# Patient Record
Sex: MALE | Race: WHITE | NOT HISPANIC OR LATINO | Employment: OTHER | URBAN - METROPOLITAN AREA
[De-identification: names, ages, dates, MRNs, and addresses within clinical notes are randomized per-mention and may not be internally consistent; named-entity substitution may affect disease eponyms.]

---

## 2017-08-17 ENCOUNTER — LAB REQUISITION (OUTPATIENT)
Dept: LAB | Facility: HOSPITAL | Age: 57
End: 2017-08-17

## 2017-08-17 DIAGNOSIS — R13.10 DYSPHAGIA: ICD-10-CM

## 2017-08-17 PROCEDURE — 88305 TISSUE EXAM BY PATHOLOGIST: CPT | Performed by: INTERNAL MEDICINE

## 2017-08-18 LAB
CYTO UR: NORMAL
LAB AP CASE REPORT: NORMAL
LAB AP CLINICAL INFORMATION: NORMAL
LAB AP DIAGNOSIS COMMENT: NORMAL
Lab: NORMAL
PATH REPORT.FINAL DX SPEC: NORMAL
PATH REPORT.GROSS SPEC: NORMAL

## 2018-02-17 ENCOUNTER — HOSPITAL ENCOUNTER (EMERGENCY)
Facility: HOSPITAL | Age: 58
Discharge: HOME | End: 2018-02-18
Attending: EMERGENCY MEDICINE | Admitting: EMERGENCY MEDICINE

## 2018-02-17 ENCOUNTER — APPOINTMENT (OUTPATIENT)
Dept: GENERAL RADIOLOGY | Facility: HOSPITAL | Age: 58
End: 2018-02-17

## 2018-02-17 VITALS
OXYGEN SATURATION: 96 % | HEART RATE: 86 BPM | SYSTOLIC BLOOD PRESSURE: 132 MMHG | BODY MASS INDEX: 29.16 KG/M2 | DIASTOLIC BLOOD PRESSURE: 98 MMHG | WEIGHT: 220 LBS | TEMPERATURE: 98 F | HEIGHT: 73 IN | RESPIRATION RATE: 20 BRPM

## 2018-02-17 DIAGNOSIS — F41.9 ANXIETY: ICD-10-CM

## 2018-02-17 DIAGNOSIS — R06.02 SHORTNESS OF BREATH: Primary | ICD-10-CM

## 2018-02-17 DIAGNOSIS — J18.9 PNEUMONIA OF LEFT UPPER LOBE DUE TO INFECTIOUS ORGANISM: ICD-10-CM

## 2018-02-17 LAB
ALBUMIN SERPL-MCNC: 4.7 G/DL (ref 3.2–4.8)
ALBUMIN/GLOB SERPL: 1.5 G/DL (ref 1.5–2.5)
ALP SERPL-CCNC: 84 U/L (ref 25–100)
ALT SERPL W P-5'-P-CCNC: 23 U/L (ref 7–40)
ANION GAP SERPL CALCULATED.3IONS-SCNC: 9 MMOL/L (ref 3–11)
AST SERPL-CCNC: 32 U/L (ref 0–33)
BASOPHILS # BLD AUTO: 0.01 10*3/MM3 (ref 0–0.2)
BASOPHILS NFR BLD AUTO: 0.2 % (ref 0–1)
BILIRUB SERPL-MCNC: 0.4 MG/DL (ref 0.3–1.2)
BILIRUB UR QL STRIP: ABNORMAL
BUN BLD-MCNC: 14 MG/DL (ref 9–23)
BUN/CREAT SERPL: 11.7 (ref 7–25)
CALCIUM SPEC-SCNC: 9.5 MG/DL (ref 8.7–10.4)
CHLORIDE SERPL-SCNC: 102 MMOL/L (ref 99–109)
CLARITY UR: ABNORMAL
CO2 SERPL-SCNC: 24 MMOL/L (ref 20–31)
COLOR UR: ABNORMAL
CREAT BLD-MCNC: 1.2 MG/DL (ref 0.6–1.3)
D DIMER PPP FEU-MCNC: 0.31 MG/L (FEU) (ref 0–0.5)
DEPRECATED RDW RBC AUTO: 42.5 FL (ref 37–54)
EOSINOPHIL # BLD AUTO: 0.01 10*3/MM3 (ref 0–0.3)
EOSINOPHIL NFR BLD AUTO: 0.2 % (ref 0–3)
ERYTHROCYTE [DISTWIDTH] IN BLOOD BY AUTOMATED COUNT: 13.1 % (ref 11.3–14.5)
FLUAV AG NPH QL: NEGATIVE
FLUBV AG NPH QL IA: NEGATIVE
GFR SERPL CREATININE-BSD FRML MDRD: 63 ML/MIN/1.73
GLOBULIN UR ELPH-MCNC: 3.1 GM/DL
GLUCOSE BLD-MCNC: 103 MG/DL (ref 70–100)
GLUCOSE UR STRIP-MCNC: NEGATIVE MG/DL
HCT VFR BLD AUTO: 46.6 % (ref 38.9–50.9)
HGB BLD-MCNC: 16.2 G/DL (ref 13.1–17.5)
HGB UR QL STRIP.AUTO: NEGATIVE
HOLD SPECIMEN: NORMAL
HOLD SPECIMEN: NORMAL
IMM GRANULOCYTES # BLD: 0.01 10*3/MM3 (ref 0–0.03)
IMM GRANULOCYTES NFR BLD: 0.2 % (ref 0–0.6)
KETONES UR QL STRIP: ABNORMAL
LEUKOCYTE ESTERASE UR QL STRIP.AUTO: ABNORMAL
LYMPHOCYTES # BLD AUTO: 1.97 10*3/MM3 (ref 0.6–4.8)
LYMPHOCYTES NFR BLD AUTO: 44.6 % (ref 24–44)
MAGNESIUM SERPL-MCNC: 2 MG/DL (ref 1.3–2.7)
MCH RBC QN AUTO: 30.9 PG (ref 27–31)
MCHC RBC AUTO-ENTMCNC: 34.8 G/DL (ref 32–36)
MCV RBC AUTO: 88.8 FL (ref 80–99)
MONOCYTES # BLD AUTO: 0.49 10*3/MM3 (ref 0–1)
MONOCYTES NFR BLD AUTO: 11.1 % (ref 0–12)
NEUTROPHILS # BLD AUTO: 1.93 10*3/MM3 (ref 1.5–8.3)
NEUTROPHILS NFR BLD AUTO: 43.7 % (ref 41–71)
NITRITE UR QL STRIP: NEGATIVE
PH UR STRIP.AUTO: 5.5 [PH] (ref 5–8)
PLATELET # BLD AUTO: 180 10*3/MM3 (ref 150–450)
PMV BLD AUTO: 10.8 FL (ref 6–12)
POTASSIUM BLD-SCNC: 3.9 MMOL/L (ref 3.5–5.5)
PROT SERPL-MCNC: 7.8 G/DL (ref 5.7–8.2)
PROT UR QL STRIP: NEGATIVE
RBC # BLD AUTO: 5.25 10*6/MM3 (ref 4.2–5.76)
SODIUM BLD-SCNC: 135 MMOL/L (ref 132–146)
SP GR UR STRIP: 1.03 (ref 1–1.03)
TROPONIN I SERPL-MCNC: 0.02 NG/ML (ref 0–0.07)
UROBILINOGEN UR QL STRIP: ABNORMAL
WBC NRBC COR # BLD: 4.42 10*3/MM3 (ref 3.5–10.8)
WHOLE BLOOD HOLD SPECIMEN: NORMAL
WHOLE BLOOD HOLD SPECIMEN: NORMAL

## 2018-02-17 PROCEDURE — 93005 ELECTROCARDIOGRAM TRACING: CPT

## 2018-02-17 PROCEDURE — 99284 EMERGENCY DEPT VISIT MOD MDM: CPT

## 2018-02-17 PROCEDURE — 85025 COMPLETE CBC W/AUTO DIFF WBC: CPT | Performed by: EMERGENCY MEDICINE

## 2018-02-17 PROCEDURE — 81001 URINALYSIS AUTO W/SCOPE: CPT | Performed by: EMERGENCY MEDICINE

## 2018-02-17 PROCEDURE — 80053 COMPREHEN METABOLIC PANEL: CPT | Performed by: EMERGENCY MEDICINE

## 2018-02-17 PROCEDURE — 87804 INFLUENZA ASSAY W/OPTIC: CPT | Performed by: EMERGENCY MEDICINE

## 2018-02-17 PROCEDURE — 83735 ASSAY OF MAGNESIUM: CPT | Performed by: EMERGENCY MEDICINE

## 2018-02-17 PROCEDURE — 85379 FIBRIN DEGRADATION QUANT: CPT | Performed by: EMERGENCY MEDICINE

## 2018-02-17 PROCEDURE — 93005 ELECTROCARDIOGRAM TRACING: CPT | Performed by: EMERGENCY MEDICINE

## 2018-02-17 PROCEDURE — 71045 X-RAY EXAM CHEST 1 VIEW: CPT

## 2018-02-17 PROCEDURE — 84484 ASSAY OF TROPONIN QUANT: CPT

## 2018-02-17 RX ORDER — HYDROXYZINE HYDROCHLORIDE 25 MG/1
25 TABLET, FILM COATED ORAL EVERY 6 HOURS PRN
Qty: 15 TABLET | Refills: 0 | Status: SHIPPED | OUTPATIENT
Start: 2018-02-17 | End: 2021-03-30 | Stop reason: SDUPTHER

## 2018-02-17 RX ORDER — SODIUM CHLORIDE 0.9 % (FLUSH) 0.9 %
10 SYRINGE (ML) INJECTION AS NEEDED
Status: DISCONTINUED | OUTPATIENT
Start: 2018-02-17 | End: 2018-02-18 | Stop reason: HOSPADM

## 2018-02-17 RX ORDER — AZITHROMYCIN 250 MG/1
TABLET, FILM COATED ORAL
Qty: 6 TABLET | Refills: 0 | Status: SHIPPED | OUTPATIENT
Start: 2018-02-17 | End: 2021-03-30

## 2018-02-17 RX ORDER — LORAZEPAM 1 MG/1
1 TABLET ORAL ONCE
Status: COMPLETED | OUTPATIENT
Start: 2018-02-17 | End: 2018-02-17

## 2018-02-17 RX ORDER — OMEPRAZOLE 40 MG/1
40 CAPSULE, DELAYED RELEASE ORAL DAILY
COMMUNITY
End: 2022-02-01 | Stop reason: SDUPTHER

## 2018-02-17 RX ADMIN — LORAZEPAM 1 MG: 1 TABLET ORAL at 22:28

## 2018-02-18 LAB
BACTERIA UR QL AUTO: ABNORMAL /HPF
HYALINE CASTS UR QL AUTO: ABNORMAL /LPF
RBC # UR: ABNORMAL /HPF
REF LAB TEST METHOD: ABNORMAL
SQUAMOUS #/AREA URNS HPF: ABNORMAL /HPF
WBC UR QL AUTO: ABNORMAL /HPF

## 2018-02-18 NOTE — ED PROVIDER NOTES
Subjective   HPI Comments: Pt states that his body was sore last week and SOA developed Monday night.  Body pains have now improved, but SOA is slightly worse.  He has experienced fevers until yesterday.  Admits to cough but denies specific chest pain other than generalized body aches, which are improved relative to Monday night.    Patient is a 57 y.o. male presenting with shortness of breath.   History provided by:  Patient  Shortness of Breath   Severity:  Moderate  Duration:  5 days  Timing:  Constant  Progression:  Worsening  Chronicity:  New  Context: activity    Context: not emotional upset    Relieved by:  Nothing  Worsened by:  Nothing  Ineffective treatments:  None tried  Associated symptoms: cough (cough was worse earlier this week, but has improved over the past couple days.), fever (Fever resolved yesterday.), sore throat, sputum production (beige) and wheezing (occasionally)    Associated symptoms: no abdominal pain, no chest pain, no claudication, no diaphoresis, no ear pain, no headaches, no hemoptysis, no neck pain, no rash, no syncope, no swollen glands and no vomiting    Cough:     Cough characteristics:  Productive      Review of Systems   Constitutional: Positive for fever (Fever resolved yesterday.). Negative for diaphoresis.   HENT: Positive for sore throat. Negative for ear pain.    Respiratory: Positive for cough (cough was worse earlier this week, but has improved over the past couple days.), sputum production (beige), shortness of breath and wheezing (occasionally). Negative for hemoptysis.    Cardiovascular: Negative for chest pain, claudication and syncope.   Gastrointestinal: Negative for abdominal pain and vomiting.   Musculoskeletal: Negative for neck pain.   Skin: Negative for rash.   Neurological: Negative for headaches.   All other systems reviewed and are negative.      Past Medical History:   Diagnosis Date   • Boston esophagus        No Known Allergies    Past Surgical History:    Procedure Laterality Date   • ENDOSCOPY         No family history on file.    Social History     Social History   • Marital status:      Spouse name: N/A   • Number of children: N/A   • Years of education: N/A     Social History Main Topics   • Smoking status: Never Smoker   • Smokeless tobacco: Not on file   • Alcohol use No   • Drug use: No   • Sexual activity: Not on file     Other Topics Concern   • Not on file     Social History Narrative   • No narrative on file           Objective   Physical Exam   Constitutional: He is oriented to person, place, and time. He appears well-developed and well-nourished. No distress.   HENT:   Head: Normocephalic and atraumatic.   Eyes: Conjunctivae and EOM are normal. Pupils are equal, round, and reactive to light.   Neck: Normal range of motion. Neck supple. No thyromegaly present.   Cardiovascular: Normal rate, regular rhythm and normal heart sounds.  Exam reveals no gallop and no friction rub.    No murmur heard.  Pulmonary/Chest: Effort normal and breath sounds normal. No respiratory distress. He has no wheezes.   Abdominal: Soft. Bowel sounds are normal. There is no tenderness.   Musculoskeletal: Normal range of motion.   Lymphadenopathy:     He has no cervical adenopathy.   Neurological: He is alert and oriented to person, place, and time.   Skin: Skin is warm and dry.   Psychiatric: He has a normal mood and affect.   Nursing note and vitals reviewed.      Procedures         ED Course  ED Course      Recent Results (from the past 24 hour(s))   Comprehensive Metabolic Panel    Collection Time: 02/17/18  7:36 PM   Result Value Ref Range    Glucose 103 (H) 70 - 100 mg/dL    BUN 14 9 - 23 mg/dL    Creatinine 1.20 0.60 - 1.30 mg/dL    Sodium 135 132 - 146 mmol/L    Potassium 3.9 3.5 - 5.5 mmol/L    Chloride 102 99 - 109 mmol/L    CO2 24.0 20.0 - 31.0 mmol/L    Calcium 9.5 8.7 - 10.4 mg/dL    Total Protein 7.8 5.7 - 8.2 g/dL    Albumin 4.70 3.20 - 4.80 g/dL    ALT  (SGPT) 23 7 - 40 U/L    AST (SGOT) 32 0 - 33 U/L    Alkaline Phosphatase 84 25 - 100 U/L    Total Bilirubin 0.4 0.3 - 1.2 mg/dL    eGFR Non African Amer 63 >60 mL/min/1.73    Globulin 3.1 gm/dL    A/G Ratio 1.5 1.5 - 2.5 g/dL    BUN/Creatinine Ratio 11.7 7.0 - 25.0    Anion Gap 9.0 3.0 - 11.0 mmol/L   Magnesium    Collection Time: 02/17/18  7:36 PM   Result Value Ref Range    Magnesium 2.0 1.3 - 2.7 mg/dL   Green Top (Gel)    Collection Time: 02/17/18  7:36 PM   Result Value Ref Range    Extra Tube Hold for add-ons.    Lavender Top    Collection Time: 02/17/18  7:36 PM   Result Value Ref Range    Extra Tube hold for add-on    CBC Auto Differential    Collection Time: 02/17/18  7:36 PM   Result Value Ref Range    WBC 4.42 3.50 - 10.80 10*3/mm3    RBC 5.25 4.20 - 5.76 10*6/mm3    Hemoglobin 16.2 13.1 - 17.5 g/dL    Hematocrit 46.6 38.9 - 50.9 %    MCV 88.8 80.0 - 99.0 fL    MCH 30.9 27.0 - 31.0 pg    MCHC 34.8 32.0 - 36.0 g/dL    RDW 13.1 11.3 - 14.5 %    RDW-SD 42.5 37.0 - 54.0 fl    MPV 10.8 6.0 - 12.0 fL    Platelets 180 150 - 450 10*3/mm3    Neutrophil % 43.7 41.0 - 71.0 %    Lymphocyte % 44.6 (H) 24.0 - 44.0 %    Monocyte % 11.1 0.0 - 12.0 %    Eosinophil % 0.2 0.0 - 3.0 %    Basophil % 0.2 0.0 - 1.0 %    Immature Grans % 0.2 0.0 - 0.6 %    Neutrophils, Absolute 1.93 1.50 - 8.30 10*3/mm3    Lymphocytes, Absolute 1.97 0.60 - 4.80 10*3/mm3    Monocytes, Absolute 0.49 0.00 - 1.00 10*3/mm3    Eosinophils, Absolute 0.01 0.00 - 0.30 10*3/mm3    Basophils, Absolute 0.01 0.00 - 0.20 10*3/mm3    Immature Grans, Absolute 0.01 0.00 - 0.03 10*3/mm3   Light Blue Top    Collection Time: 02/17/18  7:37 PM   Result Value Ref Range    Extra Tube hold for add-on    Gold Top - SST    Collection Time: 02/17/18  7:37 PM   Result Value Ref Range    Extra Tube Hold for add-ons.    D-dimer, Quantitative    Collection Time: 02/17/18  7:37 PM   Result Value Ref Range    D-Dimer, Quantitative 0.31 0.00 - 0.50 mg/L (FEU)   POC Troponin,  Rapid    Collection Time: 02/17/18  7:41 PM   Result Value Ref Range    Troponin I 0.02 0.00 - 0.07 ng/mL   Influenza Antigen, Rapid - Swab, Nasopharynx    Collection Time: 02/17/18  9:40 PM   Result Value Ref Range    Influenza A Ag, EIA Negative Negative    Influenza B Ag, EIA Negative Negative   Urinalysis With / Culture If Indicated - Urine, Clean Catch    Collection Time: 02/17/18 11:14 PM   Result Value Ref Range    Color, UA Dark Yellow (A) Yellow, Straw    Appearance, UA Turbid (A) Clear    pH, UA 5.5 5.0 - 8.0    Specific Gravity, UA 1.026 1.001 - 1.030    Glucose, UA Negative Negative    Ketones, UA 80 mg/dL (3+) (A) Negative    Bilirubin, UA Small (1+) (A) Negative    Blood, UA Negative Negative    Protein, UA Negative Negative    Leuk Esterase, UA Trace (A) Negative    Nitrite, UA Negative Negative    Urobilinogen, UA 1.0 E.U./dL 0.2 - 1.0 E.U./dL   Urinalysis, Microscopic Only - Urine, Clean Catch    Collection Time: 02/17/18 11:14 PM   Result Value Ref Range    RBC, UA 3-6 (A) None Seen, 0-2 /HPF    WBC, UA 3-5 (A) None Seen, 0-2 /HPF    Bacteria, UA None Seen None Seen, Trace /HPF    Squamous Epithelial Cells, UA 3-6 (A) None Seen, 0-2 /HPF    Hyaline Casts, UA None Seen 0 - 6 /LPF    Methodology Manual Light Microscopy      Note: In addition to lab results from this visit, the labs listed above may include labs taken at another facility or during a different encounter within the last 24 hours. Please correlate lab times with ED admission and discharge times for further clarification of the services performed during this visit.    XR Chest 1 View   Final Result     Small focal consolidation in the lingula, infiltrate versus scar.      THIS DOCUMENT HAS BEEN ELECTRONICALLY SIGNED BY MARY MARCH MD        Vitals:    02/17/18 2154 02/17/18 2155 02/17/18 2215 02/17/18 2248   BP: (!) 140/108 145/100 132/98    BP Location:       Patient Position: Sitting Standing     Pulse: 77 77 78 86   Resp:        Temp:       TempSrc:       SpO2:   98% 96%   Weight:       Height:         Medications   LORazepam (ATIVAN) tablet 1 mg (1 mg Oral Given 2/17/18 2228)     ECG/EMG Results (last 24 hours)     Procedure Component Value Units Date/Time    ECG 12 Lead [[id number]] Collected:  02/17/18 1936     Updated:  02/17/18 1937        Although exact etiology is unclear and anxiety is likely a component of his symptoms, given his complaint of SOA and possible PNA on CXR, I will write for azithromycin.            MDM    Final diagnoses:   Shortness of breath   Pneumonia of left upper lobe due to infectious organism   Anxiety            Leonardo Elaine, DO  02/18/18 1015

## 2020-06-18 ENCOUNTER — TELEPHONE (OUTPATIENT)
Dept: GASTROENTEROLOGY | Facility: CLINIC | Age: 60
End: 2020-06-18

## 2020-06-19 ENCOUNTER — TELEPHONE (OUTPATIENT)
Dept: GASTROENTEROLOGY | Facility: CLINIC | Age: 60
End: 2020-06-19

## 2020-06-21 ENCOUNTER — APPOINTMENT (OUTPATIENT)
Dept: PREADMISSION TESTING | Facility: HOSPITAL | Age: 60
End: 2020-06-21

## 2020-06-26 ENCOUNTER — APPOINTMENT (OUTPATIENT)
Dept: PREADMISSION TESTING | Facility: HOSPITAL | Age: 60
End: 2020-06-26

## 2020-06-26 PROCEDURE — C9803 HOPD COVID-19 SPEC COLLECT: HCPCS

## 2020-06-26 PROCEDURE — U0004 COV-19 TEST NON-CDC HGH THRU: HCPCS

## 2020-06-26 PROCEDURE — U0002 COVID-19 LAB TEST NON-CDC: HCPCS

## 2020-06-27 LAB
REF LAB TEST METHOD: NORMAL
SARS-COV-2 RNA RESP QL NAA+PROBE: NOT DETECTED

## 2020-06-30 ENCOUNTER — OUTSIDE FACILITY SERVICE (OUTPATIENT)
Dept: GASTROENTEROLOGY | Facility: CLINIC | Age: 60
End: 2020-06-30

## 2020-06-30 ENCOUNTER — LAB REQUISITION (OUTPATIENT)
Dept: LAB | Facility: HOSPITAL | Age: 60
End: 2020-06-30

## 2020-06-30 DIAGNOSIS — K22.70 BARRETT'S ESOPHAGUS WITHOUT DYSPLASIA: ICD-10-CM

## 2020-06-30 PROCEDURE — 43239 EGD BIOPSY SINGLE/MULTIPLE: CPT | Performed by: INTERNAL MEDICINE

## 2020-06-30 PROCEDURE — 88305 TISSUE EXAM BY PATHOLOGIST: CPT | Performed by: INTERNAL MEDICINE

## 2020-06-30 PROCEDURE — 43249 ESOPH EGD DILATION <30 MM: CPT | Performed by: INTERNAL MEDICINE

## 2020-07-02 LAB
CYTO UR: NORMAL
LAB AP CASE REPORT: NORMAL
LAB AP CLINICAL INFORMATION: NORMAL
LAB AP DIAGNOSIS COMMENT: NORMAL
PATH REPORT.ADDENDUM SPEC: NORMAL
PATH REPORT.FINAL DX SPEC: NORMAL
PATH REPORT.GROSS SPEC: NORMAL

## 2021-03-30 ENCOUNTER — OFFICE VISIT (OUTPATIENT)
Dept: FAMILY MEDICINE CLINIC | Facility: CLINIC | Age: 61
End: 2021-03-30

## 2021-03-30 VITALS
OXYGEN SATURATION: 98 % | HEIGHT: 73 IN | BODY MASS INDEX: 29.03 KG/M2 | DIASTOLIC BLOOD PRESSURE: 82 MMHG | HEART RATE: 70 BPM | WEIGHT: 219 LBS | SYSTOLIC BLOOD PRESSURE: 136 MMHG

## 2021-03-30 DIAGNOSIS — G56.91 NEUROPATHY OF RIGHT HAND: ICD-10-CM

## 2021-03-30 DIAGNOSIS — M25.511 CHRONIC PAIN OF BOTH SHOULDERS: ICD-10-CM

## 2021-03-30 DIAGNOSIS — G89.29 CHRONIC PAIN OF BOTH SHOULDERS: ICD-10-CM

## 2021-03-30 DIAGNOSIS — Z87.898 HISTORY OF FEVER: ICD-10-CM

## 2021-03-30 DIAGNOSIS — F41.1 GENERALIZED ANXIETY DISORDER: ICD-10-CM

## 2021-03-30 DIAGNOSIS — R07.9 CHEST PAIN, UNSPECIFIED TYPE: ICD-10-CM

## 2021-03-30 DIAGNOSIS — Z00.00 PREVENTATIVE HEALTH CARE: ICD-10-CM

## 2021-03-30 DIAGNOSIS — K22.70 BARRETT'S ESOPHAGUS WITHOUT DYSPLASIA: Primary | ICD-10-CM

## 2021-03-30 DIAGNOSIS — Z12.5 ENCOUNTER FOR PROSTATE CANCER SCREENING: ICD-10-CM

## 2021-03-30 DIAGNOSIS — E55.9 VITAMIN D DEFICIENCY: ICD-10-CM

## 2021-03-30 DIAGNOSIS — M25.512 CHRONIC PAIN OF BOTH SHOULDERS: ICD-10-CM

## 2021-03-30 PROCEDURE — 99204 OFFICE O/P NEW MOD 45 MIN: CPT | Performed by: INTERNAL MEDICINE

## 2021-03-30 RX ORDER — HYDROXYZINE HYDROCHLORIDE 25 MG/1
50 TABLET, FILM COATED ORAL EVERY 6 HOURS PRN
Qty: 30 TABLET | Refills: 0 | Status: SHIPPED | OUTPATIENT
Start: 2021-03-30 | End: 2021-03-30 | Stop reason: SDUPTHER

## 2021-03-30 RX ORDER — HYDROXYZINE HYDROCHLORIDE 25 MG/1
50 TABLET, FILM COATED ORAL EVERY 6 HOURS PRN
Qty: 30 TABLET | Refills: 0 | Status: SHIPPED | OUTPATIENT
Start: 2021-03-30 | End: 2021-04-22

## 2021-03-30 RX ORDER — ESCITALOPRAM OXALATE 5 MG/1
5 TABLET ORAL DAILY
Qty: 30 TABLET | Refills: 1 | Status: SHIPPED | OUTPATIENT
Start: 2021-03-30 | End: 2021-04-12
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
## 2021-04-01 ENCOUNTER — LAB (OUTPATIENT)
Dept: LAB | Facility: HOSPITAL | Age: 61
End: 2021-04-01

## 2021-04-01 ENCOUNTER — OFFICE VISIT (OUTPATIENT)
Dept: ORTHOPEDIC SURGERY | Facility: CLINIC | Age: 61
End: 2021-04-01

## 2021-04-01 VITALS
BODY MASS INDEX: 29.03 KG/M2 | HEIGHT: 73 IN | SYSTOLIC BLOOD PRESSURE: 137 MMHG | HEART RATE: 69 BPM | DIASTOLIC BLOOD PRESSURE: 92 MMHG | WEIGHT: 219 LBS

## 2021-04-01 DIAGNOSIS — M25.511 BILATERAL SHOULDER PAIN, UNSPECIFIED CHRONICITY: ICD-10-CM

## 2021-04-01 DIAGNOSIS — G89.29 CHRONIC PAIN OF BOTH SHOULDERS: ICD-10-CM

## 2021-04-01 DIAGNOSIS — M25.511 CHRONIC PAIN OF BOTH SHOULDERS: ICD-10-CM

## 2021-04-01 DIAGNOSIS — Z12.5 ENCOUNTER FOR PROSTATE CANCER SCREENING: ICD-10-CM

## 2021-04-01 DIAGNOSIS — Z87.898 HISTORY OF FEVER: ICD-10-CM

## 2021-04-01 DIAGNOSIS — M25.512 BILATERAL SHOULDER PAIN, UNSPECIFIED CHRONICITY: ICD-10-CM

## 2021-04-01 DIAGNOSIS — E55.9 VITAMIN D DEFICIENCY: ICD-10-CM

## 2021-04-01 DIAGNOSIS — G56.91 NEUROPATHY OF RIGHT HAND: ICD-10-CM

## 2021-04-01 DIAGNOSIS — M25.512 CHRONIC PAIN OF BOTH SHOULDERS: ICD-10-CM

## 2021-04-01 DIAGNOSIS — M75.42 IMPINGEMENT SYNDROME OF LEFT SHOULDER: ICD-10-CM

## 2021-04-01 DIAGNOSIS — M75.52 BURSITIS OF BOTH SHOULDERS: ICD-10-CM

## 2021-04-01 DIAGNOSIS — M75.51 BURSITIS OF BOTH SHOULDERS: ICD-10-CM

## 2021-04-01 DIAGNOSIS — Z00.00 PREVENTATIVE HEALTH CARE: ICD-10-CM

## 2021-04-01 DIAGNOSIS — R07.9 CHEST PAIN, UNSPECIFIED TYPE: ICD-10-CM

## 2021-04-01 DIAGNOSIS — S46.002A ROTATOR CUFF INJURY, LEFT, INITIAL ENCOUNTER: Primary | ICD-10-CM

## 2021-04-01 LAB
25(OH)D3 SERPL-MCNC: 16.2 NG/ML
ALBUMIN SERPL-MCNC: 4.5 G/DL (ref 3.5–5.2)
ALBUMIN/GLOB SERPL: 2 G/DL
ALP SERPL-CCNC: 79 U/L (ref 39–117)
ALT SERPL W P-5'-P-CCNC: 16 U/L (ref 1–41)
ANION GAP SERPL CALCULATED.3IONS-SCNC: 9.7 MMOL/L (ref 5–15)
AST SERPL-CCNC: 18 U/L (ref 1–40)
BASOPHILS # BLD AUTO: 0.02 10*3/MM3 (ref 0–0.2)
BASOPHILS NFR BLD AUTO: 0.3 % (ref 0–1.5)
BILIRUB SERPL-MCNC: 0.5 MG/DL (ref 0–1.2)
BUN SERPL-MCNC: 15 MG/DL (ref 6–20)
BUN/CREAT SERPL: 17 (ref 7–25)
CALCIUM SPEC-SCNC: 9.3 MG/DL (ref 8.6–10.5)
CHLORIDE SERPL-SCNC: 105 MMOL/L (ref 98–107)
CHOLEST SERPL-MCNC: 171 MG/DL (ref 0–200)
CO2 SERPL-SCNC: 25.3 MMOL/L (ref 22–29)
CREAT SERPL-MCNC: 0.88 MG/DL (ref 0.76–1.27)
DEPRECATED RDW RBC AUTO: 39.6 FL (ref 37–54)
EOSINOPHIL # BLD AUTO: 0.04 10*3/MM3 (ref 0–0.4)
EOSINOPHIL NFR BLD AUTO: 0.6 % (ref 0.3–6.2)
ERYTHROCYTE [DISTWIDTH] IN BLOOD BY AUTOMATED COUNT: 12.6 % (ref 12.3–15.4)
GFR SERPL CREATININE-BSD FRML MDRD: 89 ML/MIN/1.73
GLOBULIN UR ELPH-MCNC: 2.2 GM/DL
GLUCOSE SERPL-MCNC: 80 MG/DL (ref 65–99)
HCT VFR BLD AUTO: 44.6 % (ref 37.5–51)
HDLC SERPL-MCNC: 54 MG/DL (ref 40–60)
HGB BLD-MCNC: 15.6 G/DL (ref 13–17.7)
IMM GRANULOCYTES # BLD AUTO: 0.02 10*3/MM3 (ref 0–0.05)
IMM GRANULOCYTES NFR BLD AUTO: 0.3 % (ref 0–0.5)
LDLC SERPL CALC-MCNC: 104 MG/DL (ref 0–100)
LDLC/HDLC SERPL: 1.91 {RATIO}
LYMPHOCYTES # BLD AUTO: 1.77 10*3/MM3 (ref 0.7–3.1)
LYMPHOCYTES NFR BLD AUTO: 24.5 % (ref 19.6–45.3)
MCH RBC QN AUTO: 30.4 PG (ref 26.6–33)
MCHC RBC AUTO-ENTMCNC: 35 G/DL (ref 31.5–35.7)
MCV RBC AUTO: 86.8 FL (ref 79–97)
MONOCYTES # BLD AUTO: 0.39 10*3/MM3 (ref 0.1–0.9)
MONOCYTES NFR BLD AUTO: 5.4 % (ref 5–12)
NEUTROPHILS NFR BLD AUTO: 4.98 10*3/MM3 (ref 1.7–7)
NEUTROPHILS NFR BLD AUTO: 68.9 % (ref 42.7–76)
NRBC BLD AUTO-RTO: 0 /100 WBC (ref 0–0.2)
PLATELET # BLD AUTO: 240 10*3/MM3 (ref 140–450)
PMV BLD AUTO: 10.1 FL (ref 6–12)
POTASSIUM SERPL-SCNC: 4.5 MMOL/L (ref 3.5–5.2)
PROT SERPL-MCNC: 6.7 G/DL (ref 6–8.5)
PSA SERPL-MCNC: 2.65 NG/ML (ref 0–4)
RBC # BLD AUTO: 5.14 10*6/MM3 (ref 4.14–5.8)
SODIUM SERPL-SCNC: 140 MMOL/L (ref 136–145)
T4 FREE SERPL-MCNC: 1 NG/DL (ref 0.93–1.7)
TRIGL SERPL-MCNC: 69 MG/DL (ref 0–150)
TSH SERPL DL<=0.05 MIU/L-ACNC: 1.16 UIU/ML (ref 0.27–4.2)
VLDLC SERPL-MCNC: 13 MG/DL (ref 5–40)
WBC # BLD AUTO: 7.22 10*3/MM3 (ref 3.4–10.8)

## 2021-04-01 PROCEDURE — 85025 COMPLETE CBC W/AUTO DIFF WBC: CPT

## 2021-04-01 PROCEDURE — G0103 PSA SCREENING: HCPCS

## 2021-04-01 PROCEDURE — 36415 COLL VENOUS BLD VENIPUNCTURE: CPT

## 2021-04-01 PROCEDURE — 82306 VITAMIN D 25 HYDROXY: CPT

## 2021-04-01 PROCEDURE — 84439 ASSAY OF FREE THYROXINE: CPT

## 2021-04-01 PROCEDURE — 83036 HEMOGLOBIN GLYCOSYLATED A1C: CPT

## 2021-04-01 PROCEDURE — 84443 ASSAY THYROID STIM HORMONE: CPT

## 2021-04-01 PROCEDURE — 80053 COMPREHEN METABOLIC PANEL: CPT

## 2021-04-01 PROCEDURE — 86769 SARS-COV-2 COVID-19 ANTIBODY: CPT

## 2021-04-01 PROCEDURE — 99204 OFFICE O/P NEW MOD 45 MIN: CPT | Performed by: ORTHOPAEDIC SURGERY

## 2021-04-01 PROCEDURE — 80061 LIPID PANEL: CPT
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
## 2021-04-02 LAB
HBA1C MFR BLD: 5.69 % (ref 4.8–5.6)
SARS-COV-2 AB SERPL QL IA: NEGATIVE

## 2021-04-08 ENCOUNTER — OFFICE VISIT (OUTPATIENT)
Dept: ORTHOPEDIC SURGERY | Facility: CLINIC | Age: 61
End: 2021-04-08

## 2021-04-08 VITALS
HEIGHT: 73 IN | SYSTOLIC BLOOD PRESSURE: 147 MMHG | WEIGHT: 218.92 LBS | HEART RATE: 68 BPM | BODY MASS INDEX: 29.01 KG/M2 | DIASTOLIC BLOOD PRESSURE: 90 MMHG

## 2021-04-08 DIAGNOSIS — M79.641 RIGHT HAND PAIN: Primary | ICD-10-CM

## 2021-04-08 DIAGNOSIS — R20.0 NUMBNESS AND TINGLING IN RIGHT HAND: ICD-10-CM

## 2021-04-08 DIAGNOSIS — R20.2 NUMBNESS AND TINGLING IN RIGHT HAND: ICD-10-CM

## 2021-04-08 PROCEDURE — 99213 OFFICE O/P EST LOW 20 MIN: CPT | Performed by: PHYSICIAN ASSISTANT
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
## 2021-04-12 ENCOUNTER — OFFICE VISIT (OUTPATIENT)
Dept: CARDIOLOGY | Facility: HOSPITAL | Age: 61
End: 2021-04-12

## 2021-04-12 ENCOUNTER — HOSPITAL ENCOUNTER (OUTPATIENT)
Dept: CARDIOLOGY | Facility: HOSPITAL | Age: 61
Discharge: HOME | End: 2021-04-12

## 2021-04-12 ENCOUNTER — TELEPHONE (OUTPATIENT)
Dept: CARDIOLOGY | Facility: HOSPITAL | Age: 61
End: 2021-04-12

## 2021-04-12 VITALS
WEIGHT: 218.25 LBS | BODY MASS INDEX: 28.93 KG/M2 | OXYGEN SATURATION: 98 % | DIASTOLIC BLOOD PRESSURE: 79 MMHG | SYSTOLIC BLOOD PRESSURE: 133 MMHG | HEART RATE: 65 BPM | HEIGHT: 73 IN | TEMPERATURE: 97.1 F | RESPIRATION RATE: 20 BRPM

## 2021-04-12 DIAGNOSIS — I10 ESSENTIAL HYPERTENSION: ICD-10-CM

## 2021-04-12 DIAGNOSIS — R06.09 DOE (DYSPNEA ON EXERTION): ICD-10-CM

## 2021-04-12 DIAGNOSIS — R07.9 CHEST PAIN, UNSPECIFIED TYPE: Primary | ICD-10-CM

## 2021-04-12 DIAGNOSIS — R07.9 CHEST PAIN, UNSPECIFIED TYPE: ICD-10-CM

## 2021-04-12 DIAGNOSIS — R00.2 PALPITATIONS: ICD-10-CM

## 2021-04-12 LAB
QT INTERVAL: 380 MS
QTC INTERVAL: 404 MS

## 2021-04-12 PROCEDURE — 93010 ELECTROCARDIOGRAM REPORT: CPT | Performed by: INTERNAL MEDICINE

## 2021-04-12 PROCEDURE — 93005 ELECTROCARDIOGRAM TRACING: CPT | Performed by: NURSE PRACTITIONER

## 2021-04-12 PROCEDURE — 99204 OFFICE O/P NEW MOD 45 MIN: CPT | Performed by: NURSE PRACTITIONER

## 2021-04-12 RX ORDER — AMLODIPINE BESYLATE 5 MG/1
5 TABLET ORAL DAILY
Qty: 30 TABLET | Refills: 3 | Status: SHIPPED | OUTPATIENT
Start: 2021-04-12 | End: 2021-08-26

## 2021-04-12 RX ORDER — FAMOTIDINE 20 MG
5000 TABLET ORAL DAILY
Status: ON HOLD | COMMUNITY
End: 2021-08-21

## 2021-04-12 NOTE — PROGRESS NOTES
"Chief Complaint  Chest Pain and Establish Care    Subjective    History of Present Illness {CC  Problem List  Visit  Diagnosis   Encounters  Notes  Medications  Labs  Result Review Imaging  Media :23}     Félix Brian presents to [facility]OLOG[facility] for   History of Present Illness   60-year-old male with anxiety, Boston's esophagus who presents today for evaluation of chest pain at the request of Dr. Bolaños.  Patient reports chronic aching chest discomfort with physical activity which has been going ongoing for the last several months to years. He reports chronic left sided chest pressure since he was a child.   He reports over the past few months he has had some worsening left chest pressure. Chest pressure worse when bending over but not with exercise. He thinks it may be related to his Boston's.  He does report exertional dyspnea for years, but not worsening . He reports over the past year he has had intermittent palpitations, no palpitations over the past month. He reports episodes of severe headaches. Reports hx of HTN and was hospitalized for this. Previously was on lisinopril but stopped due to lack of insurance.  He says it was a little strong for him    He had a heart cath at [facility] 4-5 years ago that was reportedly normal    Cardiac risks: Age, gender, probable hypertension  Objective     Vital Signs:   Vitals:    04/12/21 1343 04/12/21 1344 04/12/21 1345   BP: 152/89 157/88 133/79   BP Location: Right arm Left arm Left arm   Patient Position: Sitting Standing Sitting   Cuff Size: Adult Adult Adult   Pulse: 80 79 65   Resp:   20   Temp:  97.1 °F (36.2 °C) 97.1 °F (36.2 °C)   TempSrc:  Temporal Temporal   SpO2: 97% 97% 98%   Weight:   99 kg (218 lb 4 oz)   Height:   185.4 cm (73\")     Body mass index is 28.79 kg/m².  Physical Exam  Vitals reviewed.   Constitutional:       Appearance: Normal appearance.   HENT:      Head: Normocephalic.   Eyes:      Extraocular Movements: " Extraocular movements intact.      Pupils: Pupils are equal, round, and reactive to light.   Neck:      Vascular: No carotid bruit.   Cardiovascular:      Rate and Rhythm: Normal rate and regular rhythm.      Pulses: Normal pulses.      Heart sounds: Normal heart sounds, S1 normal and S2 normal. No murmur heard.     Pulmonary:      Effort: Pulmonary effort is normal. No respiratory distress.      Breath sounds: Normal breath sounds.   Chest:      Chest wall: No tenderness.   Abdominal:      General: Abdomen is flat. Bowel sounds are normal.      Palpations: Abdomen is soft.   Musculoskeletal:      Cervical back: Neck supple.      Right lower leg: No edema.      Left lower leg: No edema.   Skin:     General: Skin is warm and dry.   Neurological:      General: No focal deficit present.      Mental Status: He is alert and oriented to person, place, and time. Mental status is at baseline.      Coordination: Abnormal coordination:    Psychiatric:         Mood and Affect: Mood normal.         Behavior: Behavior normal.         Thought Content: Thought content normal.              Result Review  Data Reviewed:{ Labs  Result Review  Imaging  Med Tab  Media :23}   SARS-CoV-2 Antibodies (Roche) (04/01/2021 12:19)  Lipid Panel (04/01/2021 12:19)  Vitamin D 25 Hydroxy (04/01/2021 12:19)  TSH+Free T4 (04/01/2021 12:19)  PSA Screen (04/01/2021 12:19)  Hemoglobin A1c (04/01/2021 12:19)  Comprehensive Metabolic Panel (04/01/2021 12:19)  CBC & Differential (04/01/2021 12:19)    Consultant notes Dr. Bolaños       EKG today shows normal sinus rhythm     Assessment and Plan {CC Problem List  Visit Diagnosis  ROS  Review (Popup)  Health Maintenance  Quality  BestPractice  Medications  SmartSets  SnapShot Encounters  Media :23}   1. Chest pain, unspecified type  Symptoms are slightly atypical, but due to worsening symptoms we will proceed with treadmill stress test.  We will also try and get his left heart cath.  - ECG 12  Lead; Future  - Treadmill Stress Test; Future  - Adult Transthoracic Echo Complete W/ Cont if Necessary Per Protocol; Future    2. INIGUEZ (dyspnea on exertion)  - Treadmill Stress Test; Future  - Adult Transthoracic Echo Complete W/ Cont if Necessary Per Protocol; Future    3. Palpitations  No palpitations in over a month.  Advised him to call with any recurrence of symptoms and will put in a heart monitor.  At this point, I do not think a heart monitor would find anything since he is asymptomatic.  Palpitations could also be worsened from elevated blood pressures  - Treadmill Stress Test; Future  - Adult Transthoracic Echo Complete W/ Cont if Necessary Per Protocol; Future    4. Essential hypertension  I reviewed his previous blood pressures and his blood pressures seem to be on the elevated side.  He was diagnosed with hypertension years ago and he stopped his lisinopril.  He says it was a little strong for him and therefore I will try him on a low-dose of amlodipine.  I advised him to start monitoring his blood pressure daily at home and call if systolic with pressures are consistent greater than 130 or if he has any dizziness and lightheadedness after starting the amlodipine  - Treadmill Stress Test; Future  - Adult Transthoracic Echo Complete W/ Cont if Necessary Per Protocol; Future        Follow Up {Instructions Charge Capture  Follow-up Communications :23}   Return in about 4 weeks (around 5/10/2021) for HTN, Office follow up.    Patient was given instructions and counseling regarding his condition or for health maintenance advice. Please see specific information pulled into the AVS if appropriate.  Patient was instructed to call the [facility] with any questions, concerns, or worsening symptoms.    *Please note that portions of this note were completed with a voice recognition program. Efforts were made to edit the dictations, but occasionally words are mistranscribed.

## 2021-04-12 NOTE — TELEPHONE ENCOUNTER
----- Message from Cleo Galo CMA sent at 4/12/2021  2:35 PM EDT -----  Requested  ----- Message -----  From: Carolin Corey APRN  Sent: 4/12/2021   2:28 PM EDT  To: Cleo Galo CMA    Can you see if you can get [facility] results from [facility]?    Thank you,  Carolin

## 2021-04-21 DIAGNOSIS — F41.1 GENERALIZED ANXIETY DISORDER: ICD-10-CM

## 2021-04-22 RX ORDER — HYDROXYZINE HYDROCHLORIDE 25 MG/1
TABLET, FILM COATED ORAL
Qty: 30 TABLET | Refills: 0 | Status: SHIPPED | OUTPATIENT
Start: 2021-04-22 | End: 2021-05-07

## 2021-04-23 ENCOUNTER — APPOINTMENT (OUTPATIENT)
Dept: PREADMISSION TESTING | Facility: HOSPITAL | Age: 61
End: 2021-04-23

## 2021-04-23 PROCEDURE — C9803 HOPD COVID-19 SPEC COLLECT: HCPCS

## 2021-04-23 PROCEDURE — U0004 COV-19 TEST NON-CDC HGH THRU: HCPCS

## 2021-04-24 LAB — SARS-COV-2 RNA NOSE QL NAA+PROBE: NOT DETECTED

## 2021-04-26 ENCOUNTER — HOSPITAL ENCOUNTER (OUTPATIENT)
Dept: CARDIOLOGY | Facility: HOSPITAL | Age: 61
Discharge: HOME | End: 2021-04-26

## 2021-04-26 VITALS
SYSTOLIC BLOOD PRESSURE: 120 MMHG | HEART RATE: 68 BPM | BODY MASS INDEX: 28.89 KG/M2 | WEIGHT: 218 LBS | DIASTOLIC BLOOD PRESSURE: 82 MMHG | HEIGHT: 73 IN

## 2021-04-26 VITALS — BODY MASS INDEX: 28.89 KG/M2 | WEIGHT: 218 LBS | HEIGHT: 73 IN

## 2021-04-26 DIAGNOSIS — R07.9 CHEST PAIN, UNSPECIFIED TYPE: ICD-10-CM

## 2021-04-26 DIAGNOSIS — I10 ESSENTIAL HYPERTENSION: ICD-10-CM

## 2021-04-26 DIAGNOSIS — R00.2 PALPITATIONS: ICD-10-CM

## 2021-04-26 DIAGNOSIS — R06.09 DOE (DYSPNEA ON EXERTION): ICD-10-CM

## 2021-04-26 LAB
ASCENDING AORTA: 3.7 CM
BH CV ECHO MEAS - AO MAX PG (FULL): 1.1 MMHG
BH CV ECHO MEAS - AO MAX PG: 5 MMHG
BH CV ECHO MEAS - AO MEAN PG (FULL): 1 MMHG
BH CV ECHO MEAS - AO MEAN PG: 3 MMHG
BH CV ECHO MEAS - AO ROOT AREA (BSA CORRECTED): 1.7
BH CV ECHO MEAS - AO ROOT AREA: 10.8 CM^2
BH CV ECHO MEAS - AO ROOT DIAM: 3.7 CM
BH CV ECHO MEAS - AO V2 MAX: 113 CM/SEC
BH CV ECHO MEAS - AO V2 MEAN: 74.9 CM/SEC
BH CV ECHO MEAS - AO V2 VTI: 22.8 CM
BH CV ECHO MEAS - ASC AORTA: 3.8 CM
BH CV ECHO MEAS - AVA(I,A): 4.2 CM^2
BH CV ECHO MEAS - AVA(I,D): 4.2 CM^2
BH CV ECHO MEAS - AVA(V,A): 3.6 CM^2
BH CV ECHO MEAS - AVA(V,D): 3.6 CM^2
BH CV ECHO MEAS - BSA(HAYCOCK): 2.3 M^2
BH CV ECHO MEAS - BSA: 2.2 M^2
BH CV ECHO MEAS - BZI_BMI: 28.8 KILOGRAMS/M^2
BH CV ECHO MEAS - BZI_METRIC_HEIGHT: 185.4 CM
BH CV ECHO MEAS - BZI_METRIC_WEIGHT: 98.9 KG
BH CV ECHO MEAS - EDV(CUBED): 62.3 ML
BH CV ECHO MEAS - EDV(MOD-SP2): 56 ML
BH CV ECHO MEAS - EDV(MOD-SP4): 74 ML
BH CV ECHO MEAS - EDV(TEICH): 68.6 ML
BH CV ECHO MEAS - EF(CUBED): 60.1 %
BH CV ECHO MEAS - EF(MOD-BP): 63 %
BH CV ECHO MEAS - EF(MOD-SP2): 62.5 %
BH CV ECHO MEAS - EF(MOD-SP4): 59.5 %
BH CV ECHO MEAS - EF(TEICH): 52.2 %
BH CV ECHO MEAS - ESV(CUBED): 24.9 ML
BH CV ECHO MEAS - ESV(MOD-SP2): 21 ML
BH CV ECHO MEAS - ESV(MOD-SP4): 30 ML
BH CV ECHO MEAS - ESV(TEICH): 32.8 ML
BH CV ECHO MEAS - FS: 26.4 %
BH CV ECHO MEAS - IVS/LVPW: 1
BH CV ECHO MEAS - IVSD: 1.2 CM
BH CV ECHO MEAS - LA DIMENSION: 3.7 CM
BH CV ECHO MEAS - LA/AO: 1
BH CV ECHO MEAS - LAD MAJOR: 6.1 CM
BH CV ECHO MEAS - LAT PEAK E' VEL: 12.3 CM/SEC
BH CV ECHO MEAS - LATERAL E/E' RATIO: 5.2
BH CV ECHO MEAS - LV DIASTOLIC VOL/BSA (35-75): 33.2 ML/M^2
BH CV ECHO MEAS - LV IVRT: 0.07 SEC
BH CV ECHO MEAS - LV MASS(C)D: 165.8 GRAMS
BH CV ECHO MEAS - LV MASS(C)DI: 74.3 GRAMS/M^2
BH CV ECHO MEAS - LV MAX PG: 3.9 MMHG
BH CV ECHO MEAS - LV MEAN PG: 2 MMHG
BH CV ECHO MEAS - LV SYSTOLIC VOL/BSA (12-30): 13.4 ML/M^2
BH CV ECHO MEAS - LV V1 MAX: 98.5 CM/SEC
BH CV ECHO MEAS - LV V1 MEAN: 64.2 CM/SEC
BH CV ECHO MEAS - LV V1 VTI: 23 CM
BH CV ECHO MEAS - LVIDD: 4 CM
BH CV ECHO MEAS - LVIDS: 2.9 CM
BH CV ECHO MEAS - LVLD AP2: 7.4 CM
BH CV ECHO MEAS - LVLD AP4: 8 CM
BH CV ECHO MEAS - LVLS AP2: 6.7 CM
BH CV ECHO MEAS - LVLS AP4: 6.9 CM
BH CV ECHO MEAS - LVOT AREA (M): 4.2 CM^2
BH CV ECHO MEAS - LVOT AREA: 4.2 CM^2
BH CV ECHO MEAS - LVOT DIAM: 2.3 CM
BH CV ECHO MEAS - LVPWD: 1.2 CM
BH CV ECHO MEAS - MED PEAK E' VEL: 8 CM/SEC
BH CV ECHO MEAS - MEDIAL E/E' RATIO: 8
BH CV ECHO MEAS - MV A MAX VEL: 51.8 CM/SEC
BH CV ECHO MEAS - MV DEC SLOPE: 451 CM/SEC^2
BH CV ECHO MEAS - MV DEC TIME: 0.19 SEC
BH CV ECHO MEAS - MV E MAX VEL: 64.4 CM/SEC
BH CV ECHO MEAS - MV E/A: 1.2
BH CV ECHO MEAS - MV P1/2T MAX VEL: 89.6 CM/SEC
BH CV ECHO MEAS - MV P1/2T: 58.2 MSEC
BH CV ECHO MEAS - MVA P1/2T LCG: 2.5 CM^2
BH CV ECHO MEAS - MVA(P1/2T): 3.8 CM^2
BH CV ECHO MEAS - SI(AO): 109.8 ML/M^2
BH CV ECHO MEAS - SI(CUBED): 16.8 ML/M^2
BH CV ECHO MEAS - SI(LVOT): 42.8 ML/M^2
BH CV ECHO MEAS - SI(MOD-SP2): 15.7 ML/M^2
BH CV ECHO MEAS - SI(MOD-SP4): 19.7 ML/M^2
BH CV ECHO MEAS - SI(TEICH): 16 ML/M^2
BH CV ECHO MEAS - SV(AO): 245.1 ML
BH CV ECHO MEAS - SV(CUBED): 37.4 ML
BH CV ECHO MEAS - SV(LVOT): 95.6 ML
BH CV ECHO MEAS - SV(MOD-SP2): 35 ML
BH CV ECHO MEAS - SV(MOD-SP4): 44 ML
BH CV ECHO MEAS - SV(TEICH): 35.8 ML
BH CV ECHO MEAS - TAPSE (>1.6): 2.4 CM
BH CV ECHO MEASUREMENTS AVERAGE E/E' RATIO: 6.34
BH CV STRESS BP STAGE 1: NORMAL
BH CV STRESS DURATION MIN STAGE 1: 3
BH CV STRESS DURATION MIN STAGE 2: 2
BH CV STRESS DURATION SEC STAGE 1: 0
BH CV STRESS DURATION SEC STAGE 2: 3
BH CV STRESS GRADE STAGE 1: 10
BH CV STRESS GRADE STAGE 2: 12
BH CV STRESS HR STAGE 1: 139
BH CV STRESS HR STAGE 2: 164
BH CV STRESS METS STAGE 1: 5
BH CV STRESS METS STAGE 2: 7.5
BH CV STRESS O2 STAGE 1: 98
BH CV STRESS O2 STAGE 2: 96
BH CV STRESS PROTOCOL 1: NORMAL
BH CV STRESS RECOVERY BP: NORMAL MMHG
BH CV STRESS RECOVERY HR: 88 BPM
BH CV STRESS RECOVERY O2: 98 %
BH CV STRESS SPEED STAGE 1: 1.7
BH CV STRESS SPEED STAGE 2: 2.5
BH CV STRESS STAGE 1: 1
BH CV STRESS STAGE 2: 2
BH CV VAS BP RIGHT ARM: NORMAL MMHG
BH CV XLRA - RV BASE: 3.3 CM
BH CV XLRA - RV MID: 3.3 CM
BH CV XLRA - TDI S': 11.4 CM/SEC
IVRT: 74 MSEC
LEFT ATRIUM VOLUME INDEX: 26 ML/M^2
LEFT ATRIUM VOLUME: 58 ML
MAXIMAL PREDICTED HEART RATE: 160 BPM
MAXIMAL PREDICTED HEART RATE: 160 BPM
PERCENT MAX PREDICTED HR: 103.75 %
STRESS BASELINE BP: NORMAL MMHG
STRESS BASELINE HR: 70 BPM
STRESS O2 SAT REST: 98 %
STRESS PERCENT HR: 122 %
STRESS POST ESTIMATED WORKLOAD: 7 METS
STRESS POST EXERCISE DUR MIN: 5 MIN
STRESS POST EXERCISE DUR SEC: 3 SEC
STRESS POST O2 SAT PEAK: 96 %
STRESS POST PEAK BP: NORMAL MMHG
STRESS POST PEAK HR: 166 BPM
STRESS TARGET HR: 136 BPM
STRESS TARGET HR: 136 BPM

## 2021-04-26 PROCEDURE — 93017 CV STRESS TEST TRACING ONLY: CPT

## 2021-04-26 PROCEDURE — 93306 TTE W/DOPPLER COMPLETE: CPT | Performed by: INTERNAL MEDICINE

## 2021-04-26 PROCEDURE — 93306 TTE W/DOPPLER COMPLETE: CPT

## 2021-04-26 PROCEDURE — 93018 CV STRESS TEST I&R ONLY: CPT | Performed by: INTERNAL MEDICINE

## 2021-04-27 ENCOUNTER — OFFICE VISIT (OUTPATIENT)
Dept: FAMILY MEDICINE CLINIC | Facility: CLINIC | Age: 61
End: 2021-04-27

## 2021-04-27 ENCOUNTER — TELEPHONE (OUTPATIENT)
Dept: CARDIOLOGY | Facility: HOSPITAL | Age: 61
End: 2021-04-27

## 2021-04-27 VITALS
OXYGEN SATURATION: 98 % | SYSTOLIC BLOOD PRESSURE: 120 MMHG | BODY MASS INDEX: 29.16 KG/M2 | DIASTOLIC BLOOD PRESSURE: 60 MMHG | WEIGHT: 220 LBS | HEIGHT: 73 IN | HEART RATE: 70 BPM

## 2021-04-27 DIAGNOSIS — G89.29 CHRONIC LEFT SHOULDER PAIN: ICD-10-CM

## 2021-04-27 DIAGNOSIS — M25.512 BILATERAL SHOULDER PAIN, UNSPECIFIED CHRONICITY: ICD-10-CM

## 2021-04-27 DIAGNOSIS — M25.511 BILATERAL SHOULDER PAIN, UNSPECIFIED CHRONICITY: ICD-10-CM

## 2021-04-27 DIAGNOSIS — F41.1 GENERALIZED ANXIETY DISORDER: ICD-10-CM

## 2021-04-27 DIAGNOSIS — R07.9 LEFT-SIDED CHEST PAIN: ICD-10-CM

## 2021-04-27 DIAGNOSIS — K22.70 BARRETT'S ESOPHAGUS WITHOUT DYSPLASIA: ICD-10-CM

## 2021-04-27 DIAGNOSIS — R73.03 PREDIABETES: ICD-10-CM

## 2021-04-27 DIAGNOSIS — Z12.11 COLON CANCER SCREENING: ICD-10-CM

## 2021-04-27 DIAGNOSIS — E55.9 VITAMIN D DEFICIENCY: ICD-10-CM

## 2021-04-27 DIAGNOSIS — Z00.00 PREVENTATIVE HEALTH CARE: Primary | ICD-10-CM

## 2021-04-27 DIAGNOSIS — M25.512 CHRONIC LEFT SHOULDER PAIN: ICD-10-CM

## 2021-04-27 DIAGNOSIS — R07.9 CHEST PAIN, UNSPECIFIED TYPE: Primary | ICD-10-CM

## 2021-04-27 DIAGNOSIS — R94.39 ABNORMAL STRESS TEST: ICD-10-CM

## 2021-04-27 PROCEDURE — 99396 PREV VISIT EST AGE 40-64: CPT | Performed by: INTERNAL MEDICINE

## 2021-04-27 RX ORDER — METHYLPREDNISOLONE 4 MG/1
TABLET ORAL
Qty: 21 EACH | Refills: 0 | Status: SHIPPED | OUTPATIENT
Start: 2021-04-27 | End: 2021-06-23

## 2021-04-27 NOTE — TELEPHONE ENCOUNTER
"Called patient with echo and stress test results. Echo showed evidence of LVH and mildly dilated ascending aorta likely related to uncontrolled HTN. Recently started on amlodipine and he thinks it is helping. He is not regularly monitoring BP but says he feels better. GXT did not show evidence of ischemia they did have exertional chest pain.  He reports it was a very quick pain and a \"soreness\".  He said he feels sore today.  He does say that most of his symptoms tend to happen with exertion.  We will proceed with CTA of the coronaries to further define etiology of chest pain.  I did advise him to start monitoring his blood pressure regularly and keep his upcoming follow-up with me.  Patient verbalized understanding with no further questions or concerns  "

## 2021-04-27 NOTE — PROGRESS NOTES
Chief Complaint   Patient presents with   • Annual Exam       HPI:  Félix Brian is a 61 y.o. male who presents today for annual physical.  His main concern today is worsening left shoulder pain.  He had to reschedule his MRI.  He is established with orthopedics.  He continues to have left-sided chest pain after stress test yesterday.  Reports it is sore to touch.    ROS:  Constitutional: no fevers, night sweats or unexplained weight loss  Eyes: no vision changes  ENT: no runny nose, ear pain, sore throat  Cardio: no chest pain, palpitations  Pulm: no shortness of breath, wheezing, or cough  GI: no abdominal pain or changes in bowel movements  : no difficulty urinating  MSK: no difficulty ambulating, no joint pain  Neuro: no weakness, dizziness or headache  Psych: no trouble sleeping  Endo: no change in appetite      Past Medical History:   Diagnosis Date   • Boston esophagus       Family History   Problem Relation Age of Onset   • Cancer Mother    • No Known Problems Father    • Diabetes Brother    • Cancer Maternal Grandmother    • Cancer Maternal Grandfather    • Cancer Paternal Grandmother    • Cancer Paternal Grandfather       Social History     Socioeconomic History   • Marital status:      Spouse name: Not on file   • Number of children: Not on file   • Years of education: Not on file   • Highest education level: Not on file   Tobacco Use   • Smoking status: Never Smoker   • Smokeless tobacco: Never Used   Substance and Sexual Activity   • Alcohol use: No   • Drug use: No   • Sexual activity: Defer      No Known Allergies     There is no immunization history on file for this patient.     PE:  Vitals:    04/27/21 1336   BP: 120/60   Pulse: 70   SpO2: 98%      Body mass index is 29.15 kg/m².    Gen Appearance: NAD  HEENT: Normocephalic, PERRLA, no thyromegaly, trache midline  Heart: RRR, normal S1 and S2, no murmur  Lungs: CTA b/l, no wheezing, no crackles  Abdomen: Soft, non-tender,  non-distended, no guarding and BSx4  MSK: Moves all extremities well, normal gait, no peripheral edema  Pulses: Palpable and equal b/l  Lymph nodes: No palpable lymphadenopathy   Neuro: No focal deficits      Current Outpatient Medications   Medication Sig Dispense Refill   • amLODIPine (NORVASC) 5 MG tablet Take 1 tablet by mouth Daily. 30 tablet 3   • hydrOXYzine (ATARAX) 25 MG tablet TAKE 2 TABLETS BY MOUTH EVERY 6 HOURS AS NEEDED FOR ANXIETY 30 tablet 0   • omeprazole (priLOSEC) 40 MG capsule Take 40 mg by mouth Daily.     • Vitamin D, Cholecalciferol, (CHOLECALCIFEROL) 10 MCG (400 UNIT) tablet Take 400 Units by mouth Daily.     • Cholecalciferol (Vitamin D-3) 125 MCG (5000 UT) tablet Take 1 tablet by mouth Daily. 90 tablet 1   • methylPREDNISolone (MEDROL) 4 MG dose pack Take as directed on package instructions. 21 each 0     No current facility-administered medications for this visit.        Diagnoses and all orders for this visit:    1. Preventative health care (Primary)  Counseled on healthy weight, nutrition, physical activity, cancer screening, and immunizations.  Reviewed blood work with patient.  Counseled on lifestyle changes for prediabetes.  He is currently taking 6 david-8-1 soda per day.  We will plan on eliminating these from diet and recheck A1c in 6 months.  Follow-up with cardiology as scheduled for further testing.  He reports his last colonoscopy was over 10 years ago.  Data deficient today.  We will go ahead and order repeat colonoscopy but will need to request records.    2. Bilateral shoulder pain, unspecified chronicity    3. Boston's esophagus without dysplasia    4. Colon cancer screening  -     Cancel: Ambulatory Referral For Screening Colonoscopy  -     Ambulatory Referral For Screening Colonoscopy    5. Generalized anxiety disorder    6. Prediabetes    7. Left-sided chest pain    8. Vitamin D deficiency  -     Cholecalciferol (Vitamin D-3) 125 MCG (5000 UT) tablet; Take 1 tablet by  mouth Daily.  Dispense: 90 tablet; Refill: 1    9. Chronic left shoulder pain  -     methylPREDNISolone (MEDROL) 4 MG dose pack; Take as directed on package instructions.  Dispense: 21 each; Refill: 0  Medrol Dosepak for continued left-sided shoulder pain.       Return in about 6 months (around 10/27/2021) for a1c, prediabetes.     Please note that portions of this document were completed with a voice recognition program. Efforts were made to edit the dictations, but occasionally words are mis-transcribed.

## 2021-04-29 ENCOUNTER — APPOINTMENT (OUTPATIENT)
Dept: MRI IMAGING | Facility: HOSPITAL | Age: 61
End: 2021-04-29

## 2021-05-03 ENCOUNTER — TELEPHONE (OUTPATIENT)
Dept: ORTHOPEDIC SURGERY | Facility: CLINIC | Age: 61
End: 2021-05-03

## 2021-05-03 DIAGNOSIS — R20.2 NUMBNESS AND TINGLING IN RIGHT HAND: ICD-10-CM

## 2021-05-03 DIAGNOSIS — M79.641 RIGHT HAND PAIN: Primary | ICD-10-CM

## 2021-05-03 DIAGNOSIS — R20.0 NUMBNESS AND TINGLING IN RIGHT HAND: ICD-10-CM

## 2021-05-03 NOTE — TELEPHONE ENCOUNTER
Caller:  SARAH FERRER    Relationship: SELF    Best call back number: [phone]    What orders are you requesting (i.e. lab or imaging): EMG TEST    Additional notes: PATIENT WOULD LIKE A CALL BACK TO DISCUSS EMG APPT, PATIENT STATED THAT HE HAS NOT HEARD FROM ANYONE TO GET APPT SET UP AND HE HAS A F/U APPT WITH YURY BERNAL ON 5-13-21

## 2021-05-03 NOTE — TELEPHONE ENCOUNTER
DAYAM with Central scheduling number for him to schedule EMG.  I did inform him if it is after his appointment to let us know and we will reschedule as needed.

## 2021-05-06 DIAGNOSIS — F41.1 GENERALIZED ANXIETY DISORDER: ICD-10-CM

## 2021-05-07 RX ORDER — HYDROXYZINE HYDROCHLORIDE 25 MG/1
TABLET, FILM COATED ORAL
Qty: 30 TABLET | Refills: 0 | Status: SHIPPED | OUTPATIENT
Start: 2021-05-07 | End: 2021-05-27

## 2021-05-12 ENCOUNTER — HOSPITAL ENCOUNTER (OUTPATIENT)
Dept: MRI IMAGING | Facility: HOSPITAL | Age: 61
Discharge: HOME | End: 2021-05-12
Admitting: ORTHOPAEDIC SURGERY

## 2021-05-12 DIAGNOSIS — S46.002A ROTATOR CUFF INJURY, LEFT, INITIAL ENCOUNTER: ICD-10-CM

## 2021-05-12 DIAGNOSIS — M75.42 IMPINGEMENT SYNDROME OF LEFT SHOULDER: ICD-10-CM

## 2021-05-12 PROCEDURE — 73221 MRI JOINT UPR EXTREM W/O DYE: CPT

## 2021-05-18 ENCOUNTER — OFFICE VISIT (OUTPATIENT)
Dept: ORTHOPEDIC SURGERY | Facility: CLINIC | Age: 61
End: 2021-05-18

## 2021-05-18 VITALS
DIASTOLIC BLOOD PRESSURE: 91 MMHG | WEIGHT: 220.02 LBS | BODY MASS INDEX: 29.16 KG/M2 | HEIGHT: 73 IN | HEART RATE: 77 BPM | SYSTOLIC BLOOD PRESSURE: 149 MMHG

## 2021-05-18 DIAGNOSIS — M75.02 ADHESIVE CAPSULITIS OF LEFT SHOULDER: Primary | ICD-10-CM

## 2021-05-18 DIAGNOSIS — M75.42 IMPINGEMENT SYNDROME OF LEFT SHOULDER: ICD-10-CM

## 2021-05-18 DIAGNOSIS — S46.002A ROTATOR CUFF INJURY, LEFT, INITIAL ENCOUNTER: ICD-10-CM

## 2021-05-18 PROCEDURE — 99213 OFFICE O/P EST LOW 20 MIN: CPT | Performed by: ORTHOPAEDIC SURGERY

## 2021-05-18 RX ORDER — MELOXICAM 7.5 MG/1
TABLET ORAL
Qty: 30 TABLET | Refills: 1 | Status: SHIPPED | OUTPATIENT
Start: 2021-05-18 | End: 2021-10-19 | Stop reason: SDUPTHER
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
## 2021-05-26 DIAGNOSIS — F41.1 GENERALIZED ANXIETY DISORDER: ICD-10-CM

## 2021-05-27 RX ORDER — HYDROXYZINE HYDROCHLORIDE 25 MG/1
TABLET, FILM COATED ORAL
Qty: 30 TABLET | Refills: 0 | Status: SHIPPED | OUTPATIENT
Start: 2021-05-27 | End: 2021-06-21

## 2021-06-02 ENCOUNTER — HOSPITAL ENCOUNTER (OUTPATIENT)
Dept: NEUROLOGY | Facility: HOSPITAL | Age: 61
Discharge: HOME | End: 2021-06-02
Admitting: PHYSICIAN ASSISTANT

## 2021-06-02 DIAGNOSIS — R20.0 NUMBNESS AND TINGLING IN RIGHT HAND: ICD-10-CM

## 2021-06-02 DIAGNOSIS — R20.2 NUMBNESS AND TINGLING IN RIGHT HAND: ICD-10-CM

## 2021-06-02 DIAGNOSIS — M79.641 RIGHT HAND PAIN: ICD-10-CM

## 2021-06-02 PROCEDURE — 95909 NRV CNDJ TST 5-6 STUDIES: CPT | Performed by: PSYCHIATRY & NEUROLOGY

## 2021-06-02 PROCEDURE — 95886 MUSC TEST DONE W/N TEST COMP: CPT | Performed by: PSYCHIATRY & NEUROLOGY

## 2021-06-02 PROCEDURE — 95909 NRV CNDJ TST 5-6 STUDIES: CPT

## 2021-06-02 PROCEDURE — 95886 MUSC TEST DONE W/N TEST COMP: CPT

## 2021-06-17 ENCOUNTER — OFFICE VISIT (OUTPATIENT)
Dept: ORTHOPEDIC SURGERY | Facility: CLINIC | Age: 61
End: 2021-06-17

## 2021-06-17 VITALS
BODY MASS INDEX: 29.16 KG/M2 | WEIGHT: 220 LBS | DIASTOLIC BLOOD PRESSURE: 80 MMHG | HEART RATE: 76 BPM | HEIGHT: 73 IN | SYSTOLIC BLOOD PRESSURE: 143 MMHG

## 2021-06-17 DIAGNOSIS — M79.641 RIGHT HAND PAIN: ICD-10-CM

## 2021-06-17 DIAGNOSIS — G56.91 NEUROPATHY OF RIGHT HAND: ICD-10-CM

## 2021-06-17 DIAGNOSIS — M65.4 TENDINITIS, DE QUERVAIN'S: Primary | ICD-10-CM

## 2021-06-17 DIAGNOSIS — F41.1 GENERALIZED ANXIETY DISORDER: ICD-10-CM

## 2021-06-17 PROCEDURE — 20550 NJX 1 TENDON SHEATH/LIGAMENT: CPT | Performed by: PHYSICIAN ASSISTANT

## 2021-06-17 PROCEDURE — 99213 OFFICE O/P EST LOW 20 MIN: CPT | Performed by: PHYSICIAN ASSISTANT

## 2021-06-17 RX ADMIN — LIDOCAINE HYDROCHLORIDE 1 ML: 10 INJECTION, SOLUTION EPIDURAL; INFILTRATION; INTRACAUDAL; PERINEURAL at 16:28

## 2021-06-17 RX ADMIN — TRIAMCINOLONE ACETONIDE 20 MG: 40 INJECTION, SUSPENSION INTRA-ARTICULAR; INTRAMUSCULAR at 16:28

## 2021-06-17 NOTE — PROGRESS NOTES
Procedure   Medium Joint Arthrocentesis  Date/Time: 6/17/2021 4:28 PM  Consent given by: patient  Site marked: site marked  Timeout: Immediately prior to procedure a time out was called to verify the correct patient, procedure, equipment, support staff and site/side marked as required   Supporting Documentation  Indications: pain   Procedure Details  Location: wrist - Wrist joint: Right de Quarvains.  Preparation: Patient was prepped and draped in the usual sterile fashion  Needle size: 25 G  Approach: volar  Medications administered: 1 mL lidocaine PF 1% 1 %; 20 mg triamcinolone acetonide 40 MG/ML  Patient tolerance: patient tolerated the procedure well with no immediate complications

## 2021-06-18 RX ORDER — LIDOCAINE HYDROCHLORIDE 10 MG/ML
1 INJECTION, SOLUTION EPIDURAL; INFILTRATION; INTRACAUDAL; PERINEURAL
Status: COMPLETED | OUTPATIENT
Start: 2021-06-17 | End: 2021-06-17

## 2021-06-18 RX ORDER — TRIAMCINOLONE ACETONIDE 40 MG/ML
20 INJECTION, SUSPENSION INTRA-ARTICULAR; INTRAMUSCULAR
Status: COMPLETED | OUTPATIENT
Start: 2021-06-17 | End: 2021-06-17

## 2021-06-21 ENCOUNTER — HOSPITAL ENCOUNTER (OUTPATIENT)
Dept: CT IMAGING | Facility: HOSPITAL | Age: 61
Discharge: HOME | End: 2021-06-21
Admitting: NURSE PRACTITIONER

## 2021-06-21 VITALS
OXYGEN SATURATION: 95 % | RESPIRATION RATE: 16 BRPM | HEIGHT: 73 IN | DIASTOLIC BLOOD PRESSURE: 102 MMHG | TEMPERATURE: 98.4 F | WEIGHT: 225 LBS | SYSTOLIC BLOOD PRESSURE: 138 MMHG | HEART RATE: 64 BPM | BODY MASS INDEX: 29.82 KG/M2

## 2021-06-21 DIAGNOSIS — R07.9 CHEST PAIN, UNSPECIFIED TYPE: ICD-10-CM

## 2021-06-21 DIAGNOSIS — R94.39 ABNORMAL STRESS TEST: ICD-10-CM

## 2021-06-21 PROCEDURE — 75574 CT ANGIO HRT W/3D IMAGE: CPT

## 2021-06-21 PROCEDURE — 0 IOPAMIDOL PER 1 ML: Performed by: NURSE PRACTITIONER

## 2021-06-21 PROCEDURE — 82565 ASSAY OF CREATININE: CPT

## 2021-06-21 RX ORDER — NITROGLYCERIN 0.4 MG/1
TABLET SUBLINGUAL
Status: DISPENSED
Start: 2021-06-21 | End: 2021-06-21

## 2021-06-21 RX ORDER — METOPROLOL TARTRATE 50 MG/1
50 TABLET, FILM COATED ORAL ONCE AS NEEDED
Status: DISCONTINUED | OUTPATIENT
Start: 2021-06-21 | End: 2021-06-22 | Stop reason: HOSPADM

## 2021-06-21 RX ORDER — HYDROXYZINE HYDROCHLORIDE 25 MG/1
TABLET, FILM COATED ORAL
Qty: 30 TABLET | Refills: 0 | Status: SHIPPED | OUTPATIENT
Start: 2021-06-21 | End: 2021-07-09

## 2021-06-21 RX ORDER — SODIUM CHLORIDE 0.9 % (FLUSH) 0.9 %
10 SYRINGE (ML) INJECTION AS NEEDED
Status: DISCONTINUED | OUTPATIENT
Start: 2021-06-21 | End: 2021-06-22 | Stop reason: HOSPADM

## 2021-06-21 RX ORDER — METOPROLOL TARTRATE 50 MG/1
100 TABLET, FILM COATED ORAL ONCE AS NEEDED
Status: DISCONTINUED | OUTPATIENT
Start: 2021-06-21 | End: 2021-06-22 | Stop reason: HOSPADM

## 2021-06-21 RX ORDER — NITROGLYCERIN 0.4 MG/1
0.4 TABLET SUBLINGUAL
Status: CANCELLED | OUTPATIENT
Start: 2021-06-21 | End: 2021-06-21

## 2021-06-21 RX ADMIN — IOPAMIDOL 65 ML: 755 INJECTION, SOLUTION INTRAVENOUS at 12:43

## 2021-06-22 NOTE — PROGRESS NOTES
"Chief Complaint  Hypertension and Follow-up    Subjective    History of Present Illness {CC  Problem List  Visit  Diagnosis   Encounters  Notes  Medications  Labs  Result Review Imaging  Media :23}     Félix Brian presents to [facility] CARDIOLOGY for   History of Present Illness   61-year-old male with anxiety, Boston's esophagus who presents today to follow up on HTN and chest pain.  Patient has a history of chronic chest pain since he was a child.  He had a heart cath at [facility] approximately 5 years ago that was reportedly normal.  He feels symptoms are related to Boston's esophagitis. He had a treadmill stress test which showed decreased exercise tolerance/significant deconditioning.  No significant ST changes.  Had a coronary CTA 6/21 which showed a total calcium score of 76 with focal mid LAD calcification with suspected mild to moderate coronary artery stenosis.  No evidence of significant stenosis elsewhere.  At his last visit he was started on amlodipine  He reports over the past couple of weeks he has had 4 episodes of feeling his heart racing associated with dizziness, lasts 3-5 minutes followed by a \"surreal feeling\" as if he is going to pass out and feels very weak as if he needs to lay down on the ground. He says it really scares him. He says it feels like an intense adrenaline rush. Yesterday he ran out his amlodipine and had an episode of episode of severe chest pressure while chasing a horse as if an elephant was sitting on his chest and it radiated to his neck. He took a nap and felt better. 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 He notes episodes of chest pain and exertional dyspnea seem to be worse and now happening more with physical activity.       He had a heart cath at [facility] 4-5 " "years ago that was reportedly normal    Cardiac risks: Age, gender, hypertension  Objective     Vital Signs:   Vitals:    06/23/21 1116   BP: 138/81   BP Location: Left arm   Patient Position: Sitting   Cuff Size: Adult   Pulse: 70   Resp: 18   Temp: 96.2 °F (35.7 °C)   TempSrc: Temporal   SpO2: 95%   Weight: 101 kg (223 lb 6 oz)   Height: 185.4 cm (73\")     Body mass index is 29.47 kg/m².  Physical Exam  Vitals reviewed.   Constitutional:       Appearance: Normal appearance.   HENT:      Head: Normocephalic.   Eyes:      Extraocular Movements: Extraocular movements intact.      Pupils: Pupils are equal, round, and reactive to light.   Neck:      Vascular: No carotid bruit.   Cardiovascular:      Rate and Rhythm: Normal rate and regular rhythm.      Pulses: Normal pulses.      Heart sounds: Normal heart sounds, S1 normal and S2 normal. No murmur heard.     Pulmonary:      Effort: Pulmonary effort is normal. No respiratory distress.      Breath sounds: Normal breath sounds.   Chest:      Chest wall: No tenderness.   Abdominal:      General: Abdomen is flat. Bowel sounds are normal.      Palpations: Abdomen is soft.   Musculoskeletal:      Cervical back: Neck supple.      Right lower leg: No edema.      Left lower leg: No edema.   Skin:     General: Skin is warm and dry.   Neurological:      General: No focal deficit present.      Mental Status: He is alert and oriented to person, place, and time. Mental status is at baseline.      Coordination: Abnormal coordination:    Psychiatric:         Mood and Affect: Mood normal.         Behavior: Behavior normal.         Thought Content: Thought content normal.              Result Review  Data Reviewed:{ Labs  Result Review  Imaging  Med Tab  Media :23}   SARS-CoV-2 Antibodies (Roche) (04/01/2021 12:19)  Lipid Panel (04/01/2021 12:19)  Vitamin D 25 Hydroxy (04/01/2021 12:19)  TSH+Free T4 (04/01/2021 12:19)  PSA Screen (04/01/2021 12:19)  Hemoglobin A1c (04/01/2021 " "12:19)  Comprehensive Metabolic Panel (04/01/2021 12:19)  CBC & Differential (04/01/2021 12:19)  Treadmill Stress Test (04/26/2021 15:46)  Adult Transthoracic Echo Complete W/ Cont if Necessary Per Protocol (04/26/2021 14:47)  CT Angiogram Coronary (06/21/2021 12:42)         Assessment and Plan {CC Problem List  Visit Diagnosis  ROS  Review (Popup)  Health Maintenance  Quality  BestPractice  Medications  SmartSets  SnapShot Encounters  Media :23}   1. CAD with stable angina  Mild to moderate LAD stenosis noted on CTA of the coronaries.  His initial symptoms were atypical but now it seems that he is developing more typical angina.  We will start him on metoprolol succinate 25 mg daily.  Start atorvastatin 20 mg daily, last lipid panel was not too bad. Start aspirin 81mg daily. Nitro PRN  Will place urgent referral to cardiology to discuss possible need for left heart cath if symptoms persist    2. Near syncope  Due to severity of symptoms will do event monitor to capture \"real time\" events    3. Palpitations  Event monitor    4. Essential hypertension  Appears to be mostly controlled.  He reports most systolic blood pressures are in the 130s to 140s at home.  We will add beta-blocker for angina        Follow Up {Instructions Charge Capture  Follow-up Communications :23}   Return if symptoms worsen or fail to improve.    Patient was given instructions and counseling regarding his condition or for health maintenance advice. Please see specific information pulled into the AVS if appropriate.  Patient was instructed to call the [facility] with any questions, concerns, or worsening symptoms.    *Please note that portions of this note were completed with a voice recognition program. Efforts were made to edit the dictations, but occasionally words are mistranscribed.    "

## 2021-06-23 ENCOUNTER — HOSPITAL ENCOUNTER (OUTPATIENT)
Dept: CARDIOLOGY | Facility: HOSPITAL | Age: 61
Discharge: HOME | End: 2021-06-23

## 2021-06-23 ENCOUNTER — OFFICE VISIT (OUTPATIENT)
Dept: CARDIOLOGY | Facility: HOSPITAL | Age: 61
End: 2021-06-23

## 2021-06-23 VITALS
BODY MASS INDEX: 29.6 KG/M2 | TEMPERATURE: 96.2 F | HEART RATE: 70 BPM | OXYGEN SATURATION: 95 % | RESPIRATION RATE: 18 BRPM | HEIGHT: 73 IN | WEIGHT: 223.38 LBS | SYSTOLIC BLOOD PRESSURE: 138 MMHG | DIASTOLIC BLOOD PRESSURE: 81 MMHG

## 2021-06-23 DIAGNOSIS — R55 NEAR SYNCOPE: ICD-10-CM

## 2021-06-23 DIAGNOSIS — R00.2 PALPITATIONS: ICD-10-CM

## 2021-06-23 DIAGNOSIS — I25.708 CORONARY ARTERY DISEASE OF BYPASS GRAFT OF NATIVE HEART WITH STABLE ANGINA PECTORIS (HCC): Primary | ICD-10-CM

## 2021-06-23 DIAGNOSIS — R06.09 DOE (DYSPNEA ON EXERTION): ICD-10-CM

## 2021-06-23 PROCEDURE — 93270 REMOTE 30 DAY ECG REV/REPORT: CPT

## 2021-06-23 PROCEDURE — 99214 OFFICE O/P EST MOD 30 MIN: CPT | Performed by: NURSE PRACTITIONER

## 2021-06-23 RX ORDER — METOPROLOL SUCCINATE 25 MG/1
25 TABLET, EXTENDED RELEASE ORAL DAILY
Qty: 30 TABLET | Refills: 3 | Status: SHIPPED | OUTPATIENT
Start: 2021-06-23 | End: 2021-10-27

## 2021-06-23 RX ORDER — ASPIRIN 81 MG/1
81 TABLET ORAL DAILY
Qty: 30 TABLET | Refills: 3 | Status: SHIPPED | OUTPATIENT
Start: 2021-06-23 | End: 2021-11-23

## 2021-06-23 RX ORDER — ATORVASTATIN CALCIUM 20 MG/1
20 TABLET, FILM COATED ORAL DAILY
Qty: 30 TABLET | Refills: 2 | Status: SHIPPED | OUTPATIENT
Start: 2021-06-23 | End: 2021-09-30

## 2021-06-23 RX ORDER — NITROGLYCERIN 0.4 MG/1
TABLET SUBLINGUAL
Qty: 25 TABLET | Refills: 0 | Status: SHIPPED | OUTPATIENT
Start: 2021-06-23 | End: 2021-08-26

## 2021-06-23 NOTE — PROGRESS NOTES
[facility] Heart Monitor Documentation    Félix Brian  1960  [id number]  06/23/21    MARIANNA King    [] ZIO XT Patch  Model [id number] Prescribed for N/A Days    · Serial Number: (N + 9 Digits) N   · Apply-By Date on Box:   · USPS Tracking Number:   · USPS Tracking        [x] [employer] BodyGuardian MINI PLUS Mobile Cardiac Telemetry  Model BGMINIPLUS Prescribed for 30 Days    · Serial Number: (BGM + 7 Digits) [id number]  · Shipped-By Date on Box: [id number]  · UPS Tracking Number: [id number]  · UPS Tracking      [] [employer]ian MINI Holter Monitor  Model BGMINIEL Prescribed for N/A Days    · Serial Number: (7 Digits)   · Shipped-By Date on Box:   · UPS Tracking Number: [id number]  · UPS Tracking        This monitor was applied to the patient's chest and checked for proper functioning.  Mr. Félix Brian was instructed in the proper use of this monitor.  He was given the opportunity to ask questions and left the office with the device 's instruction manual.    Kortney Moctezuma MA, 11:45 EDT, 06/23/21                  [facility]IT[facility] 8.8.2019

## 2021-06-24 LAB — CREAT BLDA-MCNC: 0.8 MG/DL (ref 0.6–1.3)

## 2021-07-08 DIAGNOSIS — F41.1 GENERALIZED ANXIETY DISORDER: ICD-10-CM

## 2021-07-08 RX ORDER — SODIUM, POTASSIUM,MAG SULFATES 17.5-3.13G
2 SOLUTION, RECONSTITUTED, ORAL ORAL TAKE AS DIRECTED
Qty: 354 ML | Refills: 0 | Status: ON HOLD | OUTPATIENT
Start: 2021-07-08 | End: 2021-08-21

## 2021-07-09 RX ORDER — HYDROXYZINE HYDROCHLORIDE 25 MG/1
50 TABLET, FILM COATED ORAL 2 TIMES DAILY PRN
Qty: 90 TABLET | Refills: 3 | Status: SHIPPED | OUTPATIENT
Start: 2021-07-09 | End: 2021-10-27

## 2021-07-20 ENCOUNTER — OFFICE VISIT (OUTPATIENT)
Dept: ORTHOPEDIC SURGERY | Facility: CLINIC | Age: 61
End: 2021-07-20

## 2021-07-20 VITALS
DIASTOLIC BLOOD PRESSURE: 89 MMHG | HEIGHT: 73 IN | HEART RATE: 59 BPM | WEIGHT: 222.66 LBS | BODY MASS INDEX: 29.51 KG/M2 | SYSTOLIC BLOOD PRESSURE: 134 MMHG

## 2021-07-20 DIAGNOSIS — M75.42 IMPINGEMENT SYNDROME OF LEFT SHOULDER: ICD-10-CM

## 2021-07-20 DIAGNOSIS — S46.002A ROTATOR CUFF INJURY, LEFT, INITIAL ENCOUNTER: ICD-10-CM

## 2021-07-20 DIAGNOSIS — M75.02 ADHESIVE CAPSULITIS OF LEFT SHOULDER: Primary | ICD-10-CM

## 2021-07-20 PROCEDURE — 99212 OFFICE O/P EST SF 10 MIN: CPT | Performed by: ORTHOPAEDIC SURGERY
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
## 2021-07-22 ENCOUNTER — TELEPHONE (OUTPATIENT)
Dept: CARDIOLOGY | Facility: HOSPITAL | Age: 61
End: 2021-07-22

## 2021-07-22 NOTE — TELEPHONE ENCOUNTER
Patient called and reports that he received a call that they are trying to reschedule his appoint with Dr. Paula because the heart monitor is not back.  He reports he has not really been wearing the heart monitor very regularly because it is not stuck very well.  He tells me he has been having increasing episodes of chest pain, waking up short of breath and confusion.  He reports symptoms are very severe and has already had a couple today.  I advised him that his cardiac work-up so far has shown evidence of mild to moderate coronary artery stenosis, but the severity of his symptoms are concerning and therefore I have recommended that he go to the emergency room.  I told him I will make sure that they keep his appointment on Monday with Dr. Paula, however, if his symptoms are that severe that he needs to proceed to the ED he verbalized understanding with no further questions or concerns

## 2021-07-26 ENCOUNTER — OFFICE VISIT (OUTPATIENT)
Dept: CARDIOLOGY | Facility: CLINIC | Age: 61
End: 2021-07-26

## 2021-07-26 VITALS
WEIGHT: 231 LBS | HEIGHT: 73 IN | DIASTOLIC BLOOD PRESSURE: 78 MMHG | SYSTOLIC BLOOD PRESSURE: 122 MMHG | BODY MASS INDEX: 30.62 KG/M2 | OXYGEN SATURATION: 98 % | HEART RATE: 68 BPM

## 2021-07-26 DIAGNOSIS — R07.2 PRECORDIAL PAIN: Primary | ICD-10-CM

## 2021-07-26 DIAGNOSIS — R00.2 PALPITATIONS: ICD-10-CM

## 2021-07-26 PROCEDURE — 93000 ELECTROCARDIOGRAM COMPLETE: CPT | Performed by: INTERNAL MEDICINE

## 2021-07-26 PROCEDURE — 99213 OFFICE O/P EST LOW 20 MIN: CPT | Performed by: INTERNAL MEDICINE

## 2021-07-26 RX ORDER — ISOSORBIDE MONONITRATE 30 MG/1
30 TABLET, EXTENDED RELEASE ORAL DAILY
Qty: 30 TABLET | Refills: 11 | Status: ON HOLD | OUTPATIENT
Start: 2021-07-26 | End: 2021-08-21

## 2021-07-26 NOTE — PROGRESS NOTES
[facility] at [facility]  Office visit  Félix Brian  1960  [phone]  There is no work phone number on file.    VISIT DATE:  7/26/2021    PCP: Nik Bolaños DO  [id number] [facility] [id number]    CC:  Chief Complaint   Patient presents with   • Coronary Artery Disease       Previous cardiac studies and procedures:  April 2021  Exercise treadmill test  · The patient exhibited dyspnea and chest tightness at peak exercise.  · Reduced exercise tolerance with an expected exercise duration 8:50, actual 5:03. Patient struggled to stand erect on the treadmill and demonstrated significant deconditioning.  · THR of 136 achieved at 2:42 suggestive of deconditioning or withdrawal from beta-blockade  · Mildly hypertensive response to exercise with a peak pressure of 150/100.  · No significant ST segment shift from baseline with exercise.  Transthoracic echo  · Left ventricular ejection fraction appears to be 56 - 60%. Left ventricular systolic function is normal.  · Left ventricular wall thickness is consistent with mild concentric hypertrophy.  · Mild mitral valve regurgitation is present.  · Borderline mild dilation of the ascending aorta is present.    June 2021 CT coronary angiogram  1. Total calcium score of 76, considered mild.  2. Focal mid LAD calcification, with suspected mild to moderate coronary  artery stenosis. No evidence of potentially significant stenosis  Elsewhere.    ASSESSMENT:   Diagnosis Plan   1. Precordial pain     2. Palpitations         PLAN:  Atypical chest pain: Suspect GI etiology, however cannot completely rule out underlying obstructive coronary disease, vasospasm or tachyarrhythmias.  Adding Imdur 30 mg p.o. daily.  Otherwise continue current medical therapy.  He has had difficulty being able to comply with ambulatory ECG monitor as the patches typically fall off while he is working.  Recommending that he obtain a Gamadorle device in order to  "record rhythm strips during episodes of tachypalpitations and chest discomfort.  We will arrange for more definitive evaluation of coronary anatomy with cardiac catheterization if he continues to have recurrent chest discomfort despite optimization of medical therapy.    Coronary disease: Nonobstructive, mild to moderate.  Continue aspirin, statin and afterload reduction.    Hyperlipidemia: Goal LDL less than 100, ideally less than 70.  Continue atorvastatin 20 mg p.o. daily.    Subjective  Longstanding history of Boston's esophagus.  Now with intermittent episodes of chest discomfort and tachypalpitations which she finds difficult to describe.  Onset usually at rest.  Can last from 5 minutes up to 25 minutes.  Associated fatigue and lightheadedness.  Previous use of sublingual nitroglycerin resulted in headache.  Blood pressures typically running less than 130/80 mmHg.  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  Recent cardiac evaluation has been reassuring.    PHYSICAL EXAMINATION:  Vitals:    07/26/21 1054   BP: 122/78   BP Location: Left arm   Patient Position: Sitting   Pulse: 68   SpO2: 98%   Weight: 105 kg (231 lb)   Height: 185.4 cm (73\")     General Appearance:    Alert, cooperative, no distress, appears stated age   Head:    Normocephalic, without obvious abnormality, atraumatic   Eyes:    conjunctiva/corneas clear   Nose:   Nares normal, septum midline, mucosa normal, no drainage   Throat:   Lips, teeth and gums normal   Neck:   Supple, symmetrical, trachea midline, no carotid    bruit or JVD   Lungs:     Clear to auscultation bilaterally, respirations unlabored   Chest Wall:    No tenderness or deformity    Heart:    Regular rate and rhythm, S1 and S2 normal, no murmur, rub   or gallop, normal carotid impulse bilaterally without bruit.   Abdomen:     Soft, non-tender   Extremities:   Extremities normal, atraumatic, no cyanosis or edema   Pulses:   " 2+ and symmetric all extremities   Skin:   Skin color, texture, turgor normal, no rashes or lesions       Diagnostic Data:    ECG 12 Lead    Date/Time: 7/26/2021 11:00 AM  Performed by: Govind Paula III, MD  Authorized by: Govind Paula III, MD   Comparison: compared with previous ECG from 4/12/2021  Similar to previous ECG  Rhythm: sinus rhythm    Clinical impression: normal ECG          Lab Results   Component Value Date    TRIG 69 04/01/2021    HDL 54 04/01/2021     Lab Results   Component Value Date    GLUCOSE 80 04/01/2021    BUN 15 04/01/2021    CREATININE 0.80 06/21/2021     04/01/2021    K 4.5 04/01/2021     04/01/2021    CO2 25.3 04/01/2021     Lab Results   Component Value Date    HGBA1C 5.69 (H) 04/01/2021     Lab Results   Component Value Date    WBC 7.22 04/01/2021    HGB 15.6 04/01/2021    HCT 44.6 04/01/2021     04/01/2021       Allergies  No Known Allergies    Current Medications    Current Outpatient Medications:   •  amLODIPine (NORVASC) 5 MG tablet, Take 1 tablet by mouth Daily., Disp: 30 tablet, Rfl: 3  •  aspirin 81 MG EC tablet, Take 1 tablet by mouth Daily., Disp: 30 tablet, Rfl: 3  •  atorvastatin (LIPITOR) 20 MG tablet, Take 1 tablet by mouth Daily., Disp: 30 tablet, Rfl: 2  •  Cholecalciferol (Vitamin D-3) 125 MCG (5000 UT) tablet, Take 1 tablet by mouth Daily., Disp: 90 tablet, Rfl: 1  •  hydrOXYzine (ATARAX) 25 MG tablet, Take 2 tablets by mouth 2 (Two) Times a Day As Needed for Anxiety., Disp: 90 tablet, Rfl: 3  •  meloxicam (MOBIC) 7.5 MG tablet, 1 Oral Daily with food., Disp: 30 tablet, Rfl: 1  •  metoprolol succinate XL (TOPROL-XL) 25 MG 24 hr tablet, Take 1 tablet by mouth Daily., Disp: 30 tablet, Rfl: 3  •  nitroglycerin (NITROSTAT) 0.4 MG SL tablet, 1 under the tongue as needed for angina, may repeat q5mins for up three doses, Disp: 25 tablet, Rfl: 0  •  omeprazole (priLOSEC) 40 MG capsule, Take 40 mg by mouth Daily., Disp: , Rfl:   •  sodium-potassium-magnesium  sulfates (Suprep Bowel Prep Kit) 17.5-3.13-1.6 GM/177ML solution oral solution, Take 2 bottles by mouth Take As Directed. DO NOT EAT THE DAY BEFORE YOUR PROCEDURE. IF YOU DIDN'T RECEIVE INSTRUCTIONS CALL [phone], Disp: 354 mL, Rfl: 0  •  Vitamin D, Cholecalciferol, 25 MCG (1000 UT) capsule, Take 5,000 Units by mouth Daily., Disp: , Rfl:           ROS  ROS      SOCIAL HX  Social History     Socioeconomic History   • Marital status:      Spouse name: Not on file   • Number of children: Not on file   • Years of education: Not on file   • Highest education level: Not on file   Tobacco Use   • Smoking status: Never Smoker   • Smokeless tobacco: Never Used   Substance and Sexual Activity   • Alcohol use: No   • Drug use: No   • Sexual activity: Defer       FAMILY HX  Family History   Problem Relation Age of Onset   • Cancer Mother    • No Known Problems Father    • Diabetes Brother    • Cancer Maternal Grandmother    • Cancer Maternal Grandfather    • Cancer Paternal Grandmother    • Cancer Paternal Grandfather              Govind Paula III, MD, FACC

## 2021-08-03 ENCOUNTER — OFFICE VISIT (OUTPATIENT)
Dept: NEUROLOGY | Facility: CLINIC | Age: 61
End: 2021-08-03

## 2021-08-03 VITALS
WEIGHT: 229 LBS | HEART RATE: 65 BPM | HEIGHT: 73 IN | BODY MASS INDEX: 30.35 KG/M2 | SYSTOLIC BLOOD PRESSURE: 124 MMHG | OXYGEN SATURATION: 95 % | DIASTOLIC BLOOD PRESSURE: 80 MMHG

## 2021-08-03 DIAGNOSIS — G56.91 NEUROPATHY OF RIGHT HAND: Primary | ICD-10-CM

## 2021-08-03 PROCEDURE — 99214 OFFICE O/P EST MOD 30 MIN: CPT | Performed by: PSYCHIATRY & NEUROLOGY

## 2021-08-03 RX ORDER — POLYETHYLENE GLYCOL 3350, SODIUM SULFATE ANHYDROUS, SODIUM BICARBONATE, SODIUM CHLORIDE, POTASSIUM CHLORIDE 236; 22.74; 6.74; 5.86; 2.97 G/4L; G/4L; G/4L; G/4L; G/4L
POWDER, FOR SOLUTION ORAL
Status: ON HOLD | COMMUNITY
Start: 2021-07-09 | End: 2021-08-21

## 2021-08-03 RX ORDER — GABAPENTIN 300 MG/1
300 CAPSULE ORAL 2 TIMES DAILY
Qty: 60 CAPSULE | Refills: 5 | Status: SHIPPED | OUTPATIENT
Start: 2021-08-03 | End: 2021-09-28
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
## 2021-08-21 ENCOUNTER — HOSPITAL ENCOUNTER (OUTPATIENT)
Facility: HOSPITAL | Age: 61
Discharge: HOME | End: 2021-08-25
Attending: EMERGENCY MEDICINE | Admitting: FAMILY MEDICINE

## 2021-08-21 ENCOUNTER — APPOINTMENT (OUTPATIENT)
Dept: GENERAL RADIOLOGY | Facility: HOSPITAL | Age: 61
End: 2021-08-21

## 2021-08-21 ENCOUNTER — APPOINTMENT (OUTPATIENT)
Dept: CT IMAGING | Facility: HOSPITAL | Age: 61
End: 2021-08-21

## 2021-08-21 DIAGNOSIS — R07.9 CHEST PAIN, UNSPECIFIED TYPE: Primary | ICD-10-CM

## 2021-08-21 DIAGNOSIS — G31.84 MILD COGNITIVE IMPAIRMENT WITH MEMORY LOSS: ICD-10-CM

## 2021-08-21 DIAGNOSIS — R06.09 DOE (DYSPNEA ON EXERTION): ICD-10-CM

## 2021-08-21 DIAGNOSIS — F41.9 ANXIETY: ICD-10-CM

## 2021-08-21 DIAGNOSIS — I20.0 UNSTABLE ANGINA (HCC): ICD-10-CM

## 2021-08-21 LAB
ALBUMIN SERPL-MCNC: 4.3 G/DL (ref 3.5–5.2)
ALBUMIN/GLOB SERPL: 1.5 G/DL
ALP SERPL-CCNC: 85 U/L (ref 39–117)
ALT SERPL W P-5'-P-CCNC: 31 U/L (ref 1–41)
ANION GAP SERPL CALCULATED.3IONS-SCNC: 16 MMOL/L (ref 5–15)
AST SERPL-CCNC: 22 U/L (ref 1–40)
BASOPHILS # BLD AUTO: 0.02 10*3/MM3 (ref 0–0.2)
BASOPHILS NFR BLD AUTO: 0.2 % (ref 0–1.5)
BILIRUB SERPL-MCNC: 0.8 MG/DL (ref 0–1.2)
BUN SERPL-MCNC: 10 MG/DL (ref 8–23)
BUN/CREAT SERPL: 9.9 (ref 7–25)
CALCIUM SPEC-SCNC: 9.9 MG/DL (ref 8.6–10.5)
CHLORIDE SERPL-SCNC: 106 MMOL/L (ref 98–107)
CO2 SERPL-SCNC: 19 MMOL/L (ref 22–29)
CREAT SERPL-MCNC: 1.01 MG/DL (ref 0.76–1.27)
D DIMER PPP FEU-MCNC: <0.27 MCGFEU/ML (ref 0–0.56)
D-LACTATE SERPL-SCNC: 1.1 MMOL/L (ref 0.5–2)
DEPRECATED RDW RBC AUTO: 40.4 FL (ref 37–54)
EOSINOPHIL # BLD AUTO: 0.01 10*3/MM3 (ref 0–0.4)
EOSINOPHIL NFR BLD AUTO: 0.1 % (ref 0.3–6.2)
ERYTHROCYTE [DISTWIDTH] IN BLOOD BY AUTOMATED COUNT: 13 % (ref 12.3–15.4)
GFR SERPL CREATININE-BSD FRML MDRD: 75 ML/MIN/1.73
GLOBULIN UR ELPH-MCNC: 2.8 GM/DL
GLUCOSE BLDC GLUCOMTR-MCNC: 104 MG/DL (ref 70–130)
GLUCOSE SERPL-MCNC: 140 MG/DL (ref 65–99)
HCT VFR BLD AUTO: 43.2 % (ref 37.5–51)
HGB BLD-MCNC: 15.3 G/DL (ref 13–17.7)
HOLD SPECIMEN: NORMAL
IMM GRANULOCYTES # BLD AUTO: 0.03 10*3/MM3 (ref 0–0.05)
IMM GRANULOCYTES NFR BLD AUTO: 0.3 % (ref 0–0.5)
LIPASE SERPL-CCNC: 25 U/L (ref 13–60)
LYMPHOCYTES # BLD AUTO: 1.23 10*3/MM3 (ref 0.7–3.1)
LYMPHOCYTES NFR BLD AUTO: 13.2 % (ref 19.6–45.3)
MCH RBC QN AUTO: 30.4 PG (ref 26.6–33)
MCHC RBC AUTO-ENTMCNC: 35.4 G/DL (ref 31.5–35.7)
MCV RBC AUTO: 85.7 FL (ref 79–97)
MONOCYTES # BLD AUTO: 0.34 10*3/MM3 (ref 0.1–0.9)
MONOCYTES NFR BLD AUTO: 3.7 % (ref 5–12)
NEUTROPHILS NFR BLD AUTO: 7.67 10*3/MM3 (ref 1.7–7)
NEUTROPHILS NFR BLD AUTO: 82.5 % (ref 42.7–76)
NRBC BLD AUTO-RTO: 0 /100 WBC (ref 0–0.2)
NT-PROBNP SERPL-MCNC: 106.5 PG/ML (ref 0–900)
PLATELET # BLD AUTO: 246 10*3/MM3 (ref 140–450)
PMV BLD AUTO: 9.8 FL (ref 6–12)
POTASSIUM SERPL-SCNC: 3.9 MMOL/L (ref 3.5–5.2)
PROCALCITONIN SERPL-MCNC: 0.02 NG/ML (ref 0–0.25)
PROT SERPL-MCNC: 7.1 G/DL (ref 6–8.5)
QT INTERVAL: 404 MS
QT INTERVAL: 418 MS
QTC INTERVAL: 424 MS
QTC INTERVAL: 426 MS
RBC # BLD AUTO: 5.04 10*6/MM3 (ref 4.14–5.8)
SARS-COV-2 RNA RESP QL NAA+PROBE: NOT DETECTED
SODIUM SERPL-SCNC: 141 MMOL/L (ref 136–145)
TROPONIN T SERPL-MCNC: <0.01 NG/ML (ref 0–0.03)
WBC # BLD AUTO: 9.3 10*3/MM3 (ref 3.4–10.8)
WHOLE BLOOD HOLD SPECIMEN: NORMAL

## 2021-08-21 PROCEDURE — 96374 THER/PROPH/DIAG INJ IV PUSH: CPT

## 2021-08-21 PROCEDURE — 84145 PROCALCITONIN (PCT): CPT | Performed by: INTERNAL MEDICINE

## 2021-08-21 PROCEDURE — 25010000002 LORAZEPAM PER 2 MG: Performed by: EMERGENCY MEDICINE

## 2021-08-21 PROCEDURE — 96361 HYDRATE IV INFUSION ADD-ON: CPT

## 2021-08-21 PROCEDURE — 83880 ASSAY OF NATRIURETIC PEPTIDE: CPT | Performed by: EMERGENCY MEDICINE

## 2021-08-21 PROCEDURE — 96372 THER/PROPH/DIAG INJ SC/IM: CPT

## 2021-08-21 PROCEDURE — G0378 HOSPITAL OBSERVATION PER HR: HCPCS

## 2021-08-21 PROCEDURE — 85379 FIBRIN DEGRADATION QUANT: CPT | Performed by: INTERNAL MEDICINE

## 2021-08-21 PROCEDURE — U0003 INFECTIOUS AGENT DETECTION BY NUCLEIC ACID (DNA OR RNA); SEVERE ACUTE RESPIRATORY SYNDROME CORONAVIRUS 2 (SARS-COV-2) (CORONAVIRUS DISEASE [COVID-19]), AMPLIFIED PROBE TECHNIQUE, MAKING USE OF HIGH THROUGHPUT TECHNOLOGIES AS DESCRIBED BY CMS-2020-01-R: HCPCS | Performed by: INTERNAL MEDICINE

## 2021-08-21 PROCEDURE — 83605 ASSAY OF LACTIC ACID: CPT | Performed by: INTERNAL MEDICINE

## 2021-08-21 PROCEDURE — 25010000002 HEPARIN (PORCINE) PER 1000 UNITS: Performed by: INTERNAL MEDICINE

## 2021-08-21 PROCEDURE — 84484 ASSAY OF TROPONIN QUANT: CPT | Performed by: EMERGENCY MEDICINE

## 2021-08-21 PROCEDURE — 93005 ELECTROCARDIOGRAM TRACING: CPT

## 2021-08-21 PROCEDURE — 84484 ASSAY OF TROPONIN QUANT: CPT | Performed by: INTERNAL MEDICINE

## 2021-08-21 PROCEDURE — 99219 PR INITIAL OBSERVATION CARE/DAY 50 MINUTES: CPT | Performed by: INTERNAL MEDICINE

## 2021-08-21 PROCEDURE — 83690 ASSAY OF LIPASE: CPT | Performed by: EMERGENCY MEDICINE

## 2021-08-21 PROCEDURE — 93005 ELECTROCARDIOGRAM TRACING: CPT | Performed by: EMERGENCY MEDICINE

## 2021-08-21 PROCEDURE — 25010000002 ENOXAPARIN PER 10 MG: Performed by: EMERGENCY MEDICINE

## 2021-08-21 PROCEDURE — 85025 COMPLETE CBC W/AUTO DIFF WBC: CPT | Performed by: EMERGENCY MEDICINE

## 2021-08-21 PROCEDURE — 82962 GLUCOSE BLOOD TEST: CPT

## 2021-08-21 PROCEDURE — 70450 CT HEAD/BRAIN W/O DYE: CPT

## 2021-08-21 PROCEDURE — 87040 BLOOD CULTURE FOR BACTERIA: CPT | Performed by: INTERNAL MEDICINE

## 2021-08-21 PROCEDURE — C9803 HOPD COVID-19 SPEC COLLECT: HCPCS

## 2021-08-21 PROCEDURE — 36415 COLL VENOUS BLD VENIPUNCTURE: CPT

## 2021-08-21 PROCEDURE — 71045 X-RAY EXAM CHEST 1 VIEW: CPT

## 2021-08-21 PROCEDURE — 80053 COMPREHEN METABOLIC PANEL: CPT | Performed by: EMERGENCY MEDICINE

## 2021-08-21 PROCEDURE — 99284 EMERGENCY DEPT VISIT MOD MDM: CPT

## 2021-08-21 RX ORDER — LORAZEPAM 2 MG/ML
0.5 INJECTION INTRAMUSCULAR ONCE
Status: COMPLETED | OUTPATIENT
Start: 2021-08-21 | End: 2021-08-21

## 2021-08-21 RX ORDER — HEPARIN SODIUM 5000 [USP'U]/ML
5000 INJECTION, SOLUTION INTRAVENOUS; SUBCUTANEOUS EVERY 8 HOURS SCHEDULED
Status: DISCONTINUED | OUTPATIENT
Start: 2021-08-21 | End: 2021-08-25 | Stop reason: HOSPADM

## 2021-08-21 RX ORDER — NALOXONE HCL 0.4 MG/ML
0.4 VIAL (ML) INJECTION
Status: DISCONTINUED | OUTPATIENT
Start: 2021-08-21 | End: 2021-08-25 | Stop reason: HOSPADM

## 2021-08-21 RX ORDER — ACETAMINOPHEN 650 MG/1
650 SUPPOSITORY RECTAL EVERY 4 HOURS PRN
Status: DISCONTINUED | OUTPATIENT
Start: 2021-08-21 | End: 2021-08-25 | Stop reason: HOSPADM

## 2021-08-21 RX ORDER — HYDROXYZINE HYDROCHLORIDE 25 MG/1
50 TABLET, FILM COATED ORAL 2 TIMES DAILY PRN
Status: DISCONTINUED | OUTPATIENT
Start: 2021-08-21 | End: 2021-08-25 | Stop reason: HOSPADM

## 2021-08-21 RX ORDER — ISOSORBIDE MONONITRATE 30 MG/1
30 TABLET, EXTENDED RELEASE ORAL DAILY
Status: DISCONTINUED | OUTPATIENT
Start: 2021-08-21 | End: 2021-08-25 | Stop reason: HOSPADM

## 2021-08-21 RX ORDER — METOPROLOL SUCCINATE 25 MG/1
25 TABLET, EXTENDED RELEASE ORAL DAILY
Status: DISCONTINUED | OUTPATIENT
Start: 2021-08-21 | End: 2021-08-25 | Stop reason: HOSPADM

## 2021-08-21 RX ORDER — MORPHINE SULFATE 2 MG/ML
1 INJECTION, SOLUTION INTRAMUSCULAR; INTRAVENOUS EVERY 4 HOURS PRN
Status: DISCONTINUED | OUTPATIENT
Start: 2021-08-21 | End: 2021-08-24

## 2021-08-21 RX ORDER — DEXTROSE MONOHYDRATE 25 G/50ML
25 INJECTION, SOLUTION INTRAVENOUS
Status: DISCONTINUED | OUTPATIENT
Start: 2021-08-21 | End: 2021-08-25 | Stop reason: HOSPADM

## 2021-08-21 RX ORDER — ONDANSETRON 2 MG/ML
4 INJECTION INTRAMUSCULAR; INTRAVENOUS EVERY 6 HOURS PRN
Status: DISCONTINUED | OUTPATIENT
Start: 2021-08-21 | End: 2021-08-25 | Stop reason: HOSPADM

## 2021-08-21 RX ORDER — CALCIUM CARBONATE 200(500)MG
2 TABLET,CHEWABLE ORAL ONCE
Status: COMPLETED | OUTPATIENT
Start: 2021-08-21 | End: 2021-08-21

## 2021-08-21 RX ORDER — ATORVASTATIN CALCIUM 20 MG/1
20 TABLET, FILM COATED ORAL DAILY
Status: DISCONTINUED | OUTPATIENT
Start: 2021-08-21 | End: 2021-08-25 | Stop reason: HOSPADM

## 2021-08-21 RX ORDER — ACETAMINOPHEN 160 MG/5ML
650 SOLUTION ORAL EVERY 4 HOURS PRN
Status: DISCONTINUED | OUTPATIENT
Start: 2021-08-21 | End: 2021-08-25 | Stop reason: HOSPADM

## 2021-08-21 RX ORDER — SODIUM CHLORIDE 0.9 % (FLUSH) 0.9 %
10 SYRINGE (ML) INJECTION AS NEEDED
Status: DISCONTINUED | OUTPATIENT
Start: 2021-08-21 | End: 2021-08-25 | Stop reason: HOSPADM

## 2021-08-21 RX ORDER — GABAPENTIN 300 MG/1
300 CAPSULE ORAL 2 TIMES DAILY
Status: DISCONTINUED | OUTPATIENT
Start: 2021-08-21 | End: 2021-08-24

## 2021-08-21 RX ORDER — ONDANSETRON 4 MG/1
4 TABLET, FILM COATED ORAL EVERY 6 HOURS PRN
Status: DISCONTINUED | OUTPATIENT
Start: 2021-08-21 | End: 2021-08-25 | Stop reason: HOSPADM

## 2021-08-21 RX ORDER — ASPIRIN 81 MG/1
324 TABLET, CHEWABLE ORAL ONCE
Status: COMPLETED | OUTPATIENT
Start: 2021-08-21 | End: 2021-08-21

## 2021-08-21 RX ORDER — SODIUM CHLORIDE 9 MG/ML
100 INJECTION, SOLUTION INTRAVENOUS CONTINUOUS
Status: ACTIVE | OUTPATIENT
Start: 2021-08-21 | End: 2021-08-22

## 2021-08-21 RX ORDER — ASPIRIN 81 MG/1
81 TABLET ORAL DAILY
Status: DISCONTINUED | OUTPATIENT
Start: 2021-08-21 | End: 2021-08-25 | Stop reason: HOSPADM

## 2021-08-21 RX ORDER — SODIUM CHLORIDE 0.9 % (FLUSH) 0.9 %
10 SYRINGE (ML) INJECTION EVERY 12 HOURS SCHEDULED
Status: DISCONTINUED | OUTPATIENT
Start: 2021-08-21 | End: 2021-08-25 | Stop reason: HOSPADM

## 2021-08-21 RX ORDER — AMLODIPINE BESYLATE 5 MG/1
5 TABLET ORAL DAILY
Status: DISCONTINUED | OUTPATIENT
Start: 2021-08-21 | End: 2021-08-25 | Stop reason: HOSPADM

## 2021-08-21 RX ORDER — ACETAMINOPHEN 325 MG/1
650 TABLET ORAL EVERY 4 HOURS PRN
Status: DISCONTINUED | OUTPATIENT
Start: 2021-08-21 | End: 2021-08-25 | Stop reason: HOSPADM

## 2021-08-21 RX ORDER — PANTOPRAZOLE SODIUM 40 MG/1
40 TABLET, DELAYED RELEASE ORAL EVERY MORNING
Status: DISCONTINUED | OUTPATIENT
Start: 2021-08-22 | End: 2021-08-21

## 2021-08-21 RX ORDER — PANTOPRAZOLE SODIUM 40 MG/1
40 TABLET, DELAYED RELEASE ORAL EVERY MORNING
Status: DISCONTINUED | OUTPATIENT
Start: 2021-08-21 | End: 2021-08-25 | Stop reason: HOSPADM

## 2021-08-21 RX ORDER — NICOTINE POLACRILEX 4 MG
15 LOZENGE BUCCAL
Status: DISCONTINUED | OUTPATIENT
Start: 2021-08-21 | End: 2021-08-25 | Stop reason: HOSPADM

## 2021-08-21 RX ADMIN — GABAPENTIN 300 MG: 300 CAPSULE ORAL at 20:17

## 2021-08-21 RX ADMIN — SODIUM CHLORIDE, PRESERVATIVE FREE 10 ML: 5 INJECTION INTRAVENOUS at 18:46

## 2021-08-21 RX ADMIN — ATORVASTATIN CALCIUM 20 MG: 20 TABLET, FILM COATED ORAL at 20:17

## 2021-08-21 RX ADMIN — ENOXAPARIN SODIUM 100 MG: 100 INJECTION SUBCUTANEOUS at 18:45

## 2021-08-21 RX ADMIN — HEPARIN SODIUM 5000 UNITS: 5000 INJECTION, SOLUTION INTRAVENOUS; SUBCUTANEOUS at 20:18

## 2021-08-21 RX ADMIN — ASPIRIN 81 MG: 81 TABLET, COATED ORAL at 20:27

## 2021-08-21 RX ADMIN — ASPIRIN 81 MG CHEWABLE TABLET 324 MG: 81 TABLET CHEWABLE at 14:15

## 2021-08-21 RX ADMIN — METOPROLOL SUCCINATE 25 MG: 25 TABLET, EXTENDED RELEASE ORAL at 20:17

## 2021-08-21 RX ADMIN — LORAZEPAM 0.5 MG: 2 INJECTION INTRAMUSCULAR; INTRAVENOUS at 15:33

## 2021-08-21 RX ADMIN — PANTOPRAZOLE SODIUM 40 MG: 40 TABLET, DELAYED RELEASE ORAL at 20:17

## 2021-08-21 RX ADMIN — HYDROXYZINE HYDROCHLORIDE 50 MG: 25 TABLET, FILM COATED ORAL at 20:17

## 2021-08-21 RX ADMIN — NITROGLYCERIN 1 INCH: 20 OINTMENT TOPICAL at 15:33

## 2021-08-21 RX ADMIN — AMLODIPINE BESYLATE 5 MG: 5 TABLET ORAL at 20:17

## 2021-08-21 RX ADMIN — SODIUM CHLORIDE 100 ML/HR: 9 INJECTION, SOLUTION INTRAVENOUS at 20:27

## 2021-08-21 RX ADMIN — SODIUM CHLORIDE, PRESERVATIVE FREE 10 ML: 5 INJECTION INTRAVENOUS at 20:16

## 2021-08-21 RX ADMIN — ANTACID TABLETS 2 TABLET: 500 TABLET, CHEWABLE ORAL at 20:12

## 2021-08-21 RX ADMIN — ISOSORBIDE MONONITRATE 30 MG: 30 TABLET, EXTENDED RELEASE ORAL at 20:17

## 2021-08-21 NOTE — H&P
[facility] Medicine Services  HISTORY AND PHYSICAL    Patient Name: Félix Biran  : 1960  MRN: [id number]  Primary Care Physician: Nik Bolaños DO  Date of admission: 2021      Subjective   Subjective     Chief Complaint:  Chest pain    HPI:  Félix Brian is a 61 y.o. male with a PMH significant for Boston's esophagus, nonobstructive CAD, HTN, anxiety who presents to the ED due to complaints of chest pain.  Patient states that he was working outside today when he began to have left-sided chest pain.  He reports associated diaphoresis, shortness of breath, nausea, palpitations and presyncope.  He reports similar less severe episodes recently and had mild chest discomfort this morning when he woke up.  He denies recent fever, headache, malaise, orthopnea, lower extremity edema.  He reports mild cough for the past few weeks and chills today.  Patient reports frequent urination, dysuria and sensation of not emptying his bladder.    Patient follows with Dr. Paula with cardiology and has been recently evaluated outpatient.  Since April, he had and exercise treadmill test, followed by a CT coronary angiogram which showed no evidence of potentially significant stenosis.  TTE with preserved EF and mild concentric hypertrophy.      COVID Details:    Symptoms:    [] NONE [] Fever []  Cough [] Shortness of breath [] Change in taste/smell      The patient qualifies to receive the vaccine, but they have not yet received it.      Review of Systems   Constitutional: Positive for chills and diaphoresis. Negative for fever.   HENT: Negative.    Eyes: Negative.    Respiratory: Positive for cough, chest tightness and shortness of breath.    Cardiovascular: Positive for chest pain and palpitations.   Gastrointestinal: Positive for nausea. Negative for diarrhea and vomiting.   Endocrine: Negative.    Genitourinary: Positive for difficulty urinating and dysuria.    Musculoskeletal: Negative.    Skin: Negative.    Allergic/Immunologic: Negative.    Neurological: Positive for light-headedness. Negative for headaches.   Hematological: Negative.    Psychiatric/Behavioral: Negative.       All other systems reviewed and are negative.     Personal History     Past Medical History:   Diagnosis Date   • Arthritis    • Boston esophagus    • GERD (gastroesophageal reflux disease)    • Hypertension        Past Surgical History:   Procedure Laterality Date   • ENDOSCOPY         Family History: family history includes Cancer in his maternal grandfather, maternal grandmother, mother, paternal grandfather, and paternal grandmother; Diabetes in his brother; No Known Problems in his father. Otherwise pertinent FHx was reviewed and unremarkable.     Social History:  reports that he has never smoked. He has never used smokeless tobacco. He reports current drug use. Drug: Marijuana. He reports that he does not drink alcohol.  Social History     Social History Narrative    Caffeine: 2 cups coffee in the morning       Medications:  Available home medication information reviewed.  Medications Prior to Admission   Medication Sig Dispense Refill Last Dose   • amLODIPine (NORVASC) 5 MG tablet Take 1 tablet by mouth Daily. 30 tablet 3    • aspirin 81 MG EC tablet Take 1 tablet by mouth Daily. 30 tablet 3    • atorvastatin (LIPITOR) 20 MG tablet Take 1 tablet by mouth Daily. 30 tablet 2    • Cholecalciferol (Vitamin D-3) 125 MCG (5000 UT) tablet Take 1 tablet by mouth Daily. 90 tablet 1    • gabapentin (NEURONTIN) 300 MG capsule Take 1 capsule by mouth 2 (Two) Times a Day. 60 capsule 5    • hydrOXYzine (ATARAX) 25 MG tablet Take 2 tablets by mouth 2 (Two) Times a Day As Needed for Anxiety. 90 tablet 3    • isosorbide mononitrate (IMDUR) 30 MG 24 hr tablet Take 1 tablet by mouth Daily. 30 tablet 11    • meloxicam (MOBIC) 7.5 MG tablet 1 Oral Daily with food. 30 tablet 1    • metoprolol succinate XL  (TOPROL-XL) 25 MG 24 hr tablet Take 1 tablet by mouth Daily. 30 tablet 3    • nitroglycerin (NITROSTAT) 0.4 MG SL tablet 1 under the tongue as needed for angina, may repeat q5mins for up three doses 25 tablet 0    • omeprazole (priLOSEC) 40 MG capsule Take 40 mg by mouth Daily.      • PEG 3350-KCl-NaBcb-NaCl-NaSulf (PEG-3350/Electrolytes) 236 g reconstituted solution TAKE AS DIRECTED PER PROVIDERS INSTRUCTIONS IF YOU DID NOT RECEIVE INSTRUCTIONS CALL       • sodium-potassium-magnesium sulfates (Suprep Bowel Prep Kit) 17.5-3.13-1.6 GM/177ML solution oral solution Take 2 bottles by mouth Take As Directed. DO NOT EAT THE DAY BEFORE YOUR PROCEDURE. IF YOU DIDN'T RECEIVE INSTRUCTIONS CALL [phone] 354 mL 0    • Vitamin D, Cholecalciferol, 25 MCG (1000 UT) capsule Take 5,000 Units by mouth Daily.          No Known Allergies    Objective   Objective     Vital Signs:   Temp:  [97.8 °F (36.6 °C)-98 °F (36.7 °C)] 97.8 °F (36.6 °C)  Heart Rate:  [65-69] 68  Resp:  [22] 22  BP: (122-153)/(77-95) 122/82       Physical Exam   Constitutional: Awake, alert  Eyes: PERRLA, sclerae anicteric, no conjunctival injection  HENT: NCAT, mucous membranes moist  Neck: Supple, no thyromegaly, no lymphadenopathy, trachea midline  Respiratory: Clear to auscultation bilaterally, nonlabored respirations   Cardiovascular: RRR, no murmurs, rubs, or gallops, palpable pedal pulses bilaterally  Gastrointestinal: Positive bowel sounds, soft, nontender, nondistended  Musculoskeletal: No bilateral ankle edema, no clubbing or cyanosis to extremities  Psychiatric: Anxious, cooperative  Neurologic: Oriented x 3, strength symmetric in all extremities, Cranial Nerves grossly intact to confrontation, speech clear  Skin: No rashes      Result Review:  I have personally reviewed the results from the time of this admission to 8/21/2021 20:04 EDT and agree with these findings:  [x]  Laboratory  []  Microbiology  [x]  Radiology  [x]  EKG/Telemetry    []  Cardiology/Vascular   []  Pathology  [x]  Old records  []  Other:      LAB RESULTS:      Lab 08/21/21  1420   WBC 9.30   HEMOGLOBIN 15.3   HEMATOCRIT 43.2   PLATELETS 246   NEUTROS ABS 7.67*   IMMATURE GRANS (ABS) 0.03   LYMPHS ABS 1.23   MONOS ABS 0.34   EOS ABS 0.01   MCV 85.7         Lab 08/21/21  1420   SODIUM 141   POTASSIUM 3.9   CHLORIDE 106   CO2 19.0*   ANION GAP 16.0*   BUN 10   CREATININE 1.01   GLUCOSE 140*   CALCIUM 9.9         Lab 08/21/21  1420   TOTAL PROTEIN 7.1   ALBUMIN 4.30   GLOBULIN 2.8   ALT (SGPT) 31   AST (SGOT) 22   BILIRUBIN 0.8   ALK PHOS 85   LIPASE 25         Lab 08/21/21  1704 08/21/21  1420   PROBNP  --  106.5   TROPONIN T <0.010 <0.010                     Microbiology Results (last 10 days)     Procedure Component Value - Date/Time    COVID PRE-OP / PRE-PROCEDURE SCREENING ORDER (NO ISOLATION) - Swab, Nasopharynx [[id number]]  (Normal) Collected: 08/21/21 1852    Lab Status: Final result Specimen: Swab from Nasopharynx Updated: 08/21/21 1944    Narrative:      The following orders were created for panel order COVID PRE-OP / PRE-PROCEDURE SCREENING ORDER (NO ISOLATION) - Swab, Nasopharynx.  Procedure                               Abnormality         Status                     ---------                               -----------         ------                     COVID-19,CEPHEID/LIS,LE...[[id number]]  Normal              Final result                 Please view results for these tests on the individual orders.    COVID-19,CEPHEID/LIS,[facility] IN-HOUSE(OR EMERGENT/ADD-ON),NP SWAB IN TRANSPORT MEDIA 3-4 HR TAT - Swab, Nasopharynx [[id number]]  (Normal) Collected: 08/21/21 1852    Lab Status: Final result Specimen: Swab from Nasopharynx Updated: 08/21/21 1944     COVID19 Not Detected    Narrative:      Fact sheet for providers: https://www.fda.gov/media/108113/download     Fact sheet for patients: https://www.fda.gov/media/513199/download  Fact sheet for providers:  https://www.fda.gov/media/265640/download     Fact sheet for patients: https://www.fda.gov/media/938070/download          CT Head Without Contrast    Result Date: 8/21/2021  EXAMINATION: CT HEAD WO CONTRAST-  INDICATION: Mental status change, unknown cause  TECHNIQUE: Axial noncontrast CT of the head with multiplanar reconstruction  The radiation dose reduction device was turned on for each scan per the ALARA (As Low as Reasonably Achievable) protocol.  COMPARISON: NONE  FINDINGS: Gray-white differentiation is maintained and there is no evidence of intracranial hemorrhage, mass or mass effect. The ventricles are normal in size and configuration. The orbits are unremarkable and the paranasal sinuses are grossly clear. The calvarium is intact.      Impression: No acute intracranial abnormality.   This report was finalized on 8/21/2021 4:35 PM by Kirt Brady.      XR Chest 1 View    Result Date: 8/21/2021  EXAMINATION: XR CHEST 1 VW-  INDICATION: Chest Pain triage protocol  COMPARISON: NONE  FINDINGS: No focal airspace opacity. No pleural effusion or pneumothorax. Normal heart and mediastinal contours.      Impression: No evidence of acute disease in the chest.  This report was finalized on 8/21/2021 4:04 PM by Kirt Brady.        Results for orders placed during the hospital encounter of 04/26/21    Adult Transthoracic Echo Complete W/ Cont if Necessary Per Protocol    Interpretation Summary  · Left ventricular ejection fraction appears to be 56 - 60%. Left ventricular systolic function is normal.  · Left ventricular wall thickness is consistent with mild concentric hypertrophy.  · Mild mitral valve regurgitation is present.  · Borderline mild dilation of the ascending aorta is present.      Assessment/Plan   Assessment & Plan     Active Hospital Problems    Diagnosis  POA   • **Chest pain [R07.9]  Yes   • Hypertension [I10]  Unknown   • Metabolic acidosis, increased anion gap [E87.2]  Unknown   • Type 2  "diabetes mellitus (CMS/[facility]) [E11.9]  Unknown   • Palpitations [R00.2]  Yes   • Boston's esophagus without dysplasia [K22.70]  Yes   • Generalized anxiety disorder [F41.1]  Yes   Félix Brian is a 61 y.o. male with a PMH significant for Boston's esophagus, nonobstructive CAD, HTN, anxiety who presents to the ED due to complaints of chest pain.    Chest pain/rule out Unstable angina  Palpitations  --aspirin 324 mg, continue 81 mg daily  --statin  --BB  --start ace inhibitor if blood pressure will allow  --Consult Dr. Paula with cardiology  --trend ekg and troponins  --nitro and morphine as needed for ongoing chest pain  --lipid panel, a1c, tsh  --telemetry  -Suspect symptoms could be secondary to anxiety.  Patient had a episode of \"chest pain\"while I was at bedside which was concerning for a panic attack.  --Last cardiology note in EMR from Dr. Paula noted concerns for GI etiology of symptoms    Increase anion gap metabolic acidosis  -Lactic acid, procalcitonin, blood cultures pending  -CBC with significant left shift  -UA pending, will consider further imaging of chest    Boston's esophagus  -Continue home PPI    LEONOR  HTN  -Continue home meds    Diabetes mellitus  -No active home meds  -SSI with Accu-Cheks     DVT prophylaxis: Heparin      CODE STATUS: Full code  Code Status and Medical Interventions:   Ordered at: 08/21/21 1913     Level Of Support Discussed With:    Patient     Code Status:    CPR     Medical Interventions (Level of Support Prior to Arrest):    Full       Admission Status:  I believe this patient meets OBSERVATION status, however if further evaluation or treatment plans warrant, status may change.  Based upon current information, I predict patient's care encounter to be less than or equal to 2 midnights.    Atiya Silva, DO  08/21/21    "

## 2021-08-21 NOTE — ED PROVIDER NOTES
Subjective   Patient is a 61 y.o. male presenting to the ED complaining of chest pain. The patient reports a left lower chest chest pain which began when the patient woke up this morning at 0630. He was able to get up and do chores, but the chest pain did not subside. He states that the pain has been constant today, he describes it as tightness, reports that it radiates to his left shoulder, and rates it as a 4/10. He also states that it is worse with exertion, but relieved by rest. He also reports associated shortness of breath, nausea, diaphoresis, and a single episode of dark stool. He also reports urinary urgency, but stated that this is baseline and unchanged. He denies cough, vomiting, dysuria, and frequency. The patient reports that he has intermittent episodes of chest pain for a few months now, with episodes lasting two to three hours on average. He began to see a cardiologist for this pain, resulting in a treadmill stress test earlier this month. The patient was prescribed nitroglycerin, but has not taken this yet today. He reports not family history of cardiac issues. He does have a history of prediabetes. He denies smoking, alcohol, and drug use. The patient is not immunized against COVID-19. There are no other acute symptoms at this time.    Record review finds mild LAD calcification with suspected mild to moderated coronary artery stenosis.      History provided by:  Patient  Chest Pain  Chest pain location: Left lower chest.  Pain quality: tightness    Pain radiates to:  L shoulder  Pain severity:  Mild  Onset quality:  Sudden  Duration:  8 hours  Timing:  Constant  Progression:  Unchanged  Chronicity:  Recurrent  Relieved by:  Rest  Worsened by:  Exertion  Associated symptoms: diaphoresis, nausea and shortness of breath    Associated symptoms: no cough and no vomiting    Risk factors: hypertension and male sex    Risk factors: no aortic disease, no birth control, no coronary artery disease, no  Alec-Danlos syndrome, no high cholesterol, no Marfan's syndrome, not pregnant, no prior DVT/PE and no smoking        Review of Systems   Constitutional: Positive for diaphoresis.   Respiratory: Positive for shortness of breath. Negative for cough.    Cardiovascular: Positive for chest pain.   Gastrointestinal: Positive for nausea. Negative for vomiting.        Single episode of dark stool.   Genitourinary: Positive for urgency (baseline, unchanged.). Negative for dysuria and frequency.   All other systems reviewed and are negative.      Past Medical History:   Diagnosis Date   • Arthritis    • Boston esophagus    • GERD (gastroesophageal reflux disease)    • Hypertension        No Known Allergies    Past Surgical History:   Procedure Laterality Date   • ENDOSCOPY         Family History   Problem Relation Age of Onset   • Cancer Mother    • No Known Problems Father    • Diabetes Brother    • Cancer Maternal Grandmother    • Cancer Maternal Grandfather    • Cancer Paternal Grandmother    • Cancer Paternal Grandfather        Social History     Socioeconomic History   • Marital status:      Spouse name: Not on file   • Number of children: Not on file   • Years of education: Not on file   • Highest education level: Not on file   Tobacco Use   • Smoking status: Never Smoker   • Smokeless tobacco: Never Used   Vaping Use   • Vaping Use: Never used   Substance and Sexual Activity   • Alcohol use: No   • Drug use: No   • Sexual activity: Defer         Objective   Physical Exam  Vitals and nursing note reviewed.   Constitutional:       General: He is not in acute distress.     Appearance: Normal appearance. He is not toxic-appearing.   HENT:      Head: Normocephalic and atraumatic.      Right Ear: External ear normal.      Left Ear: External ear normal.      Nose: Nose normal.   Eyes:      General: No scleral icterus.     Conjunctiva/sclera: Conjunctivae normal.   Cardiovascular:      Rate and Rhythm: Normal rate  and regular rhythm.      Heart sounds: Normal heart sounds.   Pulmonary:      Effort: Pulmonary effort is normal.      Breath sounds: Normal breath sounds.   Abdominal:      General: There is no distension.      Palpations: Abdomen is soft.      Tenderness: There is no abdominal tenderness.   Musculoskeletal:         General: Normal range of motion.      Cervical back: Normal range of motion and neck supple.   Skin:     General: Skin is warm and dry.   Neurological:      Mental Status: He is alert and oriented to person, place, and time.   Psychiatric:         Behavior: Behavior normal.      Comments: Anxious.         Procedures         ED Course  ED Course as of Aug 21 1736   Sat Aug 21, 2021   1438 Patient had 324 of aspirin in the ambulance.  He received a nitroglycerin but refused a second 1.  Will place some Nitropaste on his chest wall.  He is extremely anxious and we will treat him with Ativan.  His EKG is nonacute.  I reviewed his old chart however and he has LAD moderate stenosis, this appears to be from cardiac CT.  I could not find evidence of a heart cath and he reports that he has not had a heart cath but that was planned.    [HH]   1735 Serially reevaluated throughout the ED course.  His anxiety improved after his lorazepam.  His difficulty remembering and confusion completely resolved after the Ativan and resolution of anxiety.He is treated with nitroglycerin and Nitropaste with resolution of chest pain.  A dose of Lovenox is pending as wellHeart score is 4I discussed case with Dr. Johnson will admit for definitive inpatient care    [HH]      ED Course User Index  [HH] Philip Cool MD     Recent Results (from the past 24 hour(s))   ECG 12 Lead    Collection Time: 08/21/21  2:13 PM   Result Value Ref Range    QT Interval 418 ms    QTC Interval 424 ms   Troponin    Collection Time: 08/21/21  2:20 PM    Specimen: Blood   Result Value Ref Range    Troponin T <0.010 0.000 - 0.030 ng/mL   Comprehensive  Metabolic Panel    Collection Time: 08/21/21  2:20 PM    Specimen: Blood   Result Value Ref Range    Glucose 140 (H) 65 - 99 mg/dL    BUN 10 8 - 23 mg/dL    Creatinine 1.01 0.76 - 1.27 mg/dL    Sodium 141 136 - 145 mmol/L    Potassium 3.9 3.5 - 5.2 mmol/L    Chloride 106 98 - 107 mmol/L    CO2 19.0 (L) 22.0 - 29.0 mmol/L    Calcium 9.9 8.6 - 10.5 mg/dL    Total Protein 7.1 6.0 - 8.5 g/dL    Albumin 4.30 3.50 - 5.20 g/dL    ALT (SGPT) 31 1 - 41 U/L    AST (SGOT) 22 1 - 40 U/L    Alkaline Phosphatase 85 39 - 117 U/L    Total Bilirubin 0.8 0.0 - 1.2 mg/dL    eGFR Non African Amer 75 >60 mL/min/1.73    Globulin 2.8 gm/dL    A/G Ratio 1.5 g/dL    BUN/Creatinine Ratio 9.9 7.0 - 25.0    Anion Gap 16.0 (H) 5.0 - 15.0 mmol/L   Lipase    Collection Time: 08/21/21  2:20 PM    Specimen: Blood   Result Value Ref Range    Lipase 25 13 - 60 U/L   BNP    Collection Time: 08/21/21  2:20 PM    Specimen: Blood   Result Value Ref Range    proBNP 106.5 0.0 - 900.0 pg/mL   Green Top (Gel)    Collection Time: 08/21/21  2:20 PM   Result Value Ref Range    Extra Tube Hold for add-ons.    Lavender Top    Collection Time: 08/21/21  2:20 PM   Result Value Ref Range    Extra Tube hold for add-on    Gold Top - SST    Collection Time: 08/21/21  2:20 PM   Result Value Ref Range    Extra Tube Hold for add-ons.    CBC Auto Differential    Collection Time: 08/21/21  2:20 PM    Specimen: Blood   Result Value Ref Range    WBC 9.30 3.40 - 10.80 10*3/mm3    RBC 5.04 4.14 - 5.80 10*6/mm3    Hemoglobin 15.3 13.0 - 17.7 g/dL    Hematocrit 43.2 37.5 - 51.0 %    MCV 85.7 79.0 - 97.0 fL    MCH 30.4 26.6 - 33.0 pg    MCHC 35.4 31.5 - 35.7 g/dL    RDW 13.0 12.3 - 15.4 %    RDW-SD 40.4 37.0 - 54.0 fl    MPV 9.8 6.0 - 12.0 fL    Platelets 246 140 - 450 10*3/mm3    Neutrophil % 82.5 (H) 42.7 - 76.0 %    Lymphocyte % 13.2 (L) 19.6 - 45.3 %    Monocyte % 3.7 (L) 5.0 - 12.0 %    Eosinophil % 0.1 (L) 0.3 - 6.2 %    Basophil % 0.2 0.0 - 1.5 %    Immature Grans % 0.3 0.0  "- 0.5 %    Neutrophils, Absolute 7.67 (H) 1.70 - 7.00 10*3/mm3    Lymphocytes, Absolute 1.23 0.70 - 3.10 10*3/mm3    Monocytes, Absolute 0.34 0.10 - 0.90 10*3/mm3    Eosinophils, Absolute 0.01 0.00 - 0.40 10*3/mm3    Basophils, Absolute 0.02 0.00 - 0.20 10*3/mm3    Immature Grans, Absolute 0.03 0.00 - 0.05 10*3/mm3    nRBC 0.0 0.0 - 0.2 /100 WBC   Troponin    Collection Time: 08/21/21  5:04 PM    Specimen: Blood   Result Value Ref Range    Troponin T <0.010 0.000 - 0.030 ng/mL     Note: In addition to lab results from this visit, the labs listed above may include labs taken at another facility or during a different encounter within the last 24 hours. Please correlate lab times with ED admission and discharge times for further clarification of the services performed during this visit.    CT Head Without Contrast   Final Result   No acute intracranial abnormality.           This report was finalized on 8/21/2021 4:35 PM by Kirt Brady.          XR Chest 1 View   Final Result   No evidence of acute disease in the chest.        This report was finalized on 8/21/2021 4:04 PM by Kirt Brady.            Vitals:    08/21/21 1408 08/21/21 1702   BP: 153/95    Pulse: 69    Resp: 22    Temp: 98 °F (36.7 °C)    SpO2: 99%    Weight:  104 kg (230 lb)   Height:  182.9 cm (72\")     Medications   sodium chloride 0.9 % flush 10 mL (has no administration in time range)   enoxaparin (LOVENOX) syringe 100 mg (has no administration in time range)   aspirin chewable tablet 324 mg (324 mg Oral Given by Other 8/21/21 1415)   nitroglycerin (NITROSTAT) ointment 1 inch (1 inch Topical Given 8/21/21 1533)   LORazepam (ATIVAN) injection 0.5 mg (0.5 mg Intravenous Given 8/21/21 1533)     ECG/EMG Results (last 24 hours)     Procedure Component Value Units Date/Time    ECG 12 Lead [[id number]] Collected: 08/21/21 1413     Updated: 08/21/21 1434     QT Interval 418 ms      QTC Interval 424 ms     Narrative:      Test Reason : chest " pain  Blood Pressure :   */*   mmHG  Vent. Rate :  62 BPM     Atrial Rate :  62 BPM     P-R Int : 162 ms          QRS Dur :  88 ms      QT Int : 418 ms       P-R-T Axes :  36   9  44 degrees     QTc Int : 424 ms    Normal sinus rhythm  Normal ECG  When compared with ECG of 12-APR-2021 12:54,  No significant change was found  Confirmed by SANTOS COOL MD ([id number]) on 8/21/2021 2:34:01 PM  Also confirmed by SANTOS COOL MD ([id number])  on 8/21/2021 2:34:42 PM    Referred By: EDMD           Confirmed By: SANTOS COOL MD        ECG 12 Lead         ECG 12 Lead   Final Result   Test Reason : chest pain   Blood Pressure :   */*   mmHG   Vent. Rate :  62 BPM     Atrial Rate :  62 BPM      P-R Int : 162 ms          QRS Dur :  88 ms       QT Int : 418 ms       P-R-T Axes :  36   9  44 degrees      QTc Int : 424 ms      Normal sinus rhythm   Normal ECG   When compared with ECG of 12-APR-2021 12:54,   No significant change was found   Confirmed by SANTOS COOL MD ([id number]) on 8/21/2021 2:34:01 PM   Also confirmed by SANTOS COOL MD (80)  on 8/21/2021 2:34:42 PM      Referred By: EDMD           Confirmed By: SANTOS COOL MD                                                 [facility]    Final diagnoses:   Chest pain, unspecified type   Unstable angina (CMS/HCC)   Anxiety       Documentation assistance provided by moon Mcclain.  Information recorded by the scribe was done at my direction and has been verified and validated by me.     Sky Mcclain  08/21/21 0144       Santos Cool MD  08/21/21 4821

## 2021-08-22 LAB
AMPHET+METHAMPHET UR QL: NEGATIVE
AMPHETAMINES UR QL: NEGATIVE
ANION GAP SERPL CALCULATED.3IONS-SCNC: 8 MMOL/L (ref 5–15)
BACTERIA UR QL AUTO: ABNORMAL /HPF
BARBITURATES UR QL SCN: NEGATIVE
BASOPHILS # BLD AUTO: 0.02 10*3/MM3 (ref 0–0.2)
BASOPHILS NFR BLD AUTO: 0.2 % (ref 0–1.5)
BENZODIAZ UR QL SCN: POSITIVE
BILIRUB UR QL STRIP: NEGATIVE
BUN SERPL-MCNC: 16 MG/DL (ref 8–23)
BUN/CREAT SERPL: 15.2 (ref 7–25)
BUPRENORPHINE SERPL-MCNC: NEGATIVE NG/ML
CALCIUM SPEC-SCNC: 8.8 MG/DL (ref 8.6–10.5)
CANNABINOIDS SERPL QL: POSITIVE
CHLORIDE SERPL-SCNC: 109 MMOL/L (ref 98–107)
CHOLEST SERPL-MCNC: 110 MG/DL (ref 0–200)
CLARITY UR: CLEAR
CO2 SERPL-SCNC: 24 MMOL/L (ref 22–29)
COCAINE UR QL: NEGATIVE
COLOR UR: YELLOW
CREAT SERPL-MCNC: 1.05 MG/DL (ref 0.76–1.27)
DEPRECATED RDW RBC AUTO: 44.2 FL (ref 37–54)
EOSINOPHIL # BLD AUTO: 0.04 10*3/MM3 (ref 0–0.4)
EOSINOPHIL NFR BLD AUTO: 0.4 % (ref 0.3–6.2)
ERYTHROCYTE [DISTWIDTH] IN BLOOD BY AUTOMATED COUNT: 13.2 % (ref 12.3–15.4)
GFR SERPL CREATININE-BSD FRML MDRD: 72 ML/MIN/1.73
GLUCOSE BLDC GLUCOMTR-MCNC: 102 MG/DL (ref 70–130)
GLUCOSE BLDC GLUCOMTR-MCNC: 108 MG/DL (ref 70–130)
GLUCOSE BLDC GLUCOMTR-MCNC: 89 MG/DL (ref 70–130)
GLUCOSE BLDC GLUCOMTR-MCNC: 97 MG/DL (ref 70–130)
GLUCOSE SERPL-MCNC: 109 MG/DL (ref 65–99)
GLUCOSE UR STRIP-MCNC: NEGATIVE MG/DL
HBA1C MFR BLD: 5.5 % (ref 4.8–5.6)
HCT VFR BLD AUTO: 39.3 % (ref 37.5–51)
HDLC SERPL-MCNC: 42 MG/DL (ref 40–60)
HGB BLD-MCNC: 13.3 G/DL (ref 13–17.7)
HGB UR QL STRIP.AUTO: NEGATIVE
HYALINE CASTS UR QL AUTO: ABNORMAL /LPF
IMM GRANULOCYTES # BLD AUTO: 0.04 10*3/MM3 (ref 0–0.05)
IMM GRANULOCYTES NFR BLD AUTO: 0.4 % (ref 0–0.5)
KETONES UR QL STRIP: ABNORMAL
LDLC SERPL CALC-MCNC: 50 MG/DL (ref 0–100)
LDLC/HDLC SERPL: 1.17 {RATIO}
LEUKOCYTE ESTERASE UR QL STRIP.AUTO: ABNORMAL
LYMPHOCYTES # BLD AUTO: 1.99 10*3/MM3 (ref 0.7–3.1)
LYMPHOCYTES NFR BLD AUTO: 17.7 % (ref 19.6–45.3)
MCH RBC QN AUTO: 30.8 PG (ref 26.6–33)
MCHC RBC AUTO-ENTMCNC: 33.8 G/DL (ref 31.5–35.7)
MCV RBC AUTO: 91 FL (ref 79–97)
METHADONE UR QL SCN: NEGATIVE
MONOCYTES # BLD AUTO: 0.65 10*3/MM3 (ref 0.1–0.9)
MONOCYTES NFR BLD AUTO: 5.8 % (ref 5–12)
NEUTROPHILS NFR BLD AUTO: 75.5 % (ref 42.7–76)
NEUTROPHILS NFR BLD AUTO: 8.48 10*3/MM3 (ref 1.7–7)
NITRITE UR QL STRIP: NEGATIVE
NRBC BLD AUTO-RTO: 0 /100 WBC (ref 0–0.2)
OPIATES UR QL: NEGATIVE
OXYCODONE UR QL SCN: NEGATIVE
PCP UR QL SCN: NEGATIVE
PH UR STRIP.AUTO: 8.5 [PH] (ref 5–8)
PLATELET # BLD AUTO: 200 10*3/MM3 (ref 140–450)
PMV BLD AUTO: 10.1 FL (ref 6–12)
POTASSIUM SERPL-SCNC: 4.1 MMOL/L (ref 3.5–5.2)
PROPOXYPH UR QL: NEGATIVE
PROT UR QL STRIP: ABNORMAL
RBC # BLD AUTO: 4.32 10*6/MM3 (ref 4.14–5.8)
RBC # UR: ABNORMAL /HPF
REF LAB TEST METHOD: ABNORMAL
SODIUM SERPL-SCNC: 141 MMOL/L (ref 136–145)
SP GR UR STRIP: 1.03 (ref 1–1.03)
SQUAMOUS #/AREA URNS HPF: ABNORMAL /HPF
TRICYCLICS UR QL SCN: NEGATIVE
TRIGL SERPL-MCNC: 94 MG/DL (ref 0–150)
TSH SERPL DL<=0.05 MIU/L-ACNC: 1.97 UIU/ML (ref 0.27–4.2)
UROBILINOGEN UR QL STRIP: ABNORMAL
VLDLC SERPL-MCNC: 18 MG/DL (ref 5–40)
WBC # BLD AUTO: 11.22 10*3/MM3 (ref 3.4–10.8)
WBC UR QL AUTO: ABNORMAL /HPF

## 2021-08-22 PROCEDURE — 87086 URINE CULTURE/COLONY COUNT: CPT | Performed by: INTERNAL MEDICINE

## 2021-08-22 PROCEDURE — 85025 COMPLETE CBC W/AUTO DIFF WBC: CPT | Performed by: INTERNAL MEDICINE

## 2021-08-22 PROCEDURE — 25010000002 HEPARIN (PORCINE) PER 1000 UNITS: Performed by: INTERNAL MEDICINE

## 2021-08-22 PROCEDURE — 96375 TX/PRO/DX INJ NEW DRUG ADDON: CPT

## 2021-08-22 PROCEDURE — 80306 DRUG TEST PRSMV INSTRMNT: CPT | Performed by: INTERNAL MEDICINE

## 2021-08-22 PROCEDURE — 93005 ELECTROCARDIOGRAM TRACING: CPT | Performed by: EMERGENCY MEDICINE

## 2021-08-22 PROCEDURE — 93010 ELECTROCARDIOGRAM REPORT: CPT | Performed by: INTERNAL MEDICINE

## 2021-08-22 PROCEDURE — 99225 PR SBSQ OBSERVATION CARE/DAY 25 MINUTES: CPT | Performed by: INTERNAL MEDICINE

## 2021-08-22 PROCEDURE — 83036 HEMOGLOBIN GLYCOSYLATED A1C: CPT | Performed by: INTERNAL MEDICINE

## 2021-08-22 PROCEDURE — 96361 HYDRATE IV INFUSION ADD-ON: CPT

## 2021-08-22 PROCEDURE — G0378 HOSPITAL OBSERVATION PER HR: HCPCS

## 2021-08-22 PROCEDURE — 25010000002 MORPHINE PER 10 MG: Performed by: INTERNAL MEDICINE

## 2021-08-22 PROCEDURE — 80061 LIPID PANEL: CPT | Performed by: INTERNAL MEDICINE

## 2021-08-22 PROCEDURE — 99214 OFFICE O/P EST MOD 30 MIN: CPT | Performed by: INTERNAL MEDICINE

## 2021-08-22 PROCEDURE — 84443 ASSAY THYROID STIM HORMONE: CPT | Performed by: INTERNAL MEDICINE

## 2021-08-22 PROCEDURE — 51798 US URINE CAPACITY MEASURE: CPT

## 2021-08-22 PROCEDURE — 96372 THER/PROPH/DIAG INJ SC/IM: CPT

## 2021-08-22 PROCEDURE — 81001 URINALYSIS AUTO W/SCOPE: CPT | Performed by: INTERNAL MEDICINE

## 2021-08-22 PROCEDURE — 93005 ELECTROCARDIOGRAM TRACING: CPT | Performed by: INTERNAL MEDICINE

## 2021-08-22 PROCEDURE — 82962 GLUCOSE BLOOD TEST: CPT

## 2021-08-22 PROCEDURE — 80048 BASIC METABOLIC PNL TOTAL CA: CPT | Performed by: INTERNAL MEDICINE

## 2021-08-22 RX ADMIN — GABAPENTIN 300 MG: 300 CAPSULE ORAL at 09:01

## 2021-08-22 RX ADMIN — MORPHINE SULFATE 1 MG: 2 INJECTION, SOLUTION INTRAMUSCULAR; INTRAVENOUS at 20:27

## 2021-08-22 RX ADMIN — ACETAMINOPHEN 650 MG: 325 TABLET, FILM COATED ORAL at 20:18

## 2021-08-22 RX ADMIN — HEPARIN SODIUM 5000 UNITS: 5000 INJECTION, SOLUTION INTRAVENOUS; SUBCUTANEOUS at 20:18

## 2021-08-22 RX ADMIN — HYDROXYZINE HYDROCHLORIDE 50 MG: 25 TABLET, FILM COATED ORAL at 20:18

## 2021-08-22 RX ADMIN — SODIUM CHLORIDE, PRESERVATIVE FREE 10 ML: 5 INJECTION INTRAVENOUS at 20:27

## 2021-08-22 RX ADMIN — PANTOPRAZOLE SODIUM 40 MG: 40 TABLET, DELAYED RELEASE ORAL at 05:32

## 2021-08-22 RX ADMIN — HEPARIN SODIUM 5000 UNITS: 5000 INJECTION, SOLUTION INTRAVENOUS; SUBCUTANEOUS at 05:32

## 2021-08-22 RX ADMIN — GABAPENTIN 300 MG: 300 CAPSULE ORAL at 20:18

## 2021-08-22 RX ADMIN — SODIUM CHLORIDE, PRESERVATIVE FREE 10 ML: 5 INJECTION INTRAVENOUS at 20:18

## 2021-08-22 RX ADMIN — ISOSORBIDE MONONITRATE 30 MG: 30 TABLET, EXTENDED RELEASE ORAL at 09:01

## 2021-08-22 RX ADMIN — SODIUM CHLORIDE, PRESERVATIVE FREE 10 ML: 5 INJECTION INTRAVENOUS at 08:00

## 2021-08-22 RX ADMIN — HEPARIN SODIUM 5000 UNITS: 5000 INJECTION, SOLUTION INTRAVENOUS; SUBCUTANEOUS at 13:50

## 2021-08-22 RX ADMIN — ASPIRIN 81 MG: 81 TABLET, COATED ORAL at 09:01

## 2021-08-22 RX ADMIN — ATORVASTATIN CALCIUM 20 MG: 20 TABLET, FILM COATED ORAL at 09:01

## 2021-08-22 NOTE — PROGRESS NOTES
[facility] Medicine Services  PROGRESS NOTE    Patient Name: Félix Brian  : 1960  MRN: [id number]    Date of Admission: 2021  Primary Care Physician: Nik Bolaños DO    Subjective   Subjective     CC:  Chest pain    HPI:  Doesn't feel well. Some chest tightness. 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    ROS:  Gen- No fevers, chills  CV- some chest tightness  Resp- No cough, dyspnea  GI- No N/V/D, abd pain         Objective   Objective     Vital Signs:   Temp:  [97.8 °F (36.6 °C)-98.8 °F (37.1 °C)] 98.8 °F (37.1 °C)  Heart Rate:  [54-77] 61  Resp:  [16-22] 16  BP: ()/(47-84) 108/70  Flow (L/min):  [2] 2     Physical Exam:  Constitutional - no acute distress, nontoxic, in bed  HEENT-NCAT, mucous membranes moist  CV-RRR, S1 S2 normal, no m/r/g  Resp-CTAB, no wheezes, rhonchi or rales  Abd-soft, nontender, nondistended, normoactive bowel sounds  Ext-No lower extremity cyanosis, clubbing or edema bilaterally  Neuro-alert and oriented, speech clear, moves all extremities   Psych-slightly anxious affect   Skin- No rash on exposed UE or LE bilaterally      Results Reviewed:  LAB RESULTS:      Lab 21  0445 21  1933 21  1704 21  1420   WBC 11.22*  --   --  9.30   HEMOGLOBIN 13.3  --   --  15.3   HEMATOCRIT 39.3  --   --  43.2   PLATELETS 200  --   --  246   NEUTROS ABS 8.48*  --   --  7.67*   IMMATURE GRANS (ABS) 0.04  --   --  0.03   LYMPHS ABS 1.99  --   --  1.23   MONOS ABS 0.65  --   --  0.34   EOS ABS 0.04  --   --  0.01   MCV 91.0  --   --  85.7   PROCALCITONIN  --   --  0.02  --    LACTATE  --  1.1  --   --    D DIMER QUANT  --  <0.27  --   --          Lab 21  0445 21  1420   SODIUM 141 141   POTASSIUM 4.1 3.9   CHLORIDE 109* 106   CO2 24.0 19.0*   ANION GAP 8.0 16.0*   BUN 16 10   CREATININE 1.05 1.01   GLUCOSE  109* 140*   CALCIUM 8.8 9.9   HEMOGLOBIN A1C 5.50  --    TSH 1.970  --          Lab 08/21/21  1420   TOTAL PROTEIN 7.1   ALBUMIN 4.30   GLOBULIN 2.8   ALT (SGPT) 31   AST (SGOT) 22   BILIRUBIN 0.8   ALK PHOS 85   LIPASE 25         Lab 08/21/21  1933 08/21/21  1704 08/21/21  1420   PROBNP  --   --  106.5   TROPONIN T <0.010 <0.010 <0.010         Lab 08/22/21  0445   CHOLESTEROL 110   LDL CHOL 50   HDL CHOL 42   TRIGLYCERIDES 94             Brief Urine Lab Results  (Last result in the past 365 days)      Color   Clarity   Blood   Leuk Est   Nitrite   Protein   CREAT   Urine HCG        08/22/21 0534 Yellow Clear Negative Trace Negative Trace               Microbiology Results Abnormal     Procedure Component Value - Date/Time    COVID PRE-OP / PRE-PROCEDURE SCREENING ORDER (NO ISOLATION) - Swab, Nasopharynx [[id number]]  (Normal) Collected: 08/21/21 1852    Lab Status: Final result Specimen: Swab from Nasopharynx Updated: 08/21/21 1944    Narrative:      The following orders were created for panel order COVID PRE-OP / PRE-PROCEDURE SCREENING ORDER (NO ISOLATION) - Swab, Nasopharynx.  Procedure                               Abnormality         Status                     ---------                               -----------         ------                     COVID-19,CEPHEID/LIS,LE...[[id number]]  Normal              Final result                 Please view results for these tests on the individual orders.    COVID-19,CEPHEID/LIS,[facility] IN-HOUSE(OR EMERGENT/ADD-ON),NP SWAB IN TRANSPORT MEDIA 3-4 HR TAT - Swab, Nasopharynx [[id number]]  (Normal) Collected: 08/21/21 1852    Lab Status: Final result Specimen: Swab from Nasopharynx Updated: 08/21/21 1944     COVID19 Not Detected    Narrative:      Fact sheet for providers: https://www.fda.gov/media/198982/download     Fact sheet for patients: https://www.fda.gov/media/538160/download  Fact sheet for providers: https://www.fda.gov/media/109871/download     Fact sheet for patients:  https://www.fda.gov/media/556973/download          CT Head Without Contrast    Result Date: 8/21/2021  EXAMINATION: CT HEAD WO CONTRAST-  INDICATION: Mental status change, unknown cause  TECHNIQUE: Axial noncontrast CT of the head with multiplanar reconstruction  The radiation dose reduction device was turned on for each scan per the ALARA (As Low as Reasonably Achievable) protocol.  COMPARISON: NONE  FINDINGS: Gray-white differentiation is maintained and there is no evidence of intracranial hemorrhage, mass or mass effect. The ventricles are normal in size and configuration. The orbits are unremarkable and the paranasal sinuses are grossly clear. The calvarium is intact.      Impression: No acute intracranial abnormality.   This report was finalized on 8/21/2021 4:35 PM by Kirt Brady.      XR Chest 1 View    Result Date: 8/21/2021  EXAMINATION: XR CHEST 1 VW-  INDICATION: Chest Pain triage protocol  COMPARISON: NONE  FINDINGS: No focal airspace opacity. No pleural effusion or pneumothorax. Normal heart and mediastinal contours.      Impression: No evidence of acute disease in the chest.  This report was finalized on 8/21/2021 4:04 PM by Kirt Brady.        Results for orders placed during the hospital encounter of 04/26/21    Adult Transthoracic Echo Complete W/ Cont if Necessary Per Protocol    Interpretation Summary  · Left ventricular ejection fraction appears to be 56 - 60%. Left ventricular systolic function is normal.  · Left ventricular wall thickness is consistent with mild concentric hypertrophy.  · Mild mitral valve regurgitation is present.  · Borderline mild dilation of the ascending aorta is present.      I have reviewed the medications:  Scheduled Meds:amLODIPine, 5 mg, Oral, Daily  aspirin, 81 mg, Oral, Daily  atorvastatin, 20 mg, Oral, Daily  gabapentin, 300 mg, Oral, BID  heparin (porcine), 5,000 Units, Subcutaneous, Q8H  insulin lispro, 0-7 Units, Subcutaneous, TID AC  isosorbide  mononitrate, 30 mg, Oral, Daily  metoprolol succinate XL, 25 mg, Oral, Daily  pantoprazole, 40 mg, Oral, QAM  sodium chloride, 10 mL, Intravenous, Q12H      Continuous Infusions:   PRN Meds:.•  acetaminophen **OR** acetaminophen **OR** acetaminophen  •  dextrose  •  dextrose  •  glucagon (human recombinant)  •  hydrOXYzine  •  Morphine **AND** naloxone  •  ondansetron **OR** ondansetron  •  sodium chloride  •  sodium chloride    Assessment/Plan   Assessment & Plan     Active Hospital Problems    Diagnosis  POA   • **Chest pain [R07.9]  Yes   • Hypertension [I10]  Unknown   • Metabolic acidosis, increased anion gap [E87.2]  Unknown   • Type 2 diabetes mellitus (CMS/HCC) [E11.9]  Unknown   • Palpitations [R00.2]  Yes   • Boston's esophagus without dysplasia [K22.70]  Yes   • Generalized anxiety disorder [F41.1]  Yes      Resolved Hospital Problems   No resolved problems to display.        Brief Hospital Course to date:  Félix Brian is a 61 y.o. male with a PMH significant for Boston's esophagus, nonobstructive CAD, HTN, anxiety who presents to the ED due to complaints of chest pain    Chest pain/rule out Unstable angina  Palpitations  -C am +/- PCI  -outpatient cardiac monitoring     Increase anion gap metabolic acidosis  -resolved     Boston's esophagus  -Continue home PPI     LEONOR  HTN  -Continue home meds     Diabetes mellitus  - a1c 5.5    Anxiety    Leukocytosis  - WBC count 11, afebrile, no infiltrate on CXR, UA rather bland  - cbc am    DVT prophylaxis:  Medical and mechanical DVT prophylaxis orders are present.       AM-PAC 6 Clicks Score (PT): 24 (08/22/21 0901)    Disposition: I expect the patient to be discharged home TBD    CODE STATUS:   Code Status and Medical Interventions:   Ordered at: 08/21/21 1913     Level Of Support Discussed With:    Patient     Code Status:    CPR     Medical Interventions (Level of Support Prior to Arrest):    Full       Janes Rubin MD  08/22/21

## 2021-08-22 NOTE — CONSULTS
[facility]   Consult Note    Referring Provider: Dr. VALERIA Silva    Reason for Consultation: chest pain    Patient is a 61-year-old   male who works as a  and resides in [facility].    Patient Care Team:  Nik Bolaños DO as PCP - General (Internal Medicine)     Problem List:  1. Chest pain  1. 4/26/21 routine GXT exercise time 2:42. Noted exertional chest pain and dyspnea with exercise. No significant EKG changes. Read by Dr. Santos.   2. 4/26/21 ECHO EF 56-60% mild MR. Mild LVH. Borderline dilatation of ascending aorta.  3. 6/21/21 coronary CTA with calcium score of 76. Focal mid LAD calcification, suspected to be mild to moderate.  4. Recurrent progressive disabling chest pain syndrome with associated tachypnea and dyspnea and palpitation with presyncope and [facility] admission with acceptable electrocardiogram, chest x-ray, and troponins, August 2021  2. Hypertension  3. Dyslipidemia  4. Borderline diabetes.    5. GERD  6. Boston esophagus  7. [place]rocution on farm due to transformer issue    8. Arthritis  9. Transient amnesia, 8/21/2021  10. Surgeries: NONE    No Known Allergies      Current Facility-Administered Medications:   •  acetaminophen (TYLENOL) tablet 650 mg, 650 mg, Oral, Q4H PRN **OR** acetaminophen (TYLENOL) 160 MG/5ML solution 650 mg, 650 mg, Oral, Q4H PRN **OR** acetaminophen (TYLENOL) suppository 650 mg, 650 mg, Rectal, Q4H PRN, Atiya Silva G, DO  •  amLODIPine (NORVASC) tablet 5 mg, 5 mg, Oral, Daily, Ricardo, Atiya G, DO, 5 mg at 08/21/21 2017  •  aspirin EC tablet 81 mg, 81 mg, Oral, Daily, Ricardo, Atiya G, DO, 81 mg at 08/22/21 0901  •  atorvastatin (LIPITOR) tablet 20 mg, 20 mg, Oral, Daily, Ricardo, Atiya G, DO, 20 mg at 08/22/21 0901  •  dextrose (D50W) 25 g/ 50mL Intravenous Solution 25 g, 25 g, Intravenous, Q15 Min PRN, RicardoCoreen lidnasie G, DO  •  dextrose (GLUTOSE) oral gel 15 g, 15 g, Oral, Q15 Min PRN, Ricardo,  Atiya G, DO  •  gabapentin (NEURONTIN) capsule 300 mg, 300 mg, Oral, BID, Ricardo, Atiya G, DO, 300 mg at 08/22/21 0901  •  glucagon (human recombinant) (GLUCAGEN DIAGNOSTIC) injection 1 mg, 1 mg, Subcutaneous, Q15 Min PRN, Ricardo, Atiya G, DO  •  heparin (porcine) 5000 UNIT/ML injection 5,000 Units, 5,000 Units, Subcutaneous, Q8H, Ricardo, Atiya G, DO, 5,000 Units at 08/22/21 0532  •  hydrOXYzine (ATARAX) tablet 50 mg, 50 mg, Oral, BID PRN, Ricardo, Atiya G, DO, 50 mg at 08/21/21 2017  •  insulin lispro (humaLOG) injection 0-7 Units, 0-7 Units, Subcutaneous, TID AC, Ricardo, Atiya G, DO  •  isosorbide mononitrate (IMDUR) 24 hr tablet 30 mg, 30 mg, Oral, Daily, Ricardo, Atiya G, DO, 30 mg at 08/22/21 0901  •  metoprolol succinate XL (TOPROL-XL) 24 hr tablet 25 mg, 25 mg, Oral, Daily, Ricardo, Atiya G, DO, 25 mg at 08/21/21 2017  •  morphine injection 1 mg, 1 mg, Intravenous, Q4H PRN **AND** naloxone (NARCAN) injection 0.4 mg, 0.4 mg, Intravenous, Q5 Min PRN, Ricardo, Atiya G, DO  •  ondansetron (ZOFRAN) tablet 4 mg, 4 mg, Oral, Q6H PRN **OR** ondansetron (ZOFRAN) injection 4 mg, 4 mg, Intravenous, Q6H PRN, Ricardo, Atiya G, DO  •  pantoprazole (PROTONIX) EC tablet 40 mg, 40 mg, Oral, Wilfrido ROBERTO Laura, APRN, 40 mg at 08/22/21 0532  •  sodium chloride 0.9 % flush 10 mL, 10 mL, Intravenous, PRN, Ricardo, Atiya G, DO, 10 mL at 08/21/21 1846  •  sodium chloride 0.9 % flush 10 mL, 10 mL, Intravenous, Q12H, Ricardo, Atiya G, DO, 10 mL at 08/22/21 0800  •  sodium chloride 0.9 % flush 10 mL, 10 mL, Intravenous, PRN, Atiya Silva, DO      Medications Prior to Admission   Medication Sig Dispense Refill Last Dose   • amLODIPine (NORVASC) 5 MG tablet Take 1 tablet by mouth Daily. 30 tablet 3 8/20/2021 at Unknown time   • aspirin 81 MG EC tablet Take 1 tablet by mouth Daily. (Patient taking differently: Take 81 mg by mouth Daily. OTC) 30 tablet 3 8/20/2021 at Unknown time   • atorvastatin (LIPITOR) 20 MG tablet Take 1 tablet by mouth  Daily. 30 tablet 2 8/20/2021 at Unknown time   • Cholecalciferol (Vitamin D-3) 125 MCG (5000 UT) tablet Take 1 tablet by mouth Daily. (Patient taking differently: Take 5,000 Units by mouth Daily. OTC) 90 tablet 1 8/20/2021 at Unknown time   • gabapentin (NEURONTIN) 300 MG capsule Take 1 capsule by mouth 2 (Two) Times a Day. (Patient taking differently: Take 300 mg by mouth 2 (Two) Times a Day. For Hand injury (electrocuted)) 60 capsule 5 8/20/2021 at Unknown time   • hydrOXYzine (ATARAX) 25 MG tablet Take 2 tablets by mouth 2 (Two) Times a Day As Needed for Anxiety. 90 tablet 3 8/20/2021 at Unknown time   • meloxicam (MOBIC) 7.5 MG tablet 1 Oral Daily with food. (Patient taking differently: Take 7.5 mg by mouth Daily. With food) 30 tablet 1 8/20/2021 at Unknown time   • metoprolol succinate XL (TOPROL-XL) 25 MG 24 hr tablet Take 1 tablet by mouth Daily. 30 tablet 3 8/20/2021 at Unknown time   • omeprazole (priLOSEC) 40 MG capsule Take 40 mg by mouth Daily. OTC   8/20/2021 at Unknown time   • nitroglycerin (NITROSTAT) 0.4 MG SL tablet 1 under the tongue as needed for angina, may repeat q5mins for up three doses 25 tablet 0 More than a month at Unknown time         Subjective .   History of present illness:    Patient is a 61-year-old  male who is being seen today for further evaluation of chest pain. He has no previous history of obstructive CAD. Throughout this year he has been undergoing evaluation for chest pain. He underwent routine GXT, echo and eventual coronary CTA. CTA suggested some focal mid LAD calcification. It was felt to be mild to moderate by CT. He has since continued to have predominantly exertional chest pain. Yesterday notes that he was at [facility]. He was walking around the facility and began to have severe midsternal chest pressure with associated palpitations, dizziness, shortness of breath. His symptoms progressively worsened and he was brought to the hospital for further  "evaluation. Here his EKG is normal. Troponin is overall normal. States that his discomfort yesterday was \"as close to a heart attack as I have ever felt\". Typically symptoms are brought on with exertion. No reported syncope. Patient was also prescribed an event monitor. However, he was not currently wearing the device whenever he had his episode. Nor has he been wearing his device whenever he has had any other symptoms. He has not yet turned in his monitor. Given severe episode, risk factors, and recent abnormal CTA we were asked to see the patient for further evaluation. Patient also reports that he does not remember being loaded into the ambulance nor arrival to [facility].  He states he has no appetite and continues to note marked fatigue, weakness, and presyncope as well as dyspnea with normal room air oximetry (98%).      Social History     Socioeconomic History   • Marital status:      Spouse name: Not on file   • Number of children: Not on file   • Years of education: Not on file   • Highest education level: Not on file   Tobacco Use   • Smoking status: Never Smoker   • Smokeless tobacco: Never Used   Vaping Use   • Vaping Use: Never used   Substance and Sexual Activity   • Alcohol use: No   • Drug use: Yes     Types: Marijuana   • Sexual activity: Defer     Family History   Problem Relation Age of Onset   • Cancer Mother    • No Known Problems Father    • Diabetes Brother    • Cancer Maternal Grandmother    • Cancer Maternal Grandfather    • Cancer Paternal Grandmother    • Cancer Paternal Grandfather          Review of Systems:  Review of Systems   Constitutional: Positive for malaise/fatigue. Negative for fever.   HENT: Negative for nosebleeds.    Eyes: Negative for redness and visual disturbance.   Cardiovascular: Positive for chest pain, dyspnea on exertion and palpitations. Negative for orthopnea and paroxysmal nocturnal dyspnea.   Respiratory: Positive for shortness of " "breath. Negative for cough, snoring, sputum production and wheezing.    Hematologic/Lymphatic: Negative for bleeding problem.   Skin: Negative for flushing, itching and rash.   Musculoskeletal: Positive for arthritis. Negative for falls, joint pain and muscle cramps.   Gastrointestinal: Positive for heartburn. Negative for abdominal pain, diarrhea, nausea and vomiting.   Genitourinary: Negative for hematuria.   Neurological: Positive for excessive daytime sleepiness. Negative for dizziness, headaches, tremors and weakness.   Psychiatric/Behavioral: Negative for substance abuse. The patient is nervous/anxious.         Objective   Vitals:  /71 (BP Location: Left arm, Patient Position: Lying)   Pulse 54   Temp 98.4 °F (36.9 °C) (Oral)   Resp 16   Ht 182.9 cm (72\")   Wt 105 kg (230 lb 12.8 oz)   SpO2 94%   BMI 31.30 kg/m²      Intake/Output Summary (Last 24 hours) at 8/22/2021 1043  Last data filed at 8/22/2021 0536  Gross per 24 hour   Intake 240 ml   Output 275 ml   Net -35 ml       Vitals reviewed.   Constitutional:       Appearance: Healthy appearance. Well-developed and not in distress.   Neck:      Vascular: No JVD.      Trachea: No tracheal deviation.   Pulmonary:      Effort: Pulmonary effort is normal.      Breath sounds: Normal breath sounds.   Cardiovascular:      Normal rate. Regular rhythm.   Pulses:     Intact distal pulses.   Edema:     Peripheral edema absent.   Abdominal:      General: Bowel sounds are normal.      Palpations: Abdomen is soft.      Tenderness: There is no abdominal tenderness.   Musculoskeletal:         General: No deformity.      Comments: Abrasion and healing hematoma noted to right shin. Skin:     General: Skin is warm and dry.   Neurological:      Mental Status: Alert and oriented to person, place, and time.              Results Review:  I reviewed the patient's new clinical results.  Results from last 7 days   Lab Units 08/22/21  0445   WBC 10*3/mm3 11.22*   HEMOGLOBIN " "g/dL 13.3   HEMATOCRIT % 39.3   PLATELETS 10*3/mm3 200     Results from last 7 days   Lab Units 08/22/21  0445 08/21/21  1420 08/21/21  1420   SODIUM mmol/L 141   < > 141   POTASSIUM mmol/L 4.1   < > 3.9   CHLORIDE mmol/L 109*   < > 106   CO2 mmol/L 24.0   < > 19.0*   BUN mg/dL 16   < > 10   CREATININE mg/dL 1.05   < > 1.01   CALCIUM mg/dL 8.8   < > 9.9   BILIRUBIN mg/dL  --   --  0.8   ALK PHOS U/L  --   --  85   ALT (SGPT) U/L  --   --  31   AST (SGOT) U/L  --   --  22   GLUCOSE mg/dL 109*   < > 140*    < > = values in this interval not displayed.     Results from last 7 days   Lab Units 08/22/21  0445   SODIUM mmol/L 141   POTASSIUM mmol/L 4.1   CHLORIDE mmol/L 109*   CO2 mmol/L 24.0   BUN mg/dL 16   CREATININE mg/dL 1.05   GLUCOSE mg/dL 109*   CALCIUM mg/dL 8.8         Lab Results   Lab Value Date/Time    TROPONINT <0.010 08/21/2021 1933    TROPONINT <0.010 08/21/2021 1704    TROPONINT <0.010 08/21/2021 1420     Results from last 7 days   Lab Units 08/22/21  0445   TSH uIU/mL 1.970     Results from last 7 days   Lab Units 08/22/21  0445   CHOLESTEROL mg/dL 110   TRIGLYCERIDES mg/dL 94   HDL CHOL mg/dL 42   LDL CHOL mg/dL 50     Results from last 7 days   Lab Units 08/21/21  1420   PROBNP pg/mL 106.5     · Tele:  SR WNL    · EKG: SR; no acute changes.    Assessment/Plan     1. Progressive unstable chest pain syndrome suggestive unstable angina pectoris/ACS   2. Coronary artery disease with focal mid LAD stenosis per coronary CTA.  3. Hypertension  4. Dyslipidemia   5. Boston's esophagus.  6. Palpitations. Patient has monitor currently but did not have it on at the time of his most recent episode. Notes that he has had difficulty keeping the patch on as it \"keeps falling off\".    Plan:    1. NPO after midnight except for medications  2. Given persistent exertional symptoms, abnormal coronary CTA, and admission will plan for cardiac catheterization plus or minus catheter-based intervention tomorrow.  Risk and " benefits were discussed with patient.  He verbalizes understanding and wishes to proceed.  3. Continued use of outpatient monitor per Dr. Paula.  4. Dr. Paula to assume cardiac care tomorrow.    MARIANNA Rouse obtained past medical, family history, social history, review of systems and functioned as a scribe for the remainder of the dictation for Dr. Oneill.    I have seen and examined the patient, case was discussed with the physician extender, reviewed the above note, necessary changes were made and I agree with the final note.     Gabo Oneill MD [facility]    Dictated utilizing Dragon dictation

## 2021-08-23 PROBLEM — I20.0 UNSTABLE ANGINA (HCC): Status: ACTIVE | Noted: 2021-08-21

## 2021-08-23 LAB
ANION GAP SERPL CALCULATED.3IONS-SCNC: 10 MMOL/L (ref 5–15)
BACTERIA SPEC AEROBE CULT: NO GROWTH
BASOPHILS # BLD AUTO: 0.02 10*3/MM3 (ref 0–0.2)
BASOPHILS NFR BLD AUTO: 0.2 % (ref 0–1.5)
BUN SERPL-MCNC: 18 MG/DL (ref 8–23)
BUN/CREAT SERPL: 18 (ref 7–25)
CALCIUM SPEC-SCNC: 9.1 MG/DL (ref 8.6–10.5)
CHLORIDE SERPL-SCNC: 109 MMOL/L (ref 98–107)
CO2 SERPL-SCNC: 23 MMOL/L (ref 22–29)
CREAT SERPL-MCNC: 1 MG/DL (ref 0.76–1.27)
DEPRECATED RDW RBC AUTO: 43.8 FL (ref 37–54)
EOSINOPHIL # BLD AUTO: 0.04 10*3/MM3 (ref 0–0.4)
EOSINOPHIL NFR BLD AUTO: 0.5 % (ref 0.3–6.2)
ERYTHROCYTE [DISTWIDTH] IN BLOOD BY AUTOMATED COUNT: 12.9 % (ref 12.3–15.4)
GFR SERPL CREATININE-BSD FRML MDRD: 76 ML/MIN/1.73
GLUCOSE BLDC GLUCOMTR-MCNC: 101 MG/DL (ref 70–130)
GLUCOSE BLDC GLUCOMTR-MCNC: 104 MG/DL (ref 70–130)
GLUCOSE BLDC GLUCOMTR-MCNC: 124 MG/DL (ref 70–130)
GLUCOSE BLDC GLUCOMTR-MCNC: 96 MG/DL (ref 70–130)
GLUCOSE SERPL-MCNC: 95 MG/DL (ref 65–99)
HCT VFR BLD AUTO: 40.3 % (ref 37.5–51)
HGB BLD-MCNC: 13.2 G/DL (ref 13–17.7)
IMM GRANULOCYTES # BLD AUTO: 0.04 10*3/MM3 (ref 0–0.05)
IMM GRANULOCYTES NFR BLD AUTO: 0.5 % (ref 0–0.5)
LYMPHOCYTES # BLD AUTO: 2.4 10*3/MM3 (ref 0.7–3.1)
LYMPHOCYTES NFR BLD AUTO: 27.9 % (ref 19.6–45.3)
MCH RBC QN AUTO: 30.6 PG (ref 26.6–33)
MCHC RBC AUTO-ENTMCNC: 32.8 G/DL (ref 31.5–35.7)
MCV RBC AUTO: 93.3 FL (ref 79–97)
MONOCYTES # BLD AUTO: 0.57 10*3/MM3 (ref 0.1–0.9)
MONOCYTES NFR BLD AUTO: 6.6 % (ref 5–12)
NEUTROPHILS NFR BLD AUTO: 5.54 10*3/MM3 (ref 1.7–7)
NEUTROPHILS NFR BLD AUTO: 64.3 % (ref 42.7–76)
NRBC BLD AUTO-RTO: 0 /100 WBC (ref 0–0.2)
PLATELET # BLD AUTO: 184 10*3/MM3 (ref 140–450)
PMV BLD AUTO: 10.3 FL (ref 6–12)
POTASSIUM SERPL-SCNC: 4.1 MMOL/L (ref 3.5–5.2)
RBC # BLD AUTO: 4.32 10*6/MM3 (ref 4.14–5.8)
SODIUM SERPL-SCNC: 142 MMOL/L (ref 136–145)
WBC # BLD AUTO: 8.61 10*3/MM3 (ref 3.4–10.8)

## 2021-08-23 PROCEDURE — 80048 BASIC METABOLIC PNL TOTAL CA: CPT | Performed by: INTERNAL MEDICINE

## 2021-08-23 PROCEDURE — 93458 L HRT ARTERY/VENTRICLE ANGIO: CPT | Performed by: INTERNAL MEDICINE

## 2021-08-23 PROCEDURE — 85025 COMPLETE CBC W/AUTO DIFF WBC: CPT | Performed by: INTERNAL MEDICINE

## 2021-08-23 PROCEDURE — 25010000002 HEPARIN (PORCINE) PER 1000 UNITS: Performed by: INTERNAL MEDICINE

## 2021-08-23 PROCEDURE — C1760 CLOSURE DEV, VASC: HCPCS | Performed by: INTERNAL MEDICINE

## 2021-08-23 PROCEDURE — G0378 HOSPITAL OBSERVATION PER HR: HCPCS

## 2021-08-23 PROCEDURE — 25010000002 MORPHINE PER 10 MG: Performed by: INTERNAL MEDICINE

## 2021-08-23 PROCEDURE — 96372 THER/PROPH/DIAG INJ SC/IM: CPT

## 2021-08-23 PROCEDURE — C1894 INTRO/SHEATH, NON-LASER: HCPCS | Performed by: INTERNAL MEDICINE

## 2021-08-23 PROCEDURE — 25010000002 MIDAZOLAM PER 1 MG: Performed by: INTERNAL MEDICINE

## 2021-08-23 PROCEDURE — 0 IOPAMIDOL PER 1 ML: Performed by: INTERNAL MEDICINE

## 2021-08-23 PROCEDURE — C1769 GUIDE WIRE: HCPCS | Performed by: INTERNAL MEDICINE

## 2021-08-23 PROCEDURE — 96376 TX/PRO/DX INJ SAME DRUG ADON: CPT

## 2021-08-23 PROCEDURE — 82962 GLUCOSE BLOOD TEST: CPT

## 2021-08-23 PROCEDURE — 25010000002 FENTANYL CITRATE (PF) 50 MCG/ML SOLUTION: Performed by: INTERNAL MEDICINE

## 2021-08-23 PROCEDURE — 99225 PR SBSQ OBSERVATION CARE/DAY 25 MINUTES: CPT | Performed by: NURSE PRACTITIONER

## 2021-08-23 RX ORDER — FENTANYL CITRATE 50 UG/ML
INJECTION, SOLUTION INTRAMUSCULAR; INTRAVENOUS AS NEEDED
Status: DISCONTINUED | OUTPATIENT
Start: 2021-08-23 | End: 2021-08-23 | Stop reason: HOSPADM

## 2021-08-23 RX ORDER — ONDANSETRON 2 MG/ML
4 INJECTION INTRAMUSCULAR; INTRAVENOUS EVERY 6 HOURS PRN
Status: DISCONTINUED | OUTPATIENT
Start: 2021-08-23 | End: 2021-08-25 | Stop reason: HOSPADM

## 2021-08-23 RX ORDER — LIDOCAINE HYDROCHLORIDE 10 MG/ML
INJECTION, SOLUTION EPIDURAL; INFILTRATION; INTRACAUDAL; PERINEURAL AS NEEDED
Status: DISCONTINUED | OUTPATIENT
Start: 2021-08-23 | End: 2021-08-23 | Stop reason: HOSPADM

## 2021-08-23 RX ORDER — SODIUM CHLORIDE 9 MG/ML
1-3 INJECTION, SOLUTION INTRAVENOUS CONTINUOUS
Status: DISCONTINUED | OUTPATIENT
Start: 2021-08-23 | End: 2021-08-25 | Stop reason: HOSPADM

## 2021-08-23 RX ORDER — ACETAMINOPHEN 325 MG/1
650 TABLET ORAL EVERY 4 HOURS PRN
Status: DISCONTINUED | OUTPATIENT
Start: 2021-08-23 | End: 2021-08-25 | Stop reason: HOSPADM

## 2021-08-23 RX ORDER — MIDAZOLAM HYDROCHLORIDE 1 MG/ML
INJECTION INTRAMUSCULAR; INTRAVENOUS AS NEEDED
Status: DISCONTINUED | OUTPATIENT
Start: 2021-08-23 | End: 2021-08-23 | Stop reason: HOSPADM

## 2021-08-23 RX ORDER — NITROGLYCERIN 0.4 MG/1
0.4 TABLET SUBLINGUAL
Status: DISCONTINUED | OUTPATIENT
Start: 2021-08-23 | End: 2021-08-25 | Stop reason: HOSPADM

## 2021-08-23 RX ADMIN — AMLODIPINE BESYLATE 5 MG: 5 TABLET ORAL at 09:01

## 2021-08-23 RX ADMIN — ISOSORBIDE MONONITRATE 30 MG: 30 TABLET, EXTENDED RELEASE ORAL at 09:01

## 2021-08-23 RX ADMIN — GABAPENTIN 300 MG: 300 CAPSULE ORAL at 09:00

## 2021-08-23 RX ADMIN — ATORVASTATIN CALCIUM 20 MG: 20 TABLET, FILM COATED ORAL at 09:01

## 2021-08-23 RX ADMIN — MORPHINE SULFATE 1 MG: 2 INJECTION, SOLUTION INTRAMUSCULAR; INTRAVENOUS at 09:55

## 2021-08-23 RX ADMIN — PANTOPRAZOLE SODIUM 40 MG: 40 TABLET, DELAYED RELEASE ORAL at 09:01

## 2021-08-23 RX ADMIN — HEPARIN SODIUM 5000 UNITS: 5000 INJECTION, SOLUTION INTRAVENOUS; SUBCUTANEOUS at 20:40

## 2021-08-23 RX ADMIN — HEPARIN SODIUM 5000 UNITS: 5000 INJECTION, SOLUTION INTRAVENOUS; SUBCUTANEOUS at 09:01

## 2021-08-23 RX ADMIN — MORPHINE SULFATE 1 MG: 2 INJECTION, SOLUTION INTRAMUSCULAR; INTRAVENOUS at 17:11

## 2021-08-23 RX ADMIN — ACETAMINOPHEN 650 MG: 325 TABLET, FILM COATED ORAL at 17:11

## 2021-08-23 RX ADMIN — SODIUM CHLORIDE, PRESERVATIVE FREE 10 ML: 5 INJECTION INTRAVENOUS at 09:01

## 2021-08-23 RX ADMIN — HYDROXYZINE HYDROCHLORIDE 50 MG: 25 TABLET, FILM COATED ORAL at 12:34

## 2021-08-23 RX ADMIN — METOPROLOL SUCCINATE 25 MG: 25 TABLET, EXTENDED RELEASE ORAL at 09:00

## 2021-08-23 RX ADMIN — ASPIRIN 81 MG: 81 TABLET, COATED ORAL at 09:00

## 2021-08-23 RX ADMIN — SODIUM CHLORIDE, PRESERVATIVE FREE 10 ML: 5 INJECTION INTRAVENOUS at 20:46

## 2021-08-23 RX ADMIN — HYDROXYZINE HYDROCHLORIDE 50 MG: 25 TABLET, FILM COATED ORAL at 20:43

## 2021-08-23 RX ADMIN — GABAPENTIN 300 MG: 300 CAPSULE ORAL at 20:40

## 2021-08-23 RX ADMIN — MORPHINE SULFATE 1 MG: 2 INJECTION, SOLUTION INTRAMUSCULAR; INTRAVENOUS at 00:39

## 2021-08-23 RX ADMIN — MORPHINE SULFATE 1 MG: 2 INJECTION, SOLUTION INTRAMUSCULAR; INTRAVENOUS at 20:43

## 2021-08-23 NOTE — PROGRESS NOTES
[facility] Medicine Services  PROGRESS NOTE    Patient Name: Félix Brian  : 1960  MRN: [id number]    Date of Admission: 2021  Primary Care Physician: Nik Bolaños,     Subjective   Subjective     CC:  Chest pain    HPI:  Very anxious today, awaiting [facility]. Cont to have left sided chest pain that is intermittent, pressure. Reproducible on anterior chest wall. Feels winded with conversation .     ROS:  Gen- No fevers, chills  CV- + chest pain, palpitations  Resp- No cough, + dyspnea  GI- No N/V/D, abd pain          Objective   Objective     Vital Signs:   Temp:  [97.9 °F (36.6 °C)-98.8 °F (37.1 °C)] 98 °F (36.7 °C)  Heart Rate:  [57-77] 77  Resp:  [16-24] 18  BP: (108-140)/(70-84) 135/76  Flow (L/min):  [2] 2     Physical Exam:  Constitutional: No acute distress, awake, alert  HENT: NCAT, mucous membranes moist  Respiratory: Clear to auscultation bilaterally, respiratory effort normal   Cardiovascular: RRR, no murmurs, rubs, or gallops  Gastrointestinal: Positive bowel sounds, soft, nontender, nondistended  Musculoskeletal: No bilateral ankle edema  Psychiatric: Appropriate affect, cooperative  Neurologic: Oriented x 3, strength symmetric in all extremities, Cranial Nerves grossly intact to confrontation, speech clear  Skin: No rashes       Results Reviewed:  LAB RESULTS:      Lab 21  0448 21  0445 21  1933 21  1704 21  1420   WBC 8.61 11.22*  --   --  9.30   HEMOGLOBIN 13.2 13.3  --   --  15.3   HEMATOCRIT 40.3 39.3  --   --  43.2   PLATELETS 184 200  --   --  246   NEUTROS ABS 5.54 8.48*  --   --  7.67*   IMMATURE GRANS (ABS) 0.04 0.04  --   --  0.03   LYMPHS ABS 2.40 1.99  --   --  1.23   MONOS ABS 0.57 0.65  --   --  0.34   EOS ABS 0.04 0.04  --   --  0.01   MCV 93.3 91.0  --   --  85.7   PROCALCITONIN  --   --   --  0.02  --    LACTATE  --   --  1.1  --   --    D DIMER QUANT  --   --  <0.27  --   --          Lab 21  0448  08/22/21  0445 08/21/21  1420   SODIUM 142 141 141   POTASSIUM 4.1 4.1 3.9   CHLORIDE 109* 109* 106   CO2 23.0 24.0 19.0*   ANION GAP 10.0 8.0 16.0*   BUN 18 16 10   CREATININE 1.00 1.05 1.01   GLUCOSE 95 109* 140*   CALCIUM 9.1 8.8 9.9   HEMOGLOBIN A1C  --  5.50  --    TSH  --  1.970  --          Lab 08/21/21  1420   TOTAL PROTEIN 7.1   ALBUMIN 4.30   GLOBULIN 2.8   ALT (SGPT) 31   AST (SGOT) 22   BILIRUBIN 0.8   ALK PHOS 85   LIPASE 25         Lab 08/21/21  1933 08/21/21  1704 08/21/21  1420   PROBNP  --   --  106.5   TROPONIN T <0.010 <0.010 <0.010         Lab 08/22/21 0445   CHOLESTEROL 110   LDL CHOL 50   HDL CHOL 42   TRIGLYCERIDES 94             Brief Urine Lab Results  (Last result in the past 365 days)      Color   Clarity   Blood   Leuk Est   Nitrite   Protein   CREAT   Urine HCG        08/22/21 0534 Yellow Clear Negative Trace Negative Trace               Microbiology Results Abnormal     Procedure Component Value - Date/Time    Urine Culture - Urine, Urine, Clean Catch [[id number]]  (Normal) Collected: 08/22/21 0534    Lab Status: Final result Specimen: Urine, Clean Catch Updated: 08/23/21 1352     Urine Culture No growth    Blood Culture - Blood, Arm, Left [[id number]] Collected: 08/21/21 1933    Lab Status: Preliminary result Specimen: Blood from Arm, Left Updated: 08/22/21 2000     Blood Culture No growth at 24 hours    Blood Culture - Blood, Arm, Right [[id number]] Collected: 08/21/21 1933    Lab Status: Preliminary result Specimen: Blood from Arm, Right Updated: 08/22/21 2000     Blood Culture No growth at 24 hours    COVID PRE-OP / PRE-PROCEDURE SCREENING ORDER (NO ISOLATION) - Swab, Nasopharynx [[id number]]  (Normal) Collected: 08/21/21 1852    Lab Status: Final result Specimen: Swab from Nasopharynx Updated: 08/21/21 1944    Narrative:      The following orders were created for panel order COVID PRE-OP / PRE-PROCEDURE SCREENING ORDER (NO ISOLATION) - Swab, Nasopharynx.  Procedure                                Abnormality         Status                     ---------                               -----------         ------                     COVID-19,CEPHEID/LIS,LE...[[id number]]  Normal              Final result                 Please view results for these tests on the individual orders.    COVID-19,CEPHEID/LIS,[facility] IN-HOUSE(OR EMERGENT/ADD-ON),NP SWAB IN TRANSPORT MEDIA 3-4 HR TAT - Swab, Nasopharynx [[id number]]  (Normal) Collected: 08/21/21 1852    Lab Status: Final result Specimen: Swab from Nasopharynx Updated: 08/21/21 1944     COVID19 Not Detected    Narrative:      Fact sheet for providers: https://www.fda.gov/media/661225/download     Fact sheet for patients: https://www.fda.gov/media/369179/download  Fact sheet for providers: https://www.fda.gov/media/348789/download     Fact sheet for patients: https://www.fda.gov/media/422651/download          CT Head Without Contrast    Result Date: 8/21/2021  EXAMINATION: CT HEAD WO CONTRAST-  INDICATION: Mental status change, unknown cause  TECHNIQUE: Axial noncontrast CT of the head with multiplanar reconstruction  The radiation dose reduction device was turned on for each scan per the ALARA (As Low as Reasonably Achievable) protocol.  COMPARISON: NONE  FINDINGS: Gray-white differentiation is maintained and there is no evidence of intracranial hemorrhage, mass or mass effect. The ventricles are normal in size and configuration. The orbits are unremarkable and the paranasal sinuses are grossly clear. The calvarium is intact.      Impression: No acute intracranial abnormality.   This report was finalized on 8/21/2021 4:35 PM by Kirt Brady.      XR Chest 1 View    Result Date: 8/21/2021  EXAMINATION: XR CHEST 1 VW-  INDICATION: Chest Pain triage protocol  COMPARISON: NONE  FINDINGS: No focal airspace opacity. No pleural effusion or pneumothorax. Normal heart and mediastinal contours.      Impression: No evidence of acute disease in the chest.  This report was  finalized on 8/21/2021 4:04 PM by Kirt Brady.        Results for orders placed during the hospital encounter of 04/26/21    Adult Transthoracic Echo Complete W/ Cont if Necessary Per Protocol    Interpretation Summary  · Left ventricular ejection fraction appears to be 56 - 60%. Left ventricular systolic function is normal.  · Left ventricular wall thickness is consistent with mild concentric hypertrophy.  · Mild mitral valve regurgitation is present.  · Borderline mild dilation of the ascending aorta is present.      I have reviewed the medications:  Scheduled Meds:amLODIPine, 5 mg, Oral, Daily  [MAR Hold] aspirin, 81 mg, Oral, Daily  [MAR Hold] atorvastatin, 20 mg, Oral, Daily  gabapentin, 300 mg, Oral, BID  [MAR Hold] heparin (porcine), 5,000 Units, Subcutaneous, Q8H  [MAR Hold] insulin lispro, 0-7 Units, Subcutaneous, TID AC  [MAR Hold] isosorbide mononitrate, 30 mg, Oral, Daily  metoprolol succinate XL, 25 mg, Oral, Daily  [MAR Hold] pantoprazole, 40 mg, Oral, QAM  [MAR Hold] sodium chloride, 10 mL, Intravenous, Q12H      Continuous Infusions:   PRN Meds:.•  [MAR Hold] acetaminophen **OR** [MAR Hold] acetaminophen **OR** [MAR Hold] acetaminophen  •  [MAR Hold] dextrose  •  [MAR Hold] dextrose  •  fentaNYL citrate (PF)  •  [MAR Hold] glucagon (human recombinant)  •  [MAR Hold] hydrOXYzine  •  lidocaine PF 1%  •  midazolam  •  [MAR Hold] Morphine **AND** [MAR Hold] naloxone  •  [MAR Hold] ondansetron **OR** [MAR Hold] ondansetron  •  [MAR Hold] sodium chloride  •  [MAR Hold] sodium chloride    Assessment/Plan   Assessment & Plan     Active Hospital Problems    Diagnosis  POA   • **Chest pain [R07.9]  Yes   • Unstable angina (CMS/HCC) [I20.0]  Unknown   • Hypertension [I10]  Unknown   • Metabolic acidosis, increased anion gap [E87.2]  Unknown   • Type 2 diabetes mellitus (CMS/HCC) [E11.9]  Unknown   • Palpitations [R00.2]  Yes   • Boston's esophagus without dysplasia [K22.70]  Yes   • Generalized anxiety  disorder [F41.1]  Yes      Resolved Hospital Problems   No resolved problems to display.        Brief Hospital Course to date:  Félix Brian is a 61 y.o. male with a PMH significant for Boston's esophagus, nonobstructive CAD, HTN, anxiety who presents to the ED due to complaints of chest pain    Chest pain/rule out Unstable angina  Palpitations  -[facility] today +/- PCI  -outpatient cardiac monitoring     Increase anion gap metabolic acidosis  -resolved     Boston's esophagus  -Continue home PPI     LEONOR  HTN  -Continue home meds     Diabetes mellitus  - a1c 5.5    Anxiety  --severe anxiety   --cont Atarax, needs to follow up with PCP     Leukocytosis  - WBC count returned to normal, afebrile, no infiltrate on CXR, UA rather bland  - monitor as needed     DVT prophylaxis:  Medical and mechanical DVT prophylaxis orders are present.       AM-PAC 6 Clicks Score (PT): 24 (08/23/21 0901)    Disposition: I expect the patient to be discharged home likely in AM     CODE STATUS:   Code Status and Medical Interventions:   Ordered at: 08/21/21 1913     Level Of Support Discussed With:    Patient     Code Status:    CPR     Medical Interventions (Level of Support Prior to Arrest):    Full       Ashley Guevara, APRN  08/23/21

## 2021-08-24 ENCOUNTER — APPOINTMENT (OUTPATIENT)
Dept: NEUROLOGY | Facility: HOSPITAL | Age: 61
End: 2021-08-24

## 2021-08-24 ENCOUNTER — APPOINTMENT (OUTPATIENT)
Dept: MRI IMAGING | Facility: HOSPITAL | Age: 61
End: 2021-08-24

## 2021-08-24 LAB
ANION GAP SERPL CALCULATED.3IONS-SCNC: 11 MMOL/L (ref 5–15)
BUN SERPL-MCNC: 16 MG/DL (ref 8–23)
BUN/CREAT SERPL: 19.5 (ref 7–25)
CALCIUM SPEC-SCNC: 9 MG/DL (ref 8.6–10.5)
CHLORIDE SERPL-SCNC: 108 MMOL/L (ref 98–107)
CO2 SERPL-SCNC: 22 MMOL/L (ref 22–29)
CREAT SERPL-MCNC: 0.82 MG/DL (ref 0.76–1.27)
FOLATE SERPL-MCNC: 16.8 NG/ML (ref 4.78–24.2)
GFR SERPL CREATININE-BSD FRML MDRD: 96 ML/MIN/1.73
GLUCOSE BLDC GLUCOMTR-MCNC: 103 MG/DL (ref 70–130)
GLUCOSE BLDC GLUCOMTR-MCNC: 114 MG/DL (ref 70–130)
GLUCOSE BLDC GLUCOMTR-MCNC: 124 MG/DL (ref 70–130)
GLUCOSE BLDC GLUCOMTR-MCNC: 185 MG/DL (ref 70–130)
GLUCOSE SERPL-MCNC: 161 MG/DL (ref 65–99)
POTASSIUM SERPL-SCNC: 3.6 MMOL/L (ref 3.5–5.2)
SODIUM SERPL-SCNC: 141 MMOL/L (ref 136–145)
VIT B12 BLD-MCNC: 331 PG/ML (ref 211–946)

## 2021-08-24 PROCEDURE — G0378 HOSPITAL OBSERVATION PER HR: HCPCS

## 2021-08-24 PROCEDURE — 95819 EEG AWAKE AND ASLEEP: CPT

## 2021-08-24 PROCEDURE — 80048 BASIC METABOLIC PNL TOTAL CA: CPT | Performed by: INTERNAL MEDICINE

## 2021-08-24 PROCEDURE — 25010000002 HEPARIN (PORCINE) PER 1000 UNITS: Performed by: INTERNAL MEDICINE

## 2021-08-24 PROCEDURE — 70551 MRI BRAIN STEM W/O DYE: CPT

## 2021-08-24 PROCEDURE — 63710000001 INSULIN LISPRO (HUMAN) PER 5 UNITS: Performed by: INTERNAL MEDICINE

## 2021-08-24 PROCEDURE — 82607 VITAMIN B-12: CPT | Performed by: INTERNAL MEDICINE

## 2021-08-24 PROCEDURE — 82746 ASSAY OF FOLIC ACID SERUM: CPT | Performed by: INTERNAL MEDICINE

## 2021-08-24 PROCEDURE — 63710000001 ONDANSETRON PER 8 MG: Performed by: INTERNAL MEDICINE

## 2021-08-24 PROCEDURE — 82962 GLUCOSE BLOOD TEST: CPT

## 2021-08-24 PROCEDURE — 99225 PR SBSQ OBSERVATION CARE/DAY 25 MINUTES: CPT | Performed by: INTERNAL MEDICINE

## 2021-08-24 PROCEDURE — 25010000002 MORPHINE PER 10 MG: Performed by: INTERNAL MEDICINE

## 2021-08-24 RX ORDER — CHOLECALCIFEROL (VITAMIN D3) 125 MCG
5 CAPSULE ORAL NIGHTLY PRN
Status: DISCONTINUED | OUTPATIENT
Start: 2021-08-24 | End: 2021-08-25 | Stop reason: HOSPADM

## 2021-08-24 RX ADMIN — SODIUM CHLORIDE, PRESERVATIVE FREE 10 ML: 5 INJECTION INTRAVENOUS at 08:57

## 2021-08-24 RX ADMIN — MORPHINE SULFATE 1 MG: 2 INJECTION, SOLUTION INTRAMUSCULAR; INTRAVENOUS at 00:36

## 2021-08-24 RX ADMIN — SODIUM CHLORIDE, PRESERVATIVE FREE 10 ML: 5 INJECTION INTRAVENOUS at 21:56

## 2021-08-24 RX ADMIN — HYDROXYZINE HYDROCHLORIDE 50 MG: 25 TABLET, FILM COATED ORAL at 08:57

## 2021-08-24 RX ADMIN — HEPARIN SODIUM 5000 UNITS: 5000 INJECTION, SOLUTION INTRAVENOUS; SUBCUTANEOUS at 06:03

## 2021-08-24 RX ADMIN — ASPIRIN 81 MG: 81 TABLET, COATED ORAL at 08:57

## 2021-08-24 RX ADMIN — HEPARIN SODIUM 5000 UNITS: 5000 INJECTION, SOLUTION INTRAVENOUS; SUBCUTANEOUS at 21:55

## 2021-08-24 RX ADMIN — ONDANSETRON HYDROCHLORIDE 4 MG: 4 TABLET, FILM COATED ORAL at 14:47

## 2021-08-24 RX ADMIN — MORPHINE SULFATE 1 MG: 2 INJECTION, SOLUTION INTRAMUSCULAR; INTRAVENOUS at 06:03

## 2021-08-24 RX ADMIN — AMLODIPINE BESYLATE 5 MG: 5 TABLET ORAL at 08:57

## 2021-08-24 RX ADMIN — ACETAMINOPHEN 650 MG: 325 TABLET, FILM COATED ORAL at 14:47

## 2021-08-24 RX ADMIN — ATORVASTATIN CALCIUM 20 MG: 20 TABLET, FILM COATED ORAL at 08:57

## 2021-08-24 RX ADMIN — ISOSORBIDE MONONITRATE 30 MG: 30 TABLET, EXTENDED RELEASE ORAL at 08:57

## 2021-08-24 RX ADMIN — HYDROXYZINE HYDROCHLORIDE 50 MG: 25 TABLET, FILM COATED ORAL at 21:55

## 2021-08-24 RX ADMIN — GABAPENTIN 300 MG: 300 CAPSULE ORAL at 08:57

## 2021-08-24 RX ADMIN — HEPARIN SODIUM 5000 UNITS: 5000 INJECTION, SOLUTION INTRAVENOUS; SUBCUTANEOUS at 14:26

## 2021-08-24 RX ADMIN — Medication 5 MG: at 02:40

## 2021-08-24 RX ADMIN — INSULIN LISPRO 2 UNITS: 100 INJECTION, SOLUTION INTRAVENOUS; SUBCUTANEOUS at 13:16

## 2021-08-24 RX ADMIN — METOPROLOL SUCCINATE 25 MG: 25 TABLET, EXTENDED RELEASE ORAL at 08:57

## 2021-08-24 RX ADMIN — PANTOPRAZOLE SODIUM 40 MG: 40 TABLET, DELAYED RELEASE ORAL at 06:03

## 2021-08-24 NOTE — ACP (ADVANCE CARE PLANNING)
Patient requested visit from  to complete Living Will.  Patient has named his sister, Maame Gonzalez, as his primary HCS.  If at end of life, patient would like to continue life prolonging treatment and artifically provided food and/or water.  I faxed a copy of his living will to medical records and placed a copy in his chartlet.  Original and 3 copies were returned to the patient.

## 2021-08-24 NOTE — CASE MANAGEMENT/SOCIAL WORK
Continued Stay Note  [facility]     Patient Name: Félix Brian  MRN: [id number]  Today's Date: 8/24/2021    Admit Date: 8/21/2021    Discharge Plan     Row Name 08/24/21 1253       Plan    Plan  Home    Patient/Family in Agreement with Plan  yes    Plan Comments  Spoke with patient at bedside. He requested to speak to pastoral care regarding an advanced directive. Called pastoral care with a request to see patient. Patient denied further dc needs, family to transport.    Final Discharge Disposition Code  01 - home or self-care        Discharge Codes    No documentation.             Mariana Russo RN

## 2021-08-25 ENCOUNTER — READMISSION MANAGEMENT (OUTPATIENT)
Dept: CALL CENTER | Facility: HOSPITAL | Age: 61
End: 2021-08-25

## 2021-08-25 VITALS
HEIGHT: 72 IN | DIASTOLIC BLOOD PRESSURE: 83 MMHG | SYSTOLIC BLOOD PRESSURE: 137 MMHG | OXYGEN SATURATION: 97 % | RESPIRATION RATE: 18 BRPM | BODY MASS INDEX: 30.23 KG/M2 | TEMPERATURE: 98.4 F | HEART RATE: 73 BPM | WEIGHT: 223.2 LBS

## 2021-08-25 PROBLEM — R07.9 CHEST PAIN: Status: RESOLVED | Noted: 2021-03-30 | Resolved: 2021-08-25

## 2021-08-25 PROBLEM — R00.2 PALPITATIONS: Status: RESOLVED | Noted: 2021-07-26 | Resolved: 2021-08-25

## 2021-08-25 PROBLEM — I20.0 UNSTABLE ANGINA: Status: RESOLVED | Noted: 2021-08-21 | Resolved: 2021-08-25

## 2021-08-25 LAB
GLUCOSE BLDC GLUCOMTR-MCNC: 112 MG/DL (ref 70–130)
GLUCOSE BLDC GLUCOMTR-MCNC: 119 MG/DL (ref 70–130)

## 2021-08-25 PROCEDURE — 82962 GLUCOSE BLOOD TEST: CPT

## 2021-08-25 PROCEDURE — 99217 PR OBSERVATION CARE DISCHARGE MANAGEMENT: CPT | Performed by: FAMILY MEDICINE

## 2021-08-25 PROCEDURE — 25010000002 HEPARIN (PORCINE) PER 1000 UNITS: Performed by: INTERNAL MEDICINE

## 2021-08-25 PROCEDURE — G0378 HOSPITAL OBSERVATION PER HR: HCPCS

## 2021-08-25 RX ORDER — ISOSORBIDE MONONITRATE 30 MG/1
60 TABLET, EXTENDED RELEASE ORAL DAILY
Qty: 30 TABLET | Refills: 11 | Status: SHIPPED | OUTPATIENT
Start: 2021-08-25 | End: 2021-10-27

## 2021-08-25 RX ADMIN — SODIUM CHLORIDE, PRESERVATIVE FREE 10 ML: 5 INJECTION INTRAVENOUS at 09:04

## 2021-08-25 RX ADMIN — METOPROLOL SUCCINATE 25 MG: 25 TABLET, EXTENDED RELEASE ORAL at 09:03

## 2021-08-25 RX ADMIN — ATORVASTATIN CALCIUM 20 MG: 20 TABLET, FILM COATED ORAL at 09:03

## 2021-08-25 RX ADMIN — ISOSORBIDE MONONITRATE 30 MG: 30 TABLET, EXTENDED RELEASE ORAL at 09:03

## 2021-08-25 RX ADMIN — HEPARIN SODIUM 5000 UNITS: 5000 INJECTION, SOLUTION INTRAVENOUS; SUBCUTANEOUS at 05:17

## 2021-08-25 RX ADMIN — PANTOPRAZOLE SODIUM 40 MG: 40 TABLET, DELAYED RELEASE ORAL at 05:17

## 2021-08-25 RX ADMIN — ASPIRIN 81 MG: 81 TABLET, COATED ORAL at 09:03

## 2021-08-25 RX ADMIN — AMLODIPINE BESYLATE 5 MG: 5 TABLET ORAL at 09:03

## 2021-08-25 NOTE — DISCHARGE SUMMARY
[facility] Medicine Services  DISCHARGE SUMMARY    Patient Name: Félix Brian  : 1960  MRN: [id number]    Date of Admission: 2021  2:09 PM  Date of Discharge:  21    Primary Care Physician: Nik Bolaños,     Consults     Date and Time Order Name Status Description    2021  7:50 PM Inpatient Cardiology Consult Completed           Hospital Course     Presenting Problem:   Chest pain [R07.9]    Active Hospital Problems    Diagnosis  POA   • Hypertension [I10]  Yes   • Type 2 diabetes mellitus (CMS/HCC) [E11.9]  Yes   • Boston's esophagus without dysplasia [K22.70]  Yes   • Generalized anxiety disorder [F41.1]  Yes      Resolved Hospital Problems    Diagnosis Date Resolved POA   • **Chest pain [R07.9] 2021 Yes   • Unstable angina (CMS/HCC) [I20.0] 2021 Yes   • Metabolic acidosis, increased anion gap [E87.2] 2021 Yes   • Palpitations [R00.2] 2021 Yes          Hospital Course:  Félix Brian is a 61 y.o. male with a PMH significant for Boston's esophagus, nonobstructive CAD, HTN, anxiety who presents to the ED due to complaints of chest pain     Unstable angina  Palpitations  INIGUEZ sensation  -[facility]  with no obstructing CAD  -increase home Imdur from 30mg to 60mg daily  -outpatient referral made for [facility] for PFT evlauation      Memory difficulty, progressive  MCI  - EEG and MRI brain unremarkable  - TSH, B12, Folate unremarkable  - needs formal memory testing as outpatient, referral made to [facility]     Boston's esophagus  LEONOR  HTN  Diabetes mellitus  Anxiety  - stable/chronic issues  - uncontrolled generalized anxiety may be playing part in INIGUEZ and palpitation sensation        Day of Discharge     HPI:   No current CP.  On RA, breathing comfortably.      Vital Signs:   Temp:  [97.9 °F (36.6 °C)-98.4 °F (36.9 °C)] 98.4 °F (36.9 °C)  Heart Rate:  [60-74] 68  Resp:  [18] 18  BP: (127-137)/(83-86)  137/83     Physical Exam:  Constitutional: No acute distress, awake, alert, nontoxic, normal body habitus  Respiratory: Good effort, nonlabored respirations on RA  Cardiovascular: NSr tele  Musculoskeletal: No peripheral edema, normal muscle tone for age  Psychiatric: Appropriate affect, good insight and judgement, cooperative      Pertinent  and/or Most Recent Results     LAB RESULTS:      Lab 08/23/21  0448 08/22/21  0445 08/21/21  1933 08/21/21  1704 08/21/21  1420   WBC 8.61 11.22*  --   --  9.30   HEMOGLOBIN 13.2 13.3  --   --  15.3   HEMATOCRIT 40.3 39.3  --   --  43.2   PLATELETS 184 200  --   --  246   NEUTROS ABS 5.54 8.48*  --   --  7.67*   IMMATURE GRANS (ABS) 0.04 0.04  --   --  0.03   LYMPHS ABS 2.40 1.99  --   --  1.23   MONOS ABS 0.57 0.65  --   --  0.34   EOS ABS 0.04 0.04  --   --  0.01   MCV 93.3 91.0  --   --  85.7   PROCALCITONIN  --   --   --  0.02  --    LACTATE  --   --  1.1  --   --    D DIMER QUANT  --   --  <0.27  --   --          Lab 08/24/21  1318 08/23/21 0448 08/22/21  0445 08/21/21  1420   SODIUM 141 142 141 141   POTASSIUM 3.6 4.1 4.1 3.9   CHLORIDE 108* 109* 109* 106   CO2 22.0 23.0 24.0 19.0*   ANION GAP 11.0 10.0 8.0 16.0*   BUN 16 18 16 10   CREATININE 0.82 1.00 1.05 1.01   GLUCOSE 161* 95 109* 140*   CALCIUM 9.0 9.1 8.8 9.9   HEMOGLOBIN A1C  --   --  5.50  --    TSH  --   --  1.970  --          Lab 08/21/21  1420   TOTAL PROTEIN 7.1   ALBUMIN 4.30   GLOBULIN 2.8   ALT (SGPT) 31   AST (SGOT) 22   BILIRUBIN 0.8   ALK PHOS 85   LIPASE 25         Lab 08/21/21 1933 08/21/21  1704 08/21/21  1420   PROBNP  --   --  106.5   TROPONIN T <0.010 <0.010 <0.010         Lab 08/22/21  0445   CHOLESTEROL 110   LDL CHOL 50   HDL CHOL 42   TRIGLYCERIDES 94         Lab 08/24/21  1318   FOLATE 16.80   VITAMIN B 12 331         Brief Urine Lab Results  (Last result in the past 365 days)      Color   Clarity   Blood   Leuk Est   Nitrite   Protein   CREAT   Urine HCG        08/22/21 0534 Yellow Clear  Negative Trace Negative Trace             Microbiology Results (last 10 days)     Procedure Component Value - Date/Time    Urine Culture - Urine, Urine, Clean Catch [[id number]]  (Normal) Collected: 08/22/21 0534    Lab Status: Final result Specimen: Urine, Clean Catch Updated: 08/23/21 1352     Urine Culture No growth    Blood Culture - Blood, Arm, Left [[id number]] Collected: 08/21/21 1933    Lab Status: Preliminary result Specimen: Blood from Arm, Left Updated: 08/24/21 2000     Blood Culture No growth at 3 days    Blood Culture - Blood, Arm, Right [[id number]] Collected: 08/21/21 1933    Lab Status: Preliminary result Specimen: Blood from Arm, Right Updated: 08/24/21 2000     Blood Culture No growth at 3 days    COVID PRE-OP / PRE-PROCEDURE SCREENING ORDER (NO ISOLATION) - Swab, Nasopharynx [[id number]]  (Normal) Collected: 08/21/21 1852    Lab Status: Final result Specimen: Swab from Nasopharynx Updated: 08/21/21 1944    Narrative:      The following orders were created for panel order COVID PRE-OP / PRE-PROCEDURE SCREENING ORDER (NO ISOLATION) - Swab, Nasopharynx.  Procedure                               Abnormality         Status                     ---------                               -----------         ------                     COVID-19,CEPHEID/LIS,LE...[[id number]]  Normal              Final result                 Please view results for these tests on the individual orders.    COVID-19,CEPHEID/LIS,[facility] IN-HOUSE(OR EMERGENT/ADD-ON),NP SWAB IN TRANSPORT MEDIA 3-4 HR TAT - Swab, Nasopharynx [[id number]]  (Normal) Collected: 08/21/21 1852    Lab Status: Final result Specimen: Swab from Nasopharynx Updated: 08/21/21 1944     COVID19 Not Detected    Narrative:      Fact sheet for providers: https://www.fda.gov/media/165158/download     Fact sheet for patients: https://www.fda.gov/media/383253/download  Fact sheet for providers: https://www.fda.gov/media/117159/download     Fact sheet for patients:  https://www.fda.gov/media/051739/download          EEG    Result Date: 8/24/2021  Reason for referral: 61 y.o.male altered mental status Technical Summary:  A 19 channel digital EEG was performed using the international 10-20 placement system, including eye leads and EKG leads. Duration: 20 minutes Video: On Findings: The awake tracing shows a medium amplitude well-regulated 10 Hz posterior rhythm present symmetrically over the occipital leads.  Medium amplitude theta and alpha activity with an overlay of EMG artifact is present anteriorly.  Light sleep is seen with mild slowing of the background and vertex waves.  Photic stimulation does not elicit abnormality.  Hyperventilation is not performed.  No focal slowing or epileptiform activity is seen. EKG: Regular, 60-70 bpm     Normal EEG in the awake and lightly asleep states No epileptiform activity is seen This report is transcribed using the Dragon dictation system.      CT Head Without Contrast    Result Date: 8/21/2021  EXAMINATION: CT HEAD WO CONTRAST-  INDICATION: Mental status change, unknown cause  TECHNIQUE: Axial noncontrast CT of the head with multiplanar reconstruction  The radiation dose reduction device was turned on for each scan per the ALARA (As Low as Reasonably Achievable) protocol.  COMPARISON: NONE  FINDINGS: Gray-white differentiation is maintained and there is no evidence of intracranial hemorrhage, mass or mass effect. The ventricles are normal in size and configuration. The orbits are unremarkable and the paranasal sinuses are grossly clear. The calvarium is intact.      No acute intracranial abnormality.   This report was finalized on 8/21/2021 4:35 PM by Kirt Brady.      MRI Brain Without Contrast    Result Date: 8/25/2021  EXAMINATION: MRI BRAIN WO CONTRAST-08/24/2021:  INDICATION: Memory Loss; R07.9-Chest pain, unspecified; I20.0-Unstable angina; F41.9-Anxiety disorder, unspecified.  TECHNIQUE: Multiplanar MRI of the brain without  intravenous contrast.  COMPARISON: NONE.  FINDINGS: No restriction on diffusion-weighted sequences. The midline structures are symmetric without evidence of mass, mass effect, or midline shift. Ventricles and sulci within normal limits. Mild-to-moderate increased signal findings on T2 and FLAIR suggesting chronic small vessel ischemic disease. No susceptibility artifact on susceptibility-weighted imaging. No cerebellopontine angle mass lesion with normal signal flow voids of the distal internal carotid and vertebrobasilar arteries. Globes and orbits retain normal T2 signal characteristics. The paranasal sinuses and mastoid air cells are grossly clear and well pneumatized. Pituitary and sella within normal limits. Cervicomedullary junction widely patent.      1. No acute infarction.  2. No focal or generalized atrophy, however, mild-to-moderate chronic small vessel ischemic disease noted in the supratentorial structures.  D:  08/24/2021 E:  08/25/2021         Cardiac Catheterization/Vascular Study    Result Date: 8/23/2021  --------------------------------------------------------------------------- ------------------------------------------ Referring Provider: Govind Paula M.D. Procedures Performed: Left heart catheterization with left ventriculography and bilateral selective coronary angiography.  Closure right common femoral artery using a six Prydeinig Angio-Seal device. Indications: The patient is a 61-year-old man admitted to hospital with symptoms consistent with a marker negative acute coronary syndrome.  He is referred to me this afternoon for cardiac catheterization studies with intervention standby. Procedure Narrative: Informed consent was obtained.  The patient was transported to the [facility] catheterization laboratories in the postabsorptive state.  Conscious sedation was instituted utilizing protocol driven fentanyl and Versed.  Local anesthesia over the right common femoral artery was instituted  utilizing local infiltration of 1% lidocaine.  Utilizing radiographic guidance and the percutaneous technique of Seldinger, I placed a six Ghanaian sheath into the right common femoral artery.  The sheath was used to pass a six Ghanaian pigtail catheter into the aorta and across the aortic valve while pressures were being recorded.  A single-plane 30 degree TRINH left ventriculogram was then carried out using 15 cc of contrast per second for 3 seconds.  Pullback pressure was recorded across the aortic valve.  Subsequent to this, the pigtail catheter was exchanged sequentially for 6 Ghanaian left and right Brisa coronary catheters.  Bilateral selective multiplane coronary angiography was carried out.  Following the review of images, all catheters were removed and the procedure terminated.  After the review of an acceptable vascular access site angiogram,  I closed the right common femoral artery using a six Ghanaian Angio-Seal device.  There were no complications to this procedure.  The patient was subsequently returned to the cardiovascular observation unit for recovery. Complications:  There were no signs of early complications. Hemodynamic Findings: · AO pressure: 100/60 mmHg · LVpressure: 100/15 mmHg Angiographic Findings: Left Ventriculography:  The left ventricle was filmed in a single plane, 30' TRINH projection.  I cannot identify any global or segmental wall motion abnormalities.  The estimated left ventricular ejection fraction is 70%.  There is no evidence of mitral regurgitation.  There is no evidence of mitral valve prolapse.  This is a normal left ventriculogram. Selective Coronary Angiography: · LM: Normal.  · LAD: The vessel gives rise to a large diagonal artery proximally.  Scattered trivial plaque is noted in the LAD and its branches.  Obstructive disease is not seen. · LCX: Nondominant.  The vessel gives rise to two marginal arteries over the posterior left ventricular wall.  Scattered trivial plaque is  noted in this vessel.  Obstructive disease is not seen. · RCA: This is a dominant vessel it gives rise to posterior descending and posterolateral branches.  Scattered trivial plaque is noted.  Obstructive disease is not seen. Final Impression: #1: Normal left ventriculogram.  The estimated left ventricular ejection fraction is 70%.                            #2:  Minor nonobstructive coronary disease is seen.     XR Chest 1 View    Result Date: 8/21/2021  EXAMINATION: XR CHEST 1 VW-  INDICATION: Chest Pain triage protocol  COMPARISON: NONE  FINDINGS: No focal airspace opacity. No pleural effusion or pneumothorax. Normal heart and mediastinal contours.      No evidence of acute disease in the chest.  This report was finalized on 8/21/2021 4:04 PM by Kirt Brady.                Results for orders placed during the hospital encounter of 04/26/21    Adult Transthoracic Echo Complete W/ Cont if Necessary Per Protocol    Interpretation Summary  · Left ventricular ejection fraction appears to be 56 - 60%. Left ventricular systolic function is normal.  · Left ventricular wall thickness is consistent with mild concentric hypertrophy.  · Mild mitral valve regurgitation is present.  · Borderline mild dilation of the ascending aorta is present.      Plan for Follow-up of Pending Labs/Results: Dr. Jj  Pending Labs     Order Current Status    Blood Culture - Blood, Arm, Left Preliminary result    Blood Culture - Blood, Arm, Right Preliminary result        Discharge Details        Discharge Medications      Changes to Medications      Instructions Start Date   isosorbide mononitrate 30 MG 24 hr tablet  Commonly known as: IMDUR  What changed: how much to take   60 mg, Oral, Daily         Continue These Medications      Instructions Start Date   amLODIPine 5 MG tablet  Commonly known as: NORVASC   5 mg, Oral, Daily      aspirin 81 MG EC tablet   81 mg, Oral, Daily      atorvastatin 20 MG tablet  Commonly known as: LIPITOR    20 mg, Oral, Daily      gabapentin 300 MG capsule  Commonly known as: NEURONTIN   300 mg, Oral, 2 Times Daily      hydrOXYzine 25 MG tablet  Commonly known as: ATARAX   50 mg, Oral, 2 Times Daily PRN      meloxicam 7.5 MG tablet  Commonly known as: MOBIC   1 Oral Daily with food.      metoprolol succinate XL 25 MG 24 hr tablet  Commonly known as: TOPROL-XL   25 mg, Oral, Daily      nitroglycerin 0.4 MG SL tablet  Commonly known as: NITROSTAT   1 under the tongue as needed for angina, may repeat q5mins for up three doses      omeprazole 40 MG capsule  Commonly known as: priLOSEC   40 mg, Oral, Daily, OTC      Vitamin D-3 125 MCG (5000 UT) tablet   5,000 Units, Oral, Daily             No Known Allergies      Discharge Disposition:  Home or Self Care    Diet:  Hospital:  Diet Order   Procedures   • Diet Regular     CODE STATUS:    Code Status and Medical Interventions:   Ordered at: 08/23/21 1906     Level Of Support Discussed With:    Patient     Code Status:    CPR     Medical Interventions (Level of Support Prior to Arrest):    Full       Future Appointments   Date Time Provider Department Center   9/28/2021 11:30 AM Surendra Nicole MD [facility]   10/30/2021 11:00 AM Nik Bolaños DO [facility]       Additional Instructions for the Follow-ups that You Need to Schedule     Discharge Follow-up with PCP   As directed       Currently Documented PCP:    Nik Bolaños DO    PCP Phone Number:    [phone]     Follow Up Details: within 2 weeks of discharge for Transtion of Care                     Alejandra Jj MD  08/25/21      Time Spent on Discharge:  I spent  40  minutes on this discharge activity which included: face-to-face encounter with the patient, reviewing the data in the system, coordination of the care with the nursing staff as well as consultants, documentation, and entering orders.

## 2021-08-25 NOTE — OUTREACH NOTE
Prep Survey      Responses   [facility] facility patient discharged from?  [place]   Is LACE score < 7 ?  Yes   Emergency Room discharge w/ pulse ox?  No   Eligibility  TCM   [facility]   Date of Admission  08/21/21   Date of Discharge  08/25/21   Discharge Disposition  Home or Self Care   Discharge diagnosis  Chest pain-left heart cath this visit   Does the patient have one of the following disease processes/diagnoses(primary or secondary)?  Other   Does the patient have Home health ordered?  No   Is there a DME ordered?  No   Prep survey completed?  Yes          Ashley Hines RN

## 2021-08-25 NOTE — CASE MANAGEMENT/SOCIAL WORK
Continued Stay Note  [facility]     Patient Name: Félix Brian  MRN: [id number]  Today's Date: 8/25/2021    Admit Date: 8/21/2021    Discharge Plan     Row Name 08/25/21 1442       Plan    Plan  Home    Patient/Family in Agreement with Plan  yes    Plan Comments  Spoke with patient at bedside. He denied dc needs, his friend will transport.    Final Discharge Disposition Code  01 - home or self-care        Discharge Codes    No documentation.       Expected Discharge Date and Time     Expected Discharge Date Expected Discharge Time    Aug 25, 2021             Mariana Russo RN

## 2021-08-25 NOTE — PROGRESS NOTES
[facility] Medicine Services  PROGRESS NOTE    Patient Name: Félix Brian  : 1960  MRN: [id number]    Date of Admission: 2021  Primary Care Physician: Nik Bolaños DO    Subjective   Subjective     CC:  Chest pain    HPI:  Has some left chest pain still that he says is reproducible with palpation. Worried about his memory lately, has been forgetting thinks. Talked about a dead uncle the other day as if he was alive    ROS:  Gen- No fevers, chills  CV- No chest pain, palpitations  Resp- No cough, dyspnea  GI- No N/V/D, abd pain             Objective   Objective     Vital Signs:   Temp:  [98.1 °F (36.7 °C)-98.5 °F (36.9 °C)] 98.1 °F (36.7 °C)  Heart Rate:  [54-77] 63  Resp:  [18] 18  BP: (123-127)/(82-86) 127/83     Physical Exam:  Constitutional - no acute distress, nontoxic, in bed  HEENT-NCAT, mucous membranes moist  CV-RRR, S1 S2 normal, no m/r/g  Resp-CTAB, no wheezes, rhonchi or rales  MS- left chest wall tenderness to palpation  Abd-soft, nontender, nondistended, normoactive bowel sounds  Ext-No lower extremity cyanosis, clubbing or edema bilaterally  Neuro-alert and oriented, knows year, month, day, president, remembers three objects at 5 minutes, face and smile symmetric, EOMI, face symmetric,  5/5 bilaterally, finger to nose accurate, no clonus, speech clear, moves all extremities   Psych-slightly anxious affect   Skin- No rash on exposed UE or LE bilaterally        Results Reviewed:  LAB RESULTS:      Lab 21  0448 21  0445 21  1933 21  1704 21  1420   WBC 8.61 11.22*  --   --  9.30   HEMOGLOBIN 13.2 13.3  --   --  15.3   HEMATOCRIT 40.3 39.3  --   --  43.2   PLATELETS 184 200  --   --  246   NEUTROS ABS 5.54 8.48*  --   --  7.67*   IMMATURE GRANS (ABS) 0.04 0.04  --   --  0.03   LYMPHS ABS 2.40 1.99  --   --  1.23   MONOS ABS 0.57 0.65  --   --  0.34   EOS ABS 0.04 0.04  --   --  0.01   MCV 93.3 91.0  --   --  85.7    PROCALCITONIN  --   --   --  0.02  --    LACTATE  --   --  1.1  --   --    D DIMER QUANT  --   --  <0.27  --   --          Lab 08/24/21  1318 08/23/21  0448 08/22/21  0445 08/21/21  1420   SODIUM 141 142 141 141   POTASSIUM 3.6 4.1 4.1 3.9   CHLORIDE 108* 109* 109* 106   CO2 22.0 23.0 24.0 19.0*   ANION GAP 11.0 10.0 8.0 16.0*   BUN 16 18 16 10   CREATININE 0.82 1.00 1.05 1.01   GLUCOSE 161* 95 109* 140*   CALCIUM 9.0 9.1 8.8 9.9   HEMOGLOBIN A1C  --   --  5.50  --    TSH  --   --  1.970  --          Lab 08/21/21  1420   TOTAL PROTEIN 7.1   ALBUMIN 4.30   GLOBULIN 2.8   ALT (SGPT) 31   AST (SGOT) 22   BILIRUBIN 0.8   ALK PHOS 85   LIPASE 25         Lab 08/21/21  1933 08/21/21  1704 08/21/21  1420   PROBNP  --   --  106.5   TROPONIN T <0.010 <0.010 <0.010         Lab 08/22/21  0445   CHOLESTEROL 110   LDL CHOL 50   HDL CHOL 42   TRIGLYCERIDES 94         Lab 08/24/21  1318   FOLATE 16.80   VITAMIN B 12 331         Brief Urine Lab Results  (Last result in the past 365 days)      Color   Clarity   Blood   Leuk Est   Nitrite   Protein   CREAT   Urine HCG        08/22/21 0534 Yellow Clear Negative Trace Negative Trace               Microbiology Results Abnormal     Procedure Component Value - Date/Time    Blood Culture - Blood, Arm, Left [[id number]] Collected: 08/21/21 1933    Lab Status: Preliminary result Specimen: Blood from Arm, Left Updated: 08/24/21 2000     Blood Culture No growth at 3 days    Blood Culture - Blood, Arm, Right [[id number]] Collected: 08/21/21 1933    Lab Status: Preliminary result Specimen: Blood from Arm, Right Updated: 08/24/21 2000     Blood Culture No growth at 3 days    Urine Culture - Urine, Urine, Clean Catch [[id number]]  (Normal) Collected: 08/22/21 0534    Lab Status: Final result Specimen: Urine, Clean Catch Updated: 08/23/21 1352     Urine Culture No growth    COVID PRE-OP / PRE-PROCEDURE SCREENING ORDER (NO ISOLATION) - Swab, Nasopharynx [[id number]]  (Normal) Collected: 08/21/21 1369     Lab Status: Final result Specimen: Swab from Nasopharynx Updated: 08/21/21 1944    Narrative:      The following orders were created for panel order COVID PRE-OP / PRE-PROCEDURE SCREENING ORDER (NO ISOLATION) - Swab, Nasopharynx.  Procedure                               Abnormality         Status                     ---------                               -----------         ------                     COVID-19,CEPHEID/LIS,LE...[[id number]]  Normal              Final result                 Please view results for these tests on the individual orders.    COVID-19,CEPHEID/LIS,[facility] IN-HOUSE(OR EMERGENT/ADD-ON),NP SWAB IN TRANSPORT MEDIA 3-4 HR TAT - Swab, Nasopharynx [[id number]]  (Normal) Collected: 08/21/21 1852    Lab Status: Final result Specimen: Swab from Nasopharynx Updated: 08/21/21 1944     COVID19 Not Detected    Narrative:      Fact sheet for providers: https://www.fda.gov/media/283458/download     Fact sheet for patients: https://www.fda.gov/media/144020/download  Fact sheet for providers: https://www.fda.gov/media/932237/download     Fact sheet for patients: https://www.fda.gov/media/153201/download          EEG    Result Date: 8/24/2021  Reason for referral: 61 y.o.male altered mental status Technical Summary:  A 19 channel digital EEG was performed using the international 10-20 placement system, including eye leads and EKG leads. Duration: 20 minutes Video: On Findings: The awake tracing shows a medium amplitude well-regulated 10 Hz posterior rhythm present symmetrically over the occipital leads.  Medium amplitude theta and alpha activity with an overlay of EMG artifact is present anteriorly.  Light sleep is seen with mild slowing of the background and vertex waves.  Photic stimulation does not elicit abnormality.  Hyperventilation is not performed.  No focal slowing or epileptiform activity is seen. EKG: Regular, 60-70 bpm     Impression: Normal EEG in the awake and lightly asleep states No  epileptiform activity is seen This report is transcribed using the Dragon dictation system.      MRI Brain Without Contrast    Result Date: 8/24/2021  EXAMINATION: MRI BRAIN WO CONTRAST-  INDICATION: Memory Loss; R07.9-Chest pain, unspecified; I20.0-Unstable angina; F41.9-Anxiety disorder, unspecified  TECHNIQUE: Multiplanar of the brain without intravenous contrast  COMPARISON: NONE  FINDINGS: No restriction on diffusion-weighted sequences. Midline structures are symmetric without evidence of mass, mass effect or midline shift. Ventricles and sulci within normal limits. Mild to moderate increased signal findings on T2 and FLAIR suggesting chronic small vessel ischemic disease. No susceptibility artifact on susceptibility imaging. No cerebellopontine angle mass lesion with normal signal flow voids of the distal internal carotid and vertebrobasilar arteries. Globes and orbits retain normal T2 signal characteristics. Paranasal sinuses and mastoid cells are grossly clear well-pneumatized. Pituitary and sella within normal limits. Cervicomedullary junction widely patent       Impression: 1. No acute infarction 2. No focal or generalized atrophy however mild to moderate chronic small vessel ischemic disease noted in the supratentorial structures        Cardiac Catheterization/Vascular Study    Result Date: 8/23/2021  --------------------------------------------------------------------------- ------------------------------------------ Referring Provider: Govind Paula M.D. Procedures Performed: Left heart catheterization with left ventriculography and bilateral selective coronary angiography.  Closure right common femoral artery using a six Armenian Angio-Seal device. Indications: The patient is a 61-year-old man admitted to hospital with symptoms consistent with a marker negative acute coronary syndrome.  He is referred to me this afternoon for cardiac catheterization studies with intervention standby. Procedure Narrative:  Informed consent was obtained.  The patient was transported to the [facility] catheterization laboratories in the postabsorptive state.  Conscious sedation was instituted utilizing protocol driven fentanyl and Versed.  Local anesthesia over the right common femoral artery was instituted utilizing local infiltration of 1% lidocaine.  Utilizing radiographic guidance and the percutaneous technique of Seldinger, I placed a six Filipino sheath into the right common femoral artery.  The sheath was used to pass a six Filipino pigtail catheter into the aorta and across the aortic valve while pressures were being recorded.  A single-plane 30 degree TRINH left ventriculogram was then carried out using 15 cc of contrast per second for 3 seconds.  Pullback pressure was recorded across the aortic valve.  Subsequent to this, the pigtail catheter was exchanged sequentially for 6 Filipino left and right Brisa coronary catheters.  Bilateral selective multiplane coronary angiography was carried out.  Following the review of images, all catheters were removed and the procedure terminated.  After the review of an acceptable vascular access site angiogram,  I closed the right common femoral artery using a six Filipino Angio-Seal device.  There were no complications to this procedure.  The patient was subsequently returned to the cardiovascular observation unit for recovery. Complications:  There were no signs of early complications. Hemodynamic Findings: · AO pressure: 100/60 mmHg · LVpressure: 100/15 mmHg Angiographic Findings: Left Ventriculography:  The left ventricle was filmed in a single plane, 30' TRINH projection.  I cannot identify any global or segmental wall motion abnormalities.  The estimated left ventricular ejection fraction is 70%.  There is no evidence of mitral regurgitation.  There is no evidence of mitral valve prolapse.  This is a normal left ventriculogram. Selective Coronary Angiography: · LM: Normal.  · LAD: The vessel  gives rise to a large diagonal artery proximally.  Scattered trivial plaque is noted in the LAD and its branches.  Obstructive disease is not seen. · LCX: Nondominant.  The vessel gives rise to two marginal arteries over the posterior left ventricular wall.  Scattered trivial plaque is noted in this vessel.  Obstructive disease is not seen. · RCA: This is a dominant vessel it gives rise to posterior descending and posterolateral branches.  Scattered trivial plaque is noted.  Obstructive disease is not seen. Final Impression: #1: Normal left ventriculogram.  The estimated left ventricular ejection fraction is 70%.                            #2:  Minor nonobstructive coronary disease is seen.       Results for orders placed during the hospital encounter of 04/26/21    Adult Transthoracic Echo Complete W/ Cont if Necessary Per Protocol    Interpretation Summary  · Left ventricular ejection fraction appears to be 56 - 60%. Left ventricular systolic function is normal.  · Left ventricular wall thickness is consistent with mild concentric hypertrophy.  · Mild mitral valve regurgitation is present.  · Borderline mild dilation of the ascending aorta is present.      I have reviewed the medications:  Scheduled Meds:amLODIPine, 5 mg, Oral, Daily  aspirin, 81 mg, Oral, Daily  atorvastatin, 20 mg, Oral, Daily  heparin (porcine), 5,000 Units, Subcutaneous, Q8H  insulin lispro, 0-7 Units, Subcutaneous, TID AC  isosorbide mononitrate, 30 mg, Oral, Daily  metoprolol succinate XL, 25 mg, Oral, Daily  pantoprazole, 40 mg, Oral, QAM  sodium chloride, 10 mL, Intravenous, Q12H      Continuous Infusions:sodium chloride, 1-3 mL/kg/hr, Last Rate: Stopped (08/24/21 0742)      PRN Meds:.•  acetaminophen **OR** acetaminophen **OR** acetaminophen  •  acetaminophen  •  dextrose  •  dextrose  •  glucagon (human recombinant)  •  hydrOXYzine  •  melatonin  •  [DISCONTINUED] Morphine **AND** naloxone  •  nitroglycerin  •  ondansetron **OR**  ondansetron  •  ondansetron  •  sodium chloride  •  sodium chloride    Assessment/Plan   Assessment & Plan     Active Hospital Problems    Diagnosis  POA   • **Chest pain [R07.9]  Yes   • Unstable angina (CMS/HCC) [I20.0]  Unknown   • Hypertension [I10]  Unknown   • Metabolic acidosis, increased anion gap [E87.2]  Unknown   • Type 2 diabetes mellitus (CMS/HCC) [E11.9]  Unknown   • Palpitations [R00.2]  Yes   • Boston's esophagus without dysplasia [K22.70]  Yes   • Generalized anxiety disorder [F41.1]  Yes      Resolved Hospital Problems   No resolved problems to display.        Brief Hospital Course to date:  Félix Brian is a 61 y.o. 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  - discussed with Neurology, check EEG and MRI brain  - consider PTSD from electrocution injury  - TSH 1.9  - check B12 level  - stop neurontin  - needs formal memory testing as outpatient.     Boston's esophagus  -Continue home PPI     LEONOR  HTN  -Continue home meds     Diabetes mellitus  - a1c 5.5    Anxiety    Leukocytosis  - no infiltrate on CXR, UA rather bland  - resolved    DVT prophylaxis:  Medical and mechanical DVT prophylaxis orders are present.       AM-PAC 6 Clicks Score (PT): 24 (08/24/21 0800)    Disposition: I expect the patient to be discharged home TBD    CODE STATUS:   Code Status and Medical Interventions:   Ordered at: 08/23/21 1906     Level Of Support Discussed With:    Patient     Code Status:    CPR     Medical Interventions (Level of  Support Prior to Arrest):    Full       Janes Rubin MD  08/24/21

## 2021-08-26 ENCOUNTER — TRANSITIONAL CARE MANAGEMENT TELEPHONE ENCOUNTER (OUTPATIENT)
Dept: CALL CENTER | Facility: HOSPITAL | Age: 61
End: 2021-08-26

## 2021-08-26 LAB
BACTERIA SPEC AEROBE CULT: NORMAL
BACTERIA SPEC AEROBE CULT: NORMAL

## 2021-08-26 RX ORDER — AMLODIPINE BESYLATE 5 MG/1
TABLET ORAL
Qty: 30 TABLET | Refills: 3 | Status: SHIPPED | OUTPATIENT
Start: 2021-08-26 | End: 2021-11-23

## 2021-08-26 RX ORDER — NITROGLYCERIN 0.4 MG/1
TABLET SUBLINGUAL
Qty: 25 TABLET | Refills: 0 | Status: SHIPPED | OUTPATIENT
Start: 2021-08-26 | End: 2021-11-23

## 2021-08-26 NOTE — TELEPHONE ENCOUNTER
Patient last seen on 6/23/21; has not been able to establish with cardiologist yet- sent refill for amlodipine 5mg daily to [facility] on [address].     Cleo Hathaway, PharmD

## 2021-08-26 NOTE — TELEPHONE ENCOUNTER
Patient of Carolin, has not been able to establish with cardiology. Please review and refill PRN Nitro if indicated.

## 2021-08-27 LAB
QT INTERVAL: 416 MS
QTC INTERVAL: 411 MS

## 2021-08-30 ENCOUNTER — OFFICE VISIT (OUTPATIENT)
Dept: FAMILY MEDICINE CLINIC | Facility: CLINIC | Age: 61
End: 2021-08-30

## 2021-08-30 ENCOUNTER — TELEPHONE (OUTPATIENT)
Dept: FAMILY MEDICINE CLINIC | Facility: CLINIC | Age: 61
End: 2021-08-30

## 2021-08-30 VITALS
WEIGHT: 223.6 LBS | DIASTOLIC BLOOD PRESSURE: 72 MMHG | TEMPERATURE: 96.9 F | HEART RATE: 62 BPM | OXYGEN SATURATION: 97 % | SYSTOLIC BLOOD PRESSURE: 140 MMHG | BODY MASS INDEX: 30.28 KG/M2 | HEIGHT: 72 IN | RESPIRATION RATE: 16 BRPM

## 2021-08-30 DIAGNOSIS — F01.50 VASCULAR DEMENTIA WITHOUT BEHAVIORAL DISTURBANCE (HCC): ICD-10-CM

## 2021-08-30 DIAGNOSIS — R06.02 SHORTNESS OF BREATH: ICD-10-CM

## 2021-08-30 DIAGNOSIS — I10 ESSENTIAL HYPERTENSION: ICD-10-CM

## 2021-08-30 DIAGNOSIS — R41.3 MEMORY LOSS: Primary | ICD-10-CM

## 2021-08-30 DIAGNOSIS — I25.118 CORONARY ARTERY DISEASE OF NATIVE HEART WITH STABLE ANGINA PECTORIS, UNSPECIFIED VESSEL OR LESION TYPE (HCC): ICD-10-CM

## 2021-08-30 DIAGNOSIS — Z78.9 DEFICITS IN ACTIVITIES OF DAILY LIVING: ICD-10-CM

## 2021-08-30 DIAGNOSIS — K22.70 BARRETT'S ESOPHAGUS WITHOUT DYSPLASIA: ICD-10-CM

## 2021-08-30 DIAGNOSIS — E78.5 DYSLIPIDEMIA: ICD-10-CM

## 2021-08-30 PROCEDURE — 99215 OFFICE O/P EST HI 40 MIN: CPT | Performed by: INTERNAL MEDICINE

## 2021-08-31 NOTE — PROGRESS NOTES
Chief Complaint   Patient presents with   • Hospital Follow Up Visit     8/25/21   Date of admission 8/21/21  Date of discharge 8/25/21  Current outpatient and discharge medications have been reconciled for the patient.  Reviewed by: Nik Bolaños DO    HPI:  Félix Brian is a 61 y.o. male who presents today for hospital follow-up.  Admitted for chest pain, left heart cath consistent with nonobstructive coronary disease.  He is taking medication as prescribed.  His main concern today is worsening memory loss.  Ongoing for the last few months.    ROS:  Constitutional: no fevers, night sweats or unexplained weight loss  Eyes: no vision changes  ENT: no runny nose, ear pain, sore throat  Cardio: no chest pain, palpitations  Pulm: no shortness of breath, wheezing, or cough  GI: no abdominal pain or changes in bowel movements  : no difficulty urinating  MSK: no difficulty ambulating, no joint pain  Neuro: no weakness, dizziness or headache  Psych: no trouble sleeping  Endo: no change in appetite      Past Medical History:   Diagnosis Date   • Arthritis    • Boston esophagus    • GERD (gastroesophageal reflux disease)    • Hypertension       Family History   Problem Relation Age of Onset   • Cancer Mother    • No Known Problems Father    • Diabetes Brother    • Cancer Maternal Grandmother    • Cancer Maternal Grandfather    • Cancer Paternal Grandmother    • Cancer Paternal Grandfather       Social History     Socioeconomic History   • Marital status:      Spouse name: Not on file   • Number of children: Not on file   • Years of education: Not on file   • Highest education level: Not on file   Tobacco Use   • Smoking status: Never Smoker   • Smokeless tobacco: Never Used   Vaping Use   • Vaping Use: Never used   Substance and Sexual Activity   • Alcohol use: No   • Drug use: Yes     Types: Marijuana   • Sexual activity: Defer      No Known Allergies     There is no immunization history on file  for this patient.     PE:  Vitals:    08/30/21 1317   BP: 140/72   Pulse: 62   Resp: 16   Temp: 96.9 °F (36.1 °C)   SpO2: 97%      Body mass index is 30.32 kg/m².    Gen Appearance: NAD  HEENT: Normocephalic, PERRLA, no thyromegaly, trache midline  Heart: RRR, normal S1 and S2, no murmur  Lungs: CTA b/l, no wheezing, no crackles  Abdomen: Soft, non-tender, non-distended, no guarding and BSx4  MSK: Moves all extremities well, normal gait, no peripheral edema  Pulses: Palpable and equal b/l  Lymph nodes: No palpable lymphadenopathy   Neuro: No focal deficits      Current Outpatient Medications   Medication Sig Dispense Refill   • amLODIPine (NORVASC) 5 MG tablet Take 1 tablet by mouth once daily 30 tablet 3   • aspirin 81 MG EC tablet Take 1 tablet by mouth Daily. 30 tablet 3   • atorvastatin (LIPITOR) 20 MG tablet Take 1 tablet by mouth Daily. 30 tablet 2   • Cholecalciferol (Vitamin D-3) 125 MCG (5000 UT) tablet Take 1 tablet by mouth Daily. 90 tablet 1   • gabapentin (NEURONTIN) 300 MG capsule Take 1 capsule by mouth 2 (Two) Times a Day. 60 capsule 5   • hydrOXYzine (ATARAX) 25 MG tablet Take 2 tablets by mouth 2 (Two) Times a Day As Needed for Anxiety. 90 tablet 3   • isosorbide mononitrate (IMDUR) 30 MG 24 hr tablet Take 2 tablets by mouth Daily. 30 tablet 11   • meloxicam (MOBIC) 7.5 MG tablet 1 Oral Daily with food. 30 tablet 1   • metoprolol succinate XL (TOPROL-XL) 25 MG 24 hr tablet Take 1 tablet by mouth Daily. 30 tablet 3   • nitroglycerin (NITROSTAT) 0.4 MG SL tablet DISSOLVE ONE TABLET UNDER THE TONGUE EVERY 5 MINUTES AS NEEDED FOR CHEST PAIN.  DO NOT EXCEED A TOTAL OF 3 DOSES IN 15 MINUTES 25 tablet 0   • omeprazole (priLOSEC) 40 MG capsule Take 40 mg by mouth Daily. OTC       No current facility-administered medications for this visit.     Counseling was given to patient and family for the following topics: diagnostic results, instructions for management, prognosis, impressions, risks and benefits of  treatment options and importance of treatment compliance . Total time of the encounter was 60 minutes and 31 minutes was spent face to face counseling.    Memory loss is concerning.  Patient reports he will unlikely remember our conversation tonight.  He plans on having a nearby friend drive him home.  He reports driving is now difficult due to reaction time.  Memory loss slowly worsening over the last few months.  According to family members onset was around April.  He does have a support system in [place].  He has a nearby sister and [place] farm where the owners will be able to help if needed.  I had a long conversation with patient also his sister on the phone regarding diagnosis and prognosis.  He really needs to establish care with a neurologist.  He has mild to moderate small vessel disease on brain MRI.  I suspect Alzheimer's versus vascular dementia.  Nonobstructive coronary disease found on her cath.  Recommend continue follow-up with cardiology.  He is short of breath today although oxygenating well.  Recommend proceeding with PFTs and pulmonary eval.  Continue current medication as is.  Recommend occupational therapy for home safety assessment as well as help with ADLs if needed.  He has a neurologist appointment in 1 month, will see if we can move this up sooner.    I recommended return visit to ER due to his condition although patient declined.  He feels much more comfortable going home.    Diagnoses and all orders for this visit:    1. Memory loss (Primary)    2. Vascular dementia without behavioral disturbance (CMS/HCC)    3. Coronary artery disease of native heart with stable angina pectoris, unspecified vessel or lesion type (CMS/HCC)    4. Essential hypertension    5. Dyslipidemia    6. Boston's esophagus without dysplasia    7. Deficits in activities of daily living    8. Shortness of breath         No follow-ups on file.     Please note that portions of this document were completed with a  voice recognition program. Efforts were made to edit the dictations, but occasionally words are mis-transcribed.

## 2021-09-07 LAB
QT INTERVAL: 398 MS
QTC INTERVAL: 420 MS

## 2021-09-10 ENCOUNTER — OFFICE VISIT (OUTPATIENT)
Dept: PULMONOLOGY | Facility: CLINIC | Age: 61
End: 2021-09-10

## 2021-09-10 VITALS
SYSTOLIC BLOOD PRESSURE: 124 MMHG | DIASTOLIC BLOOD PRESSURE: 74 MMHG | WEIGHT: 228 LBS | TEMPERATURE: 97.8 F | OXYGEN SATURATION: 97 % | HEART RATE: 76 BPM | BODY MASS INDEX: 30.88 KG/M2 | HEIGHT: 72 IN

## 2021-09-10 DIAGNOSIS — F41.1 GENERALIZED ANXIETY DISORDER: ICD-10-CM

## 2021-09-10 DIAGNOSIS — K22.70 BARRETT'S ESOPHAGUS WITHOUT DYSPLASIA: ICD-10-CM

## 2021-09-10 DIAGNOSIS — R06.02 SHORTNESS OF BREATH: Primary | ICD-10-CM

## 2021-09-10 PROCEDURE — 99214 OFFICE O/P EST MOD 30 MIN: CPT | Performed by: NURSE PRACTITIONER

## 2021-09-10 NOTE — PROGRESS NOTES
"[facility] Pulmonary Evaluation    Chief Complaint  Shortness of Breath    Referring Provider: Alejandra Jj MD    Subjective          Félix Brian presents to [facility] for evaluation of shortness of breath.  He states he is feels like he is always been short of breath with activity even since childhood.  Although he has noticed worsening in the last couple months with minimal activity.  He has no cough or sputum production.  No chest tightness or wheezing.    He is a non-smoker.  No significant environmental or occupational exposures.  No significant family history of lung disease.    He does have some issues with poor sleep quality as well as some history of witnessed snoring and apnea.  He does complain of generalized fatigue during the day but denies hypersomnolence.    He also has Boston's esophagitis for the last several years with chronic reflux.  He also has chronic dysphagia symptoms.  He does choke frequently.    He has had a stress test as well as an echocardiogram due to his shortness of breath and chest tightness.  On his stress test he did not have any evidence of desaturation..  He also underwent heart catheterization on August 23 with some mild nonobstructing coronary artery disease.    Objective     Vital Signs:   /74   Pulse 76   Temp 97.8 °F (36.6 °C)   Ht 182.9 cm (72.01\")   Wt 103 kg (228 lb)   SpO2 97% Comment: resting, room air  BMI 30.91 kg/m²         There is no immunization history on file for this patient.    Physical Exam  Vitals reviewed.   Constitutional:       General: He is not in acute distress.     Appearance: He is well-developed. He is obese. He is not ill-appearing.   HENT:      Head: Normocephalic and atraumatic.   Eyes:      Pupils: Pupils are equal, round, and reactive to light.   Cardiovascular:      Rate and Rhythm: Normal rate and regular rhythm.      Heart sounds: No murmur heard.     Pulmonary:      " Effort: Pulmonary effort is normal. No respiratory distress.      Breath sounds: Normal breath sounds. No wheezing or rales.   Abdominal:      General: Bowel sounds are normal. There is no distension.      Palpations: Abdomen is soft.   Musculoskeletal:         General: Normal range of motion.      Cervical back: Normal range of motion and neck supple.      Right lower leg: No edema.      Left lower leg: No edema.   Skin:     General: Skin is warm and dry.      Findings: No erythema.   Neurological:      Mental Status: He is alert and oriented to person, place, and time.   Psychiatric:         Behavior: Behavior normal.          Result Review :       Data reviewed: Radiologic studies Coronary CT angiogram, chest x-ray and Cardiology studies Echocardiogram and stress test                  Assessment and Plan    Problem List Items Addressed This Visit        Gastrointestinal Abdominal     Boston's esophagus without dysplasia       Mental Health    Generalized anxiety disorder      Other Visit Diagnoses     Shortness of breath    -  Primary          We reviewed his history today in the office.  He will need follow-up full PFTs for further evaluation.  We did review his coronary angiogram films did not show any evidence of interstitial lung disease, he does have a granuloma.  There is no evidence of desaturation on his stress test.    I would like to rule out obstructive sleep apnea as a cause of his chronic dyspnea with activity, especially given his underlying small vessel disease and early dementia.    We did discuss his shortness of breath with activity is likely multifactorial, annual follow-up here in the office on the above testing.      I spent 45 pound bag of minutes caring for Félix on this date of service. This time includes time spent by me in the following activities:preparing for the visit, reviewing tests, obtaining and/or reviewing a separately obtained history, performing a medically appropriate  examination and/or evaluation , counseling and educating the patient/family/caregiver, ordering medications, tests, or procedures, referring and communicating with other health care professionals , documenting information in the medical record, independently interpreting results and communicating that information with the patient/family/caregiver and care coordination    Follow Up     No follow-ups on file.  Patient was given instructions and counseling regarding his condition or for health maintenance advice. Please see specific information pulled into the AVS if appropriate.     MARIANNA Landry, ACNP  [facility] Pulmonary Critical Care Medicine  Electronically signed

## 2021-09-15 PROCEDURE — 93272 ECG/REVIEW INTERPRET ONLY: CPT | Performed by: INTERNAL MEDICINE

## 2021-09-28 ENCOUNTER — OFFICE VISIT (OUTPATIENT)
Dept: NEUROLOGY | Facility: CLINIC | Age: 61
End: 2021-09-28

## 2021-09-28 VITALS
WEIGHT: 225 LBS | HEART RATE: 60 BPM | DIASTOLIC BLOOD PRESSURE: 74 MMHG | HEIGHT: 72 IN | TEMPERATURE: 97.5 F | BODY MASS INDEX: 30.48 KG/M2 | SYSTOLIC BLOOD PRESSURE: 122 MMHG | OXYGEN SATURATION: 98 %

## 2021-09-28 DIAGNOSIS — G56.91 NEUROPATHY OF RIGHT HAND: Primary | Chronic | ICD-10-CM

## 2021-09-28 DIAGNOSIS — G43.C0 PERIODIC HEADACHE SYNDROME, NOT INTRACTABLE: ICD-10-CM

## 2021-09-28 PROCEDURE — 99214 OFFICE O/P EST MOD 30 MIN: CPT | Performed by: PSYCHIATRY & NEUROLOGY

## 2021-09-28 RX ORDER — DIVALPROEX SODIUM 500 MG/1
500 TABLET, EXTENDED RELEASE ORAL DAILY
Qty: 30 TABLET | Refills: 5 | Status: SHIPPED | OUTPATIENT
Start: 2021-09-28 | End: 2021-10-27

## 2021-09-28 NOTE — PROGRESS NOTES
"Chief Complaint    Headache    Subjective          Félix Brian presents to [facility] NEUROLOGY     History of Present Illness    61 y.o. male returns in follow up.  Last visit on 8/3/21 added GBP.     Admitted [facility] 8/21/21 - 8/25/21 L lower CP.  [facility] 8/23 no sig stenosis,     MRI Brain, my review of films, mild small vessel dz  EEG - normal    HA are daily.  Located at top of head.  Quality is moderate to severe.  Last for hours.      EMG/NCS.     1.  Mild right median sensory neuropathy at the wrist, ie carpal tunnel syndrome.  2.  Mild right ulnar sensory neuropathy at the wrist.  3,  Mild right ulnar neuropathy at the elbow, ie cubital tunnel syndrome     February had accident and electrocuted in right hand.  Immediately felt burning.  Then right hand waxing and waning itching.  Most discomfort when extending arm.      Objective   Vital Signs:   /74   Pulse 60   Temp 97.5 °F (36.4 °C)   Ht 182.9 cm (72.01\")   Wt 102 kg (225 lb)   SpO2 98%   BMI 30.51 kg/m²       Physical Exam  Eyes:      Extraocular Movements: EOM normal.      Pupils: Pupils are equal, round, and reactive to light.   Neurological:      Mental Status: He is oriented to person, place, and time.      Gait: Gait is intact.      Deep Tendon Reflexes: Strength normal.   Psychiatric:         Speech: Speech normal.          Neurologic Exam     Mental Status   Oriented to person, place, and time.   Speech: speech is normal   Level of consciousness: alert  Knowledge: good and consistent with education.   Normal comprehension.     Cranial Nerves   Cranial nerves II through XII intact.     CN II   Visual fields full to confrontation.   Visual acuity: normal  Right visual field deficit: none  Left visual field deficit: none     CN III, IV, VI   Pupils are equal, round, and reactive to light.  Extraocular motions are normal.   Nystagmus: none   Diplopia: none  Ophthalmoparesis: none  Upgaze: normal  Downgaze: " normal  Conjugate gaze: present    CN V   Facial sensation intact.   Right corneal reflex: normal  Left corneal reflex: normal    CN VII   Right facial weakness: none  Left facial weakness: none    CN VIII   Hearing: intact    CN IX, X   Palate: symmetric  Right gag reflex: normal  Left gag reflex: normal    CN XI   Right sternocleidomastoid strength: normal  Left sternocleidomastoid strength: normal    CN XII   Tongue: not atrophic  Fasciculations: absent  Tongue deviation: none    Motor Exam   Muscle bulk: normal  Overall muscle tone: normal    Strength   Strength 5/5 throughout.     Sensory Exam   Light touch normal.     Gait, Coordination, and Reflexes     Gait  Gait: normal    Tremor   Resting tremor: absent  Intention tremor: absent  Action tremor: absent    Reflexes   Reflexes 2+ except as noted.      Result Review :   The following data was reviewed by: Surendra Nicole MD on 09/28/2021:  Common labs    Common Labsle 8/22/21 8/22/21 8/22/21 8/22/21 8/23/21 8/23/21 8/24/21    0445 0445 0445 0445 0448 0448    Glucose   109 (A)   95 161 (A)   BUN   16   18 16   Creatinine   1.05   1.00 0.82   eGFR Non  Am   72   76 96   Sodium   141   142 141   Potassium   4.1   4.1 3.6   Chloride   109 (A)   109 (A) 108 (A)   Calcium   8.8   9.1 9.0   WBC 11.22 (A)    8.61     Hemoglobin 13.3    13.2     Hematocrit 39.3    40.3     Platelets 200    184     Total Cholesterol  110        Triglycerides  94        HDL Cholesterol  42        LDL Cholesterol   50        Hemoglobin A1C    5.50      (A) Abnormal value            Data reviewed: Radiologic studies MRI Jason          Assessment and Plan    Diagnoses and all orders for this visit:    1. Neuropathy of right hand (Primary)    2. Periodic headache syndrome, not intractable  Assessment & Plan:  Headaches are worsening.  Medication changes per orders.     Start Depakote  mg qhs           Other orders  -     divalproex (DEPAKOTE ER) 500 MG 24 hr tablet; Take 1  tablet by mouth Daily for 30 days. Take one tablet at bedtime  Dispense: 30 tablet; Refill: 5      Follow Up   No follow-ups on file.  Patient was given instructions and counseling regarding his condition or for health maintenance advice. Please see specific information pulled into the AVS if appropriate.

## 2021-09-29 ENCOUNTER — TELEPHONE (OUTPATIENT)
Dept: FAMILY MEDICINE CLINIC | Facility: CLINIC | Age: 61
End: 2021-09-29

## 2021-09-29 ENCOUNTER — HOME HEALTH ADMISSION (OUTPATIENT)
Dept: HOME HEALTH SERVICES | Facility: HOME HEALTHCARE | Age: 61
End: 2021-09-29

## 2021-09-30 ENCOUNTER — OFFICE VISIT (OUTPATIENT)
Dept: FAMILY MEDICINE CLINIC | Facility: CLINIC | Age: 61
End: 2021-09-30

## 2021-09-30 ENCOUNTER — TELEPHONE (OUTPATIENT)
Dept: FAMILY MEDICINE CLINIC | Facility: CLINIC | Age: 61
End: 2021-09-30

## 2021-09-30 VITALS
WEIGHT: 222.6 LBS | OXYGEN SATURATION: 97 % | BODY MASS INDEX: 30.15 KG/M2 | HEIGHT: 72 IN | HEART RATE: 59 BPM | SYSTOLIC BLOOD PRESSURE: 140 MMHG | DIASTOLIC BLOOD PRESSURE: 86 MMHG | TEMPERATURE: 96.9 F | RESPIRATION RATE: 16 BRPM

## 2021-09-30 DIAGNOSIS — F41.1 GENERALIZED ANXIETY DISORDER: ICD-10-CM

## 2021-09-30 DIAGNOSIS — R41.3 MEMORY LOSS: ICD-10-CM

## 2021-09-30 DIAGNOSIS — G47.19 EXCESSIVE DAYTIME SLEEPINESS: Primary | ICD-10-CM

## 2021-09-30 DIAGNOSIS — E66.9 CLASS 1 OBESITY WITH SERIOUS COMORBIDITY AND BODY MASS INDEX (BMI) OF 30.0 TO 30.9 IN ADULT, UNSPECIFIED OBESITY TYPE: ICD-10-CM

## 2021-09-30 DIAGNOSIS — F03.90 DEMENTIA WITHOUT BEHAVIORAL DISTURBANCE, UNSPECIFIED DEMENTIA TYPE: ICD-10-CM

## 2021-09-30 PROCEDURE — 99214 OFFICE O/P EST MOD 30 MIN: CPT | Performed by: INTERNAL MEDICINE

## 2021-09-30 RX ORDER — LORAZEPAM 0.5 MG/1
TABLET ORAL
Qty: 10 TABLET | Refills: 0 | Status: SHIPPED | OUTPATIENT
Start: 2021-09-30 | End: 2021-10-04

## 2021-09-30 RX ORDER — DONEPEZIL HYDROCHLORIDE 5 MG/1
5 TABLET, FILM COATED ORAL NIGHTLY
Qty: 90 TABLET | Refills: 1 | Status: SHIPPED | OUTPATIENT
Start: 2021-09-30 | End: 2021-10-19

## 2021-09-30 RX ORDER — ATORVASTATIN CALCIUM 20 MG/1
TABLET, FILM COATED ORAL
Qty: 90 TABLET | Refills: 0 | Status: SHIPPED | OUTPATIENT
Start: 2021-09-30 | End: 2021-11-23

## 2021-09-30 NOTE — PROGRESS NOTES
Chief Complaint   Patient presents with   • Memory Loss     1 month Follow up       HPI:  Félix Brian is a 61 y.o. male who presents today for follow-up of memory loss and anxiety.  Patient is frustrated about recent neurology appointment.  He is asking about a second opinion appointment.  He continues to complain about daily symptoms of memory loss, anxiety, excessive sleepiness, and shortness of breath.    ROS:  Constitutional: no fevers, night sweats or unexplained weight loss  Eyes: no vision changes  ENT: no runny nose, ear pain, sore throat  Cardio: no chest pain, palpitations  Pulm: + shortness of breath, wheezing, or cough  GI: no abdominal pain or changes in bowel movements  : no difficulty urinating  MSK: no difficulty ambulating, no joint pain  Neuro: no weakness, dizziness + headache  Psych: no trouble sleeping  Endo: no change in appetite      Past Medical History:   Diagnosis Date   • Arthritis    • Boston esophagus    • GERD (gastroesophageal reflux disease)    • Hypertension       Family History   Problem Relation Age of Onset   • Cancer Mother    • No Known Problems Father    • Diabetes Brother    • Cancer Maternal Grandmother    • Cancer Maternal Grandfather    • Cancer Paternal Grandmother    • Cancer Paternal Grandfather       Social History     Socioeconomic History   • Marital status:      Spouse name: Not on file   • Number of children: Not on file   • Years of education: Not on file   • Highest education level: Not on file   Tobacco Use   • Smoking status: Never Smoker   • Smokeless tobacco: Never Used   Vaping Use   • Vaping Use: Never used   Substance and Sexual Activity   • Alcohol use: No   • Drug use: Yes     Types: Marijuana   • Sexual activity: Defer      No Known Allergies     There is no immunization history on file for this patient.     PE:  Vitals:    09/30/21 1221   BP: 140/86   Pulse: 59   Resp: 16   Temp: 96.9 °F (36.1 °C)   SpO2: 97%      Body mass index is  30.18 kg/m².    Gen Appearance: NAD  HEENT: Normocephalic, PERRLA, no thyromegaly, trache midline  Heart: RRR, normal S1 and S2, no murmur  Lungs: CTA b/l, no wheezing, no crackles  Abdomen: Soft, non-tender, non-distended, no guarding and BSx4  MSK: Moves all extremities well, normal gait, no peripheral edema  Pulses: Palpable and equal b/l  Lymph nodes: No palpable lymphadenopathy   Neuro: No focal deficits      Current Outpatient Medications   Medication Sig Dispense Refill   • amLODIPine (NORVASC) 5 MG tablet Take 1 tablet by mouth once daily 30 tablet 3   • aspirin 81 MG EC tablet Take 1 tablet by mouth Daily. 30 tablet 3   • atorvastatin (LIPITOR) 20 MG tablet Take 1 tablet by mouth Daily. 30 tablet 2   • Cholecalciferol (Vitamin D-3) 125 MCG (5000 UT) tablet Take 1 tablet by mouth Daily. 90 tablet 1   • divalproex (DEPAKOTE ER) 500 MG 24 hr tablet Take 1 tablet by mouth Daily for 30 days. Take one tablet at bedtime 30 tablet 5   • hydrOXYzine (ATARAX) 25 MG tablet Take 2 tablets by mouth 2 (Two) Times a Day As Needed for Anxiety. 90 tablet 3   • isosorbide mononitrate (IMDUR) 30 MG 24 hr tablet Take 2 tablets by mouth Daily. 30 tablet 11   • meloxicam (MOBIC) 7.5 MG tablet 1 Oral Daily with food. 30 tablet 1   • metoprolol succinate XL (TOPROL-XL) 25 MG 24 hr tablet Take 1 tablet by mouth Daily. 30 tablet 3   • nitroglycerin (NITROSTAT) 0.4 MG SL tablet DISSOLVE ONE TABLET UNDER THE TONGUE EVERY 5 MINUTES AS NEEDED FOR CHEST PAIN.  DO NOT EXCEED A TOTAL OF 3 DOSES IN 15 MINUTES 25 tablet 0   • omeprazole (priLOSEC) 40 MG capsule Take 40 mg by mouth Daily. OTC     • donepezil (Aricept) 5 MG tablet Take 1 tablet by mouth Every Night. 90 tablet 1   • LORazepam (Ativan) 0.5 MG tablet 1-2 tabs daily as needed for anxiety 10 tablet 0     No current facility-administered medications for this visit.      He has several upcoming second opinion appointments with neurology.  He continues to have memory loss.  He does  have some evidence of vascular disease on recent MRI.  Recommend starting on daily Aricept until he can establish with neurology.  Encourage patient to take Depakote for headaches as well.  After reviewing recent hospitalization notes it appears that his symptoms significantly improved with Ativan in the ER.  It was noted that once he calmed down his memory loss improved and chest pain resolved.  His work-up has been relatively normal so far.  He has an upcoming appointment with allergy for PFTs.  I would recommend ruling out sleep apnea with home sleep study.  I will send him a short course of Ativan to take as needed for anxious episodes to see if this improves symptoms in the meantime.  Recommend follow-up 1 month for reassessment.    We are still attempting to get him home health.    Unable to get a hold of emergency contact Maame to discuss plan of care, I did leave her voicemail.    Diagnoses and all orders for this visit:    1. Excessive daytime sleepiness (Primary)  -     Home Sleep Study; Future    2. Memory loss  -     Home Sleep Study; Future  -     donepezil (Aricept) 5 MG tablet; Take 1 tablet by mouth Every Night.  Dispense: 90 tablet; Refill: 1    3. Dementia without behavioral disturbance, unspecified dementia type (HCC)    4. Generalized anxiety disorder  -     LORazepam (Ativan) 0.5 MG tablet; 1-2 tabs daily as needed for anxiety  Dispense: 10 tablet; Refill: 0    5. Class 1 obesity with serious comorbidity and body mass index (BMI) of 30.0 to 30.9 in adult, unspecified obesity type  -     Home Sleep Study; Future         No follow-ups on file.     Please note that portions of this document were completed with a voice recognition program. Efforts were made to edit the dictations, but occasionally words are mis-transcribed.

## 2021-10-01 DIAGNOSIS — Z01.812 BLOOD TESTS PRIOR TO TREATMENT OR PROCEDURE: Primary | ICD-10-CM

## 2021-10-01 NOTE — TELEPHONE ENCOUNTER
Called pt's sister/emergency contact, Maame. Informed that from office note, provider called and left a voicemail yesterday.  Stated she hasnt gotten a voicemail message from provider. Confirmed the best phone number to reach her is [phone] not main number listed.

## 2021-10-04 ENCOUNTER — CLINICAL SUPPORT NO REQUIREMENTS (OUTPATIENT)
Dept: PULMONOLOGY | Facility: CLINIC | Age: 61
End: 2021-10-04

## 2021-10-04 DIAGNOSIS — Z01.812 BLOOD TESTS PRIOR TO TREATMENT OR PROCEDURE: ICD-10-CM

## 2021-10-04 DIAGNOSIS — F41.1 GENERALIZED ANXIETY DISORDER: ICD-10-CM

## 2021-10-04 PROCEDURE — 99211 OFF/OP EST MAY X REQ PHY/QHP: CPT | Performed by: NURSE PRACTITIONER

## 2021-10-04 PROCEDURE — U0004 COV-19 TEST NON-CDC HGH THRU: HCPCS | Performed by: NURSE PRACTITIONER

## 2021-10-04 RX ORDER — LORAZEPAM 0.5 MG/1
TABLET ORAL
Qty: 10 TABLET | Refills: 0 | Status: SHIPPED | OUTPATIENT
Start: 2021-10-04 | End: 2021-10-14

## 2021-10-04 NOTE — TELEPHONE ENCOUNTER
Last Office Visit: 09/30/2021  Next Office Visit: 10/27/2021        Please review pended refill request for any changes needed on refills or quantities. Thank you!

## 2021-10-05 LAB — SARS-COV-2 RNA NOSE QL NAA+PROBE: NOT DETECTED

## 2021-10-06 ENCOUNTER — OFFICE VISIT (OUTPATIENT)
Dept: PULMONOLOGY | Facility: CLINIC | Age: 61
End: 2021-10-06

## 2021-10-06 VITALS
TEMPERATURE: 97.4 F | DIASTOLIC BLOOD PRESSURE: 72 MMHG | OXYGEN SATURATION: 98 % | HEIGHT: 72 IN | BODY MASS INDEX: 30.48 KG/M2 | HEART RATE: 67 BPM | WEIGHT: 225 LBS | SYSTOLIC BLOOD PRESSURE: 126 MMHG

## 2021-10-06 DIAGNOSIS — R06.02 SHORTNESS OF BREATH: Primary | ICD-10-CM

## 2021-10-06 DIAGNOSIS — Z72.820 POOR SLEEP: ICD-10-CM

## 2021-10-06 PROCEDURE — 99213 OFFICE O/P EST LOW 20 MIN: CPT | Performed by: NURSE PRACTITIONER

## 2021-10-06 PROCEDURE — 94375 RESPIRATORY FLOW VOLUME LOOP: CPT | Performed by: NURSE PRACTITIONER

## 2021-10-06 PROCEDURE — 94729 DIFFUSING CAPACITY: CPT | Performed by: NURSE PRACTITIONER

## 2021-10-06 PROCEDURE — 94726 PLETHYSMOGRAPHY LUNG VOLUMES: CPT | Performed by: NURSE PRACTITIONER

## 2021-10-06 NOTE — PROGRESS NOTES
"[facility] Pulmonary Follow up      Chief Complaint  Shortness of Breath and Follow-up    Subjective          Félix Brian presents to [facility] for follow-up on his shortness of breath with full PFTs.  I last saw him on September 10.    We did discuss that he does have some characteristics of obstructive sleep apnea of chronic dyspnea, he has a home sleep study is pending per his primary care provider.    He continues to have these episodes of what feels like his heart pounding and an odd sensation.  After these episodes he is quite fatigued the next couple of days and usually has to stay in house and lie around.  He is also having worsening dementia symptoms.  This morning he called his sister at 5 AM confused about where some of his horses were.  He is also having some hallucinations.  He thought his ceiling fan was a giant spider.  He is concerned about his worsening dementia symptoms and is following up with neurology and a second opinion.      Objective     Vital Signs:   /72   Pulse 67   Temp 97.4 °F (36.3 °C)   Ht 182.9 cm (72.01\")   Wt 102 kg (225 lb)   SpO2 98% Comment: resting, room air  BMI 30.51 kg/m²         There is no immunization history on file for this patient.    Physical Exam  Vitals reviewed.   Constitutional:       Appearance: He is well-developed.   HENT:      Head: Normocephalic and atraumatic.   Eyes:      Pupils: Pupils are equal, round, and reactive to light.   Cardiovascular:      Rate and Rhythm: Normal rate and regular rhythm.      Heart sounds: No murmur heard.     Pulmonary:      Effort: Pulmonary effort is normal. No respiratory distress.      Breath sounds: Normal breath sounds. No wheezing or rales.   Abdominal:      General: Bowel sounds are normal. There is no distension.      Palpations: Abdomen is soft.   Musculoskeletal:         General: Normal range of motion.      Cervical back: Normal range of motion " and neck supple.   Skin:     General: Skin is warm and dry.      Findings: No erythema.   Neurological:      Mental Status: He is alert and oriented to person, place, and time.   Psychiatric:         Behavior: Behavior normal.          Result Review :            PFTs done in the office today:  Normal pulmonary function testing his FVC is 5.54, 108% predicted.  Total lung capacity is normal at 7.6 L, 107% predicted.  Normal DLCO                   Assessment and Plan    Problem List Items Addressed This Visit        Sleep    Poor sleep      Other Visit Diagnoses     Shortness of breath    -  Primary    Relevant Orders    Pulmonary Function Test (Completed)        At this time we reviewed that his pulmonary work-up has been quite unrevealing.  He has normal PFTs with a normal CT scan of the chest.  I do not think these episodes are related to any underlying lung disease.    He does have a follow-up sleep study to rule out any other potential causes.    I did encourage him to continue to follow-up with neurology for his worsening dementia symptoms.      Follow Up     No follow-ups on file.  Patient was given instructions and counseling regarding his condition or for health maintenance advice. Please see specific information pulled into the AVS if appropriate.     I spent 25 minutes caring for Félix on this date of service. This time includes time spent by me in the following activities:preparing for the visit, reviewing tests, obtaining and/or reviewing a separately obtained history, performing a medically appropriate examination and/or evaluation , counseling and educating the patient/family/caregiver, ordering medications, tests, or procedures, referring and communicating with other health care professionals , documenting information in the medical record and independently interpreting results and communicating that information with the patient/family/caregiver      MARIANNA Landry, ACNP  [facility] [place] Pulmonary  Critical Care Medicine  Electronically signed

## 2021-10-11 DIAGNOSIS — F41.1 GENERALIZED ANXIETY DISORDER: ICD-10-CM

## 2021-10-11 NOTE — TELEPHONE ENCOUNTER
Rx Refill Note  Requested Prescriptions     Pending Prescriptions Disp Refills   • LORazepam (ATIVAN) 0.5 MG tablet [Pharmacy Med Name: LORazepam 0.5 MG Oral Tablet] 10 tablet 0     Sig: TAKE 1 TO 2 TABLETS BY MOUTH ONCE DAILY AS NEEDED FOR ANXIETY      Last office visit with prescribing clinician: 9/30/2021      Next office visit with prescribing clinician: 10/27/2021            Tatiana Corral MA  10/11/21, 13:07 EDT

## 2021-10-12 NOTE — TELEPHONE ENCOUNTER
Called and left  for patient to return call    Nik Bolaños DO [facility] [address] Clinical Pool 1 hour ago (9:19 AM)            Is pt taking this medication daily? It should be as needed for emergency use only        OK FOR HUB TO READ MESSAGE AND DOCUMENT

## 2021-10-12 NOTE — TELEPHONE ENCOUNTER
Is pt taking this medication daily? It should be as needed for emergency use only    HUB RELAYED MESSAGE.      PATIENT IS TAKING DAILY TO HELP HIM SLEEP.    PLEASE CALL [phone]

## 2021-10-13 NOTE — TELEPHONE ENCOUNTER
PATIENT'S SISTER NISREEN, CALLED BACK ABOUT MEDICATION REFILL AND SHE IS ASKING IF THIS MEDICATION IS NOT GOING TO BE FILLED IF PCP CAN POSSIBLY GIVE PATIENT SOMETHING TO HELP HIM SLEEP AT NIGHT. NISREEN REPORTS THAT PATIENT'S ANXIETY IS VERY HIGH RIGHT NOW. NISREEN STATED THAT SHE IS WONDERING IF THE HEADACHE MEDICATION IS WHAT MAY BE THE CAUSE OF THE INCREASED ANXIETY. NISREEN WOULD LIKE TO DISCUSS THIS MORE WITH PCP, PLEASE CALL [phone].

## 2021-10-14 DIAGNOSIS — G47.00 INSOMNIA, UNSPECIFIED TYPE: Primary | ICD-10-CM

## 2021-10-14 RX ORDER — TRAZODONE HYDROCHLORIDE 50 MG/1
50 TABLET ORAL NIGHTLY
Qty: 30 TABLET | Refills: 1 | Status: SHIPPED | OUTPATIENT
Start: 2021-10-14 | End: 2021-10-27

## 2021-10-14 RX ORDER — LORAZEPAM 0.5 MG/1
TABLET ORAL
Qty: 10 TABLET | Refills: 0 | Status: SHIPPED | OUTPATIENT
Start: 2021-10-14 | End: 2021-10-27

## 2021-10-19 ENCOUNTER — LAB (OUTPATIENT)
Dept: LAB | Facility: HOSPITAL | Age: 61
End: 2021-10-19

## 2021-10-19 ENCOUNTER — OFFICE VISIT (OUTPATIENT)
Dept: NEUROLOGY | Facility: CLINIC | Age: 61
End: 2021-10-19

## 2021-10-19 VITALS — HEART RATE: 58 BPM | WEIGHT: 224 LBS | OXYGEN SATURATION: 96 % | BODY MASS INDEX: 30.34 KG/M2 | HEIGHT: 72 IN

## 2021-10-19 DIAGNOSIS — M75.42 IMPINGEMENT SYNDROME OF LEFT SHOULDER: ICD-10-CM

## 2021-10-19 DIAGNOSIS — R41.3 MEMORY LOSS: Primary | ICD-10-CM

## 2021-10-19 DIAGNOSIS — M75.02 ADHESIVE CAPSULITIS OF LEFT SHOULDER: ICD-10-CM

## 2021-10-19 DIAGNOSIS — S46.002A ROTATOR CUFF INJURY, LEFT, INITIAL ENCOUNTER: ICD-10-CM

## 2021-10-19 DIAGNOSIS — I25.708 CORONARY ARTERY DISEASE OF BYPASS GRAFT OF NATIVE HEART WITH STABLE ANGINA PECTORIS (HCC): ICD-10-CM

## 2021-10-19 LAB
AMMONIA BLD-SCNC: 18 UMOL/L (ref 16–60)
CHOLEST SERPL-MCNC: 113 MG/DL (ref 0–200)
HDLC SERPL-MCNC: 43 MG/DL (ref 40–60)
LDLC SERPL CALC-MCNC: 52 MG/DL (ref 0–100)
LDLC/HDLC SERPL: 1.2 {RATIO}
TRIGL SERPL-MCNC: 92 MG/DL (ref 0–150)
VLDLC SERPL-MCNC: 18 MG/DL (ref 5–40)

## 2021-10-19 PROCEDURE — 86592 SYPHILIS TEST NON-TREP QUAL: CPT | Performed by: PHYSICIAN ASSISTANT

## 2021-10-19 PROCEDURE — 82607 VITAMIN B-12: CPT | Performed by: PHYSICIAN ASSISTANT

## 2021-10-19 PROCEDURE — 80061 LIPID PANEL: CPT

## 2021-10-19 PROCEDURE — 99215 OFFICE O/P EST HI 40 MIN: CPT | Performed by: PHYSICIAN ASSISTANT

## 2021-10-19 PROCEDURE — 36415 COLL VENOUS BLD VENIPUNCTURE: CPT | Performed by: PHYSICIAN ASSISTANT

## 2021-10-19 PROCEDURE — 82140 ASSAY OF AMMONIA: CPT | Performed by: PHYSICIAN ASSISTANT

## 2021-10-19 RX ORDER — DONEPEZIL HYDROCHLORIDE 10 MG/1
10 TABLET, FILM COATED ORAL DAILY
Qty: 30 TABLET | Refills: 11 | Status: SHIPPED | OUTPATIENT
Start: 2021-10-19 | End: 2021-11-23

## 2021-10-19 NOTE — PROGRESS NOTES
"Subjective       Chief Complaint: memory loss      History of Present Illness   Félix Brian is a 61 y.o. male who comes to clinic today for evaluation of memory loss . He has noted symptoms since at least early 2021 marked initially by forgetfulness and word-finding difficulties. This has worsened over time, particularly after he was hospitalized at [facility] in 8/21. Additional symptoms have included impairments in orientation and executive function. There have been associated  symptoms of anxiety and depression. He denies impairments in ADL's. 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 He manages his medications.     He also reports a history of sharp left retro-orbital headaches. This has also worsened over time. He typically notes up to several headaches a daily, lasting 1-4 hours. There is associated light sensitivity and blurry vision on the left. His symptoms are worse with increased stress. He recently started depakote.     Prior evaluation has included screening blood work  and an MRI of the brain which showed mild chronic small vessel ischemic changes, but was otherwise unremarkable. An EEG was also unremarkable. He is currently taking donepezil 5mg daily.       I have reviewed and confirmed the past family, social and medical history as accurate on 10/19/21.     Review of Systems   Constitutional: Positive for appetite change and fatigue.        Lack of appetite     Insomnia    HENT: Negative.    Eyes: Negative.    Respiratory: Negative.    Cardiovascular: Negative.    Gastrointestinal: Negative.    Endocrine: Negative.    Genitourinary: Negative.    Musculoskeletal: Negative.    Skin: Negative.    Allergic/Immunologic: Negative.    Neurological:        Memory loss    Hematological: Negative.        Objective     Pulse 58   Ht 182.9 cm (72\")   Wt 102 kg (224 lb)   SpO2 96%   BMI 30.38 kg/m²         Physical " Exam  Neurological:      Mental Status: He is oriented to person, place, and time.      Coordination: Finger-Nose-Finger Test and Heel to Shin Test normal.      Gait: Gait is intact.      Deep Tendon Reflexes: Strength normal.      Reflex Scores:       Patellar reflexes are 2+ on the right side and 2+ on the left side.  Psychiatric:         Speech: Speech normal.          Neurologic Exam     Mental Status   Oriented to person, place, and time.   Registration: recalls 3 of 3 objects. Recall at 5 minutes: recalls 2 of 3 objects. Follows 3 step commands.   Attention: normal.   Speech: speech is normal   Level of consciousness: alert  Able to name object. Able to read. Able to repeat. Able to write. Normal comprehension.     Cranial Nerves   Cranial nerves II through XII intact.     Motor Exam   Muscle bulk: normal  Overall muscle tone: normal    Strength   Strength 5/5 throughout.     Sensory Exam   Light touch normal.     Gait, Coordination, and Reflexes     Gait  Gait: normal    Coordination   Finger to nose coordination: normal  Heel to shin coordination: normal    Tremor   Resting tremor: absent    Reflexes   Right patellar: 2+  Left patellar: 2+        Results  MMSE=29  MoCA=25 (4/5 visuo spatial, 0/1 fluency, 10 words, 2/5 recall)      Assessment/Plan   Diagnoses and all orders for this visit:    1. Memory loss (Primary)  -     Ammonia  -     Vitamin B12  -     RPR  -     Ambulatory Referral to Neuropsychology  -     Ambulatory Referral to Speech Therapy    Other orders  -     donepezil (Aricept) 10 MG tablet; Take 1 tablet by mouth Daily.  Dispense: 30 tablet; Refill: 11          Discussion/Summary   Félix Brian comes to clinic today for evaluation of memory loss . His history and examination today is potentially consistent with underlying Mild Cognitive Impairment. However, I am concerned that his history of anxiety and depression, poor sleep, and some of his medications (particularly lorazepam) are  negatively affecting his cognition. This was discussed. It was elected to obtain screening blood work  and neuropsychological testing . After discussing potential treatment options, it was elected to increase his donepezil to 10mg daily. I have also made a referral to SLP for cognitive rehabilitation. He will then follow up in 3-4 months , or sooner if needed.   Total time of visit today: 45 minutes. As part of this visit I reviewed prior lab results, reviewed radiology results and reviewed radiology images. I also discussed diagnosis, prognosis, diagnostic testing, evaluation, current status, treatment options and management as discussed above.         Caren Alvarez PA-C

## 2021-10-20 LAB
RPR SER QL: NORMAL
VIT B12 BLD-MCNC: 553 PG/ML (ref 211–946)

## 2021-10-20 RX ORDER — MELOXICAM 7.5 MG/1
TABLET ORAL
Qty: 30 TABLET | Refills: 1 | Status: SHIPPED | OUTPATIENT
Start: 2021-10-20 | End: 2021-11-23

## 2021-10-27 ENCOUNTER — OFFICE VISIT (OUTPATIENT)
Dept: FAMILY MEDICINE CLINIC | Facility: CLINIC | Age: 61
End: 2021-10-27

## 2021-10-27 VITALS
HEIGHT: 72 IN | HEART RATE: 69 BPM | OXYGEN SATURATION: 98 % | BODY MASS INDEX: 31.42 KG/M2 | WEIGHT: 232 LBS | SYSTOLIC BLOOD PRESSURE: 130 MMHG | DIASTOLIC BLOOD PRESSURE: 94 MMHG

## 2021-10-27 DIAGNOSIS — G47.00 INSOMNIA, UNSPECIFIED TYPE: ICD-10-CM

## 2021-10-27 DIAGNOSIS — G47.19 EXCESSIVE DAYTIME SLEEPINESS: ICD-10-CM

## 2021-10-27 DIAGNOSIS — K21.9 GASTROESOPHAGEAL REFLUX DISEASE, UNSPECIFIED WHETHER ESOPHAGITIS PRESENT: ICD-10-CM

## 2021-10-27 DIAGNOSIS — R51.9 CHRONIC NONINTRACTABLE HEADACHE, UNSPECIFIED HEADACHE TYPE: ICD-10-CM

## 2021-10-27 DIAGNOSIS — R06.02 SHORTNESS OF BREATH: ICD-10-CM

## 2021-10-27 DIAGNOSIS — I10 PRIMARY HYPERTENSION: ICD-10-CM

## 2021-10-27 DIAGNOSIS — I25.10 NONOBSTRUCTIVE ATHEROSCLEROSIS OF CORONARY ARTERY: ICD-10-CM

## 2021-10-27 DIAGNOSIS — R07.9 CHEST PAIN, UNSPECIFIED TYPE: ICD-10-CM

## 2021-10-27 DIAGNOSIS — F03.90 DEMENTIA WITHOUT BEHAVIORAL DISTURBANCE, UNSPECIFIED DEMENTIA TYPE: Primary | ICD-10-CM

## 2021-10-27 DIAGNOSIS — G89.29 CHRONIC NONINTRACTABLE HEADACHE, UNSPECIFIED HEADACHE TYPE: ICD-10-CM

## 2021-10-27 PROCEDURE — 99215 OFFICE O/P EST HI 40 MIN: CPT | Performed by: INTERNAL MEDICINE

## 2021-10-27 RX ORDER — METOPROLOL SUCCINATE 25 MG/1
TABLET, EXTENDED RELEASE ORAL
Qty: 90 TABLET | Refills: 0 | OUTPATIENT
Start: 2021-10-27

## 2021-10-27 RX ORDER — HYDROCHLOROTHIAZIDE 12.5 MG/1
12.5 TABLET ORAL DAILY
Qty: 30 TABLET | Refills: 1 | Status: SHIPPED | OUTPATIENT
Start: 2021-10-27 | End: 2021-11-23

## 2021-10-27 RX ORDER — TRAZODONE HYDROCHLORIDE 100 MG/1
100 TABLET ORAL NIGHTLY
Qty: 30 TABLET | Refills: 1 | Status: SHIPPED | OUTPATIENT
Start: 2021-10-27 | End: 2021-11-23

## 2021-10-27 RX ORDER — HYDROGEN PEROXIDE 2.65 ML/100ML
LIQUID ORAL; TOPICAL
Qty: 90 TABLET | Refills: 0 | OUTPATIENT
Start: 2021-10-27

## 2021-10-27 RX ORDER — PROPRANOLOL HCL 60 MG
60 CAPSULE, EXTENDED RELEASE 24HR ORAL DAILY
Qty: 30 CAPSULE | Refills: 1 | Status: SHIPPED | OUTPATIENT
Start: 2021-10-27 | End: 2021-11-23

## 2021-10-27 NOTE — PROGRESS NOTES
Chief Complaint   Patient presents with   • Fatigue     4 wk f/u    • Headache     still having trouble with confusion        HPI:  Félix Brian is a 61 y.o. male who presents today for follow-up.  He continues to have memory loss, fatigue, shortness of breath, chest pain, headache.  He is accompanied by his Sister Tiffany today.  He reports chest pain/headache with essentially any physical activity, he reports blood pressure spikes with physical activity as well.     ROS:  Constitutional: no fevers, night sweats or unexplained weight loss  Eyes: no vision changes  ENT: no runny nose, ear pain, sore throat  Cardio: no chest pain, palpitations  Pulm: no shortness of breath, wheezing, or cough  GI: no abdominal pain or changes in bowel movements  : no difficulty urinating  MSK: no difficulty ambulating, no joint pain  Neuro: no weakness, dizziness or headache  Psych: no trouble sleeping  Endo: no change in appetite      Past Medical History:   Diagnosis Date   • Arthritis    • [place] esophagus    • GERD (gastroesophageal reflux disease)    • Hypertension       Family History   Problem Relation Age of Onset   • Cancer Mother    • No Known Problems Father    • Diabetes Brother    • Cancer Maternal Grandmother    • Cancer Maternal Grandfather    • Cancer Paternal Grandmother    • Cancer Paternal Grandfather       Social History     Socioeconomic History   • Marital status:    Tobacco Use   • Smoking status: Never Smoker   • Smokeless tobacco: Never Used   Vaping Use   • Vaping Use: Never used   Substance and Sexual Activity   • Alcohol use: No   • Drug use: Yes     Types: Marijuana   • Sexual activity: Defer      No Known Allergies     There is no immunization history on file for this patient.     PE:  Vitals:    10/27/21 1333   BP: 130/94   Pulse:    SpO2:       Body mass index is 31.46 kg/m².    Gen Appearance: NAD  HEENT: Normocephalic, PERRLA, no thyromegaly, trache midline  Heart: RRR, normal S1 and  S2, no murmur  Lungs: CTA b/l, no wheezing, no crackles  Abdomen: Soft, non-tender, non-distended, no guarding and BSx4  MSK: Moves all extremities well, normal gait, no peripheral edema  Pulses: Palpable and equal b/l  Lymph nodes: No palpable lymphadenopathy   Neuro: No focal deficits      Current Outpatient Medications   Medication Sig Dispense Refill   • amLODIPine (NORVASC) 5 MG tablet Take 1 tablet by mouth once daily 30 tablet 3   • aspirin 81 MG EC tablet Take 1 tablet by mouth Daily. 30 tablet 3   • atorvastatin (LIPITOR) 20 MG tablet Take 1 tablet by mouth once daily 90 tablet 0   • Cholecalciferol (Vitamin D-3) 125 MCG (5000 UT) tablet Take 1 tablet by mouth Daily. 90 tablet 1   • donepezil (Aricept) 10 MG tablet Take 1 tablet by mouth Daily. 30 tablet 11   • meloxicam (MOBIC) 7.5 MG tablet 1 Oral Daily with food. 30 tablet 1   • nitroglycerin (NITROSTAT) 0.4 MG SL tablet DISSOLVE ONE TABLET UNDER THE TONGUE EVERY 5 MINUTES AS NEEDED FOR CHEST PAIN.  DO NOT EXCEED A TOTAL OF 3 DOSES IN 15 MINUTES 25 tablet 0   • omeprazole (priLOSEC) 40 MG capsule Take 40 mg by mouth Daily. OTC     • hydroCHLOROthiazide (HYDRODIURIL) 12.5 MG tablet Take 1 tablet by mouth Daily. 30 tablet 1   • propranolol LA (Inderal LA) 60 MG 24 hr capsule Take 1 capsule by mouth Daily. 30 capsule 1   • traZODone (DESYREL) 100 MG tablet Take 1 tablet by mouth Every Night. 30 tablet 1     No current facility-administered medications for this visit.      He is wanting to establish with neurology at  for ongoing dementia and chronic headaches.  Currently following with Caren Alvarez at [facility] neurology group.  I would recommend making an appointment with speech therapy as well as neuropsych testing as she recommended.  We will reorder home sleep study test to rule out sleep apnea.    In regards to chest pain he has had a recent heart cath that showed nonobstructive minor coronary disease, normal PFTs and pulmonary eval.  He does  have a history of esophageal stricture and possible spasms.  Because of this I would recommend following up with GI to evaluate chest pain.    He reports a chronic headache since being discharged from the hospital, unsure if this was ongoing before admission.  He has been taking Imdur daily which has the potential to cause headaches.  DC metoprolol and switch to propranolol which may improve headaches.  DC Imdur and switch to HCTZ for better blood pressure control.  Continue amlodipine.    Increase trazodone to 100 mg daily for insomnia.    Indications for home sleep study include obesity, witnessed apnea, snoring, memory loss, excessive daytime sleepiness.    Counseling was given to patient and family for the following topics: diagnostic results, instructions for management, impressions, risks and benefits of treatment options and importance of treatment compliance . Total time of the encounter was 40 minutes and 20 minutes was spent face to face counseling.    Diagnoses and all orders for this visit:    1. Dementia without behavioral disturbance, unspecified dementia type (HCC) (Primary)    2. Chronic nonintractable headache, unspecified headache type    3. Excessive daytime sleepiness  -     Home Sleep Study; Future    4. Primary hypertension    5. Insomnia, unspecified type  -     traZODone (DESYREL) 100 MG tablet; Take 1 tablet by mouth Every Night.  Dispense: 30 tablet; Refill: 1    6. Chest pain, unspecified type    7. Shortness of breath    8. Gastroesophageal reflux disease, unspecified whether esophagitis present    9. Nonobstructive atherosclerosis of coronary artery    Other orders  -     propranolol LA (Inderal LA) 60 MG 24 hr capsule; Take 1 capsule by mouth Daily.  Dispense: 30 capsule; Refill: 1  -     hydroCHLOROthiazide (HYDRODIURIL) 12.5 MG tablet; Take 1 tablet by mouth Daily.  Dispense: 30 tablet; Refill: 1         Return in about 2 weeks (around 11/10/2021).     Please note that portions of  this document were completed with a voice recognition program. Efforts were made to edit the dictations, but occasionally words are mis-transcribed.

## 2021-10-27 NOTE — PATIENT INSTRUCTIONS
STOP taking metoprolol and isosorbide mononitrate.    NEW blood pressure medication will be propranolol and HCTZ in addition to the amlodipine he was already taking.     Upcoming apts or phonecalls to expect are home sleep study and 2 referrals from your neurology visit. You will need to call Dr. Vickers and schedule an apt to evaluate chest pain.    Caren Alvarez (neurology at [facility]) has referred him to neuropsychology testing and also speech pathology. Please call [facility] neuro office to have these apts scheduled.    See me again in 2 weeks for blood pressure check, call me before if BP is too low (<120/70)

## 2021-11-15 DIAGNOSIS — Z20.822 ENCOUNTER FOR PREPROCEDURE SCREENING LABORATORY TESTING FOR COVID-19: Primary | ICD-10-CM

## 2021-11-15 DIAGNOSIS — Z01.812 ENCOUNTER FOR PREPROCEDURE SCREENING LABORATORY TESTING FOR COVID-19: Primary | ICD-10-CM

## 2021-11-16 ENCOUNTER — TELEPHONE (OUTPATIENT)
Dept: FAMILY MEDICINE CLINIC | Facility: CLINIC | Age: 61
End: 2021-11-16

## 2021-11-16 ENCOUNTER — APPOINTMENT (OUTPATIENT)
Dept: PREADMISSION TESTING | Facility: HOSPITAL | Age: 61
End: 2021-11-16

## 2021-11-16 ENCOUNTER — TELEPHONE (OUTPATIENT)
Dept: NEUROLOGY | Facility: CLINIC | Age: 61
End: 2021-11-16

## 2021-11-16 DIAGNOSIS — Z01.812 ENCOUNTER FOR PREPROCEDURE SCREENING LABORATORY TESTING FOR COVID-19: ICD-10-CM

## 2021-11-16 DIAGNOSIS — Z20.822 ENCOUNTER FOR PREPROCEDURE SCREENING LABORATORY TESTING FOR COVID-19: ICD-10-CM

## 2021-11-16 LAB — SARS-COV-2 RNA PNL SPEC NAA+PROBE: NOT DETECTED

## 2021-11-16 PROCEDURE — U0004 COV-19 TEST NON-CDC HGH THRU: HCPCS

## 2021-11-16 PROCEDURE — C9803 HOPD COVID-19 SPEC COLLECT: HCPCS

## 2021-11-16 NOTE — TELEPHONE ENCOUNTER
Caller: SARAH CONTEH    Relationship: Emergency Contact    Best call back number: [phone]    What medication are you requesting: ANXIETY    What are your current symptoms: SEVERE ANXIETY TO WHERE PATIENT COULD NOT DO ANYTHING    How long have you been experiencing symptoms: N/A    Have you had these symptoms before:    [x] Yes  [] No    Have you been treated for these symptoms before:   [x] Yes  [] No    If a prescription is needed, what is your preferred pharmacy and phone number: [facility] [address] [place] [address] [address] [place] [address] [phone] [facility] [phone] FX     Additional notes: SARAH STATED THAT PATIENT IS HAVING PANIC ATTACKS AND SOMETIMES KEEPS HIM FROM GETTING DAILY THINGS DONE    SARAH STATED THAT HE IS NOT ABLE TO SLEEP WITH ALL THIS GOING ON AS WELL     PLEASE ADVISE ASAP

## 2021-11-16 NOTE — TELEPHONE ENCOUNTER
Caller: SARAH CONTEH    Relationship: Emergency Contact; SISTER    Best call back number: [phone]    What was the call regarding: PT'S SISTER CALLED REQUESTING TO RELAY CLINICAL CONCERNS TO THE OFFICE REGARDING PT. PT'S SISTER, SARAH CONTEH, IS NOT LISTED ON THE  VERBAL FOR  NEURO, SO INFORMED HER I COULD NOT DISCUSS ANY CLINICAL CONCERNS REGARDING PT WITHOUT HIS PERMISSION. PT'S SISTER TO CALL BACK MOMENTARILY TO HAVE PT VERBALIZED PERMISSION TO SPEAK WITH SARAH CONTEH ON HIS BEHALF.    I DO BELIEVE PT'S SISTER IS CALLING TO DISCUSS CONCERNS MENTIONED IN ENCOUNTER TO PCP TODAY, AS THEY ADVISED PT TO CONTACT NEUROLOGY FOR [facility]ARAS'S INPUT.    Do you require a callback: PT'S SISTER TO CALL BACK WHEN PT IS AVAILABLE.    PLEASE REVIEW AND ADVISE.

## 2021-11-16 NOTE — TELEPHONE ENCOUNTER
Contacted patient's sister to discuss further. She states that he has been weaning off of his donepezil because he is unhappy with this drug and has been experiencing worsening memory loss and anxiety. I advised patient's sister to contact Neurology to alert them of this change.     He has upcoming appt 11/22. She states the patient is busy with pre-op clearances before his EGD this week. And will follow up with Neurology

## 2021-11-16 NOTE — TELEPHONE ENCOUNTER
Patients sister Tiffany called with [facility]h who gave verbal auth to speak with her. She said he is not doing well on the trazodone. Needs to look into something else. States he was in [facility] ER two weeks ago. Please advise.

## 2021-11-17 NOTE — TELEPHONE ENCOUNTER
It looks like his PCP put him on trazodone. If he isn't tolerating it well, it may be reasonable to discontinue it and make a referral to sleep medicine for further e valuation. Thanks

## 2021-11-17 NOTE — TELEPHONE ENCOUNTER
Called and spoke to pts sister, she voiced understanding on the message, but did not state if she wanted a sleep medicine referral placed before ending the call.

## 2021-11-19 ENCOUNTER — OUTSIDE FACILITY SERVICE (OUTPATIENT)
Dept: GASTROENTEROLOGY | Facility: CLINIC | Age: 61
End: 2021-11-19

## 2021-11-19 PROCEDURE — 43239 EGD BIOPSY SINGLE/MULTIPLE: CPT | Performed by: INTERNAL MEDICINE

## 2021-11-19 PROCEDURE — 43249 ESOPH EGD DILATION <30 MM: CPT | Performed by: INTERNAL MEDICINE

## 2021-11-19 PROCEDURE — 88305 TISSUE EXAM BY PATHOLOGIST: CPT | Performed by: INTERNAL MEDICINE

## 2021-11-22 ENCOUNTER — TELEPHONE (OUTPATIENT)
Dept: GASTROENTEROLOGY | Facility: CLINIC | Age: 61
End: 2021-11-22

## 2021-11-22 ENCOUNTER — LAB REQUISITION (OUTPATIENT)
Dept: LAB | Facility: HOSPITAL | Age: 61
End: 2021-11-22

## 2021-11-22 ENCOUNTER — HOSPITAL ENCOUNTER (EMERGENCY)
Facility: HOSPITAL | Age: 61
Discharge: LEFT BEFORE DISCHARGE | End: 2021-11-22

## 2021-11-22 ENCOUNTER — APPOINTMENT (OUTPATIENT)
Dept: GENERAL RADIOLOGY | Facility: HOSPITAL | Age: 61
End: 2021-11-22

## 2021-11-22 ENCOUNTER — TELEPHONE (OUTPATIENT)
Dept: FAMILY MEDICINE CLINIC | Facility: CLINIC | Age: 61
End: 2021-11-22

## 2021-11-22 ENCOUNTER — APPOINTMENT (OUTPATIENT)
Dept: CT IMAGING | Facility: HOSPITAL | Age: 61
End: 2021-11-22

## 2021-11-22 ENCOUNTER — HOSPITAL ENCOUNTER (EMERGENCY)
Facility: HOSPITAL | Age: 61
Discharge: HOME | End: 2021-11-22
Attending: EMERGENCY MEDICINE | Admitting: EMERGENCY MEDICINE

## 2021-11-22 VITALS
TEMPERATURE: 97.8 F | DIASTOLIC BLOOD PRESSURE: 97 MMHG | HEART RATE: 72 BPM | RESPIRATION RATE: 14 BRPM | BODY MASS INDEX: 46.62 KG/M2 | HEIGHT: 67 IN | OXYGEN SATURATION: 94 % | SYSTOLIC BLOOD PRESSURE: 135 MMHG | WEIGHT: 297 LBS

## 2021-11-22 VITALS
BODY MASS INDEX: 31.42 KG/M2 | HEIGHT: 72 IN | SYSTOLIC BLOOD PRESSURE: 91 MMHG | HEART RATE: 77 BPM | WEIGHT: 232 LBS | OXYGEN SATURATION: 97 % | RESPIRATION RATE: 14 BRPM | DIASTOLIC BLOOD PRESSURE: 77 MMHG

## 2021-11-22 DIAGNOSIS — K22.70 BARRETT'S ESOPHAGUS WITHOUT DYSPLASIA: ICD-10-CM

## 2021-11-22 DIAGNOSIS — K44.9 DIAPHRAGMATIC HERNIA WITHOUT OBSTRUCTION OR GANGRENE: ICD-10-CM

## 2021-11-22 DIAGNOSIS — R13.10 DYSPHAGIA, UNSPECIFIED: ICD-10-CM

## 2021-11-22 DIAGNOSIS — R41.3 AMNESIA: ICD-10-CM

## 2021-11-22 DIAGNOSIS — R41.0 CONFUSION: Primary | ICD-10-CM

## 2021-11-22 DIAGNOSIS — Q40.2 OTHER SPECIFIED CONGENITAL MALFORMATIONS OF STOMACH: ICD-10-CM

## 2021-11-22 DIAGNOSIS — R00.2 PALPITATIONS: ICD-10-CM

## 2021-11-22 DIAGNOSIS — F41.9 ANXIETY: ICD-10-CM

## 2021-11-22 LAB
ALBUMIN SERPL-MCNC: 4.7 G/DL (ref 3.5–5.2)
ALBUMIN/GLOB SERPL: 1.8 G/DL
ALP SERPL-CCNC: 79 U/L (ref 39–117)
ALT SERPL W P-5'-P-CCNC: 20 U/L (ref 1–41)
ANION GAP SERPL CALCULATED.3IONS-SCNC: 15 MMOL/L (ref 5–15)
AST SERPL-CCNC: 23 U/L (ref 1–40)
BASOPHILS # BLD AUTO: 0.01 10*3/MM3 (ref 0–0.2)
BASOPHILS NFR BLD AUTO: 0.1 % (ref 0–1.5)
BILIRUB SERPL-MCNC: 0.5 MG/DL (ref 0–1.2)
BUN SERPL-MCNC: 9 MG/DL (ref 8–23)
BUN/CREAT SERPL: 10.5 (ref 7–25)
CALCIUM SPEC-SCNC: 9.7 MG/DL (ref 8.6–10.5)
CHLORIDE SERPL-SCNC: 104 MMOL/L (ref 98–107)
CO2 SERPL-SCNC: 21 MMOL/L (ref 22–29)
CREAT SERPL-MCNC: 0.86 MG/DL (ref 0.76–1.27)
DEPRECATED RDW RBC AUTO: 42.5 FL (ref 37–54)
EOSINOPHIL # BLD AUTO: 0.15 10*3/MM3 (ref 0–0.4)
EOSINOPHIL NFR BLD AUTO: 1.7 % (ref 0.3–6.2)
ERYTHROCYTE [DISTWIDTH] IN BLOOD BY AUTOMATED COUNT: 13 % (ref 12.3–15.4)
GFR SERPL CREATININE-BSD FRML MDRD: 91 ML/MIN/1.73
GLOBULIN UR ELPH-MCNC: 2.6 GM/DL
GLUCOSE BLDC GLUCOMTR-MCNC: 117 MG/DL (ref 70–130)
GLUCOSE SERPL-MCNC: 156 MG/DL (ref 65–99)
HCT VFR BLD AUTO: 43.9 % (ref 37.5–51)
HGB BLD-MCNC: 14.9 G/DL (ref 13–17.7)
HOLD SPECIMEN: NORMAL
IMM GRANULOCYTES # BLD AUTO: 0.02 10*3/MM3 (ref 0–0.05)
IMM GRANULOCYTES NFR BLD AUTO: 0.2 % (ref 0–0.5)
LIPASE SERPL-CCNC: 19 U/L (ref 13–60)
LYMPHOCYTES # BLD AUTO: 1.48 10*3/MM3 (ref 0.7–3.1)
LYMPHOCYTES NFR BLD AUTO: 17.1 % (ref 19.6–45.3)
MCH RBC QN AUTO: 30.5 PG (ref 26.6–33)
MCHC RBC AUTO-ENTMCNC: 33.9 G/DL (ref 31.5–35.7)
MCV RBC AUTO: 89.8 FL (ref 79–97)
MONOCYTES # BLD AUTO: 0.46 10*3/MM3 (ref 0.1–0.9)
MONOCYTES NFR BLD AUTO: 5.3 % (ref 5–12)
NEUTROPHILS NFR BLD AUTO: 6.52 10*3/MM3 (ref 1.7–7)
NEUTROPHILS NFR BLD AUTO: 75.6 % (ref 42.7–76)
NRBC BLD AUTO-RTO: 0 /100 WBC (ref 0–0.2)
NT-PROBNP SERPL-MCNC: 217.9 PG/ML (ref 0–900)
OVALOCYTES BLD QL SMEAR: NORMAL
PLAT MORPH BLD: NORMAL
PLATELET # BLD AUTO: 244 10*3/MM3 (ref 140–450)
PMV BLD AUTO: 10.2 FL (ref 6–12)
POTASSIUM SERPL-SCNC: 4 MMOL/L (ref 3.5–5.2)
PROT SERPL-MCNC: 7.3 G/DL (ref 6–8.5)
QT INTERVAL: 390 MS
QT INTERVAL: 392 MS
QTC INTERVAL: 425 MS
QTC INTERVAL: 432 MS
RBC # BLD AUTO: 4.89 10*6/MM3 (ref 4.14–5.8)
SODIUM SERPL-SCNC: 140 MMOL/L (ref 136–145)
TROPONIN T SERPL-MCNC: <0.01 NG/ML (ref 0–0.03)
TROPONIN T SERPL-MCNC: <0.01 NG/ML (ref 0–0.03)
WBC MORPH BLD: NORMAL
WBC NRBC COR # BLD: 8.64 10*3/MM3 (ref 3.4–10.8)
WHOLE BLOOD HOLD SPECIMEN: NORMAL
WHOLE BLOOD HOLD SPECIMEN: NORMAL

## 2021-11-22 PROCEDURE — 70450 CT HEAD/BRAIN W/O DYE: CPT

## 2021-11-22 PROCEDURE — 96374 THER/PROPH/DIAG INJ IV PUSH: CPT

## 2021-11-22 PROCEDURE — 85025 COMPLETE CBC W/AUTO DIFF WBC: CPT | Performed by: EMERGENCY MEDICINE

## 2021-11-22 PROCEDURE — 99284 EMERGENCY DEPT VISIT MOD MDM: CPT

## 2021-11-22 PROCEDURE — 85007 BL SMEAR W/DIFF WBC COUNT: CPT | Performed by: EMERGENCY MEDICINE

## 2021-11-22 PROCEDURE — 82962 GLUCOSE BLOOD TEST: CPT

## 2021-11-22 PROCEDURE — 84484 ASSAY OF TROPONIN QUANT: CPT | Performed by: EMERGENCY MEDICINE

## 2021-11-22 PROCEDURE — 25010000002 LORAZEPAM PER 2 MG: Performed by: EMERGENCY MEDICINE

## 2021-11-22 PROCEDURE — 71045 X-RAY EXAM CHEST 1 VIEW: CPT

## 2021-11-22 PROCEDURE — 99211 OFF/OP EST MAY X REQ PHY/QHP: CPT

## 2021-11-22 PROCEDURE — 83690 ASSAY OF LIPASE: CPT | Performed by: EMERGENCY MEDICINE

## 2021-11-22 PROCEDURE — 80053 COMPREHEN METABOLIC PANEL: CPT | Performed by: EMERGENCY MEDICINE

## 2021-11-22 PROCEDURE — 93005 ELECTROCARDIOGRAM TRACING: CPT | Performed by: EMERGENCY MEDICINE

## 2021-11-22 PROCEDURE — 83880 ASSAY OF NATRIURETIC PEPTIDE: CPT | Performed by: EMERGENCY MEDICINE

## 2021-11-22 RX ORDER — SODIUM CHLORIDE 0.9 % (FLUSH) 0.9 %
10 SYRINGE (ML) INJECTION AS NEEDED
Status: DISCONTINUED | OUTPATIENT
Start: 2021-11-22 | End: 2021-11-22 | Stop reason: HOSPADM

## 2021-11-22 RX ORDER — LORAZEPAM 2 MG/ML
0.5 INJECTION INTRAMUSCULAR ONCE
Status: COMPLETED | OUTPATIENT
Start: 2021-11-22 | End: 2021-11-22

## 2021-11-22 RX ORDER — ASPIRIN 81 MG/1
324 TABLET, CHEWABLE ORAL ONCE
Status: COMPLETED | OUTPATIENT
Start: 2021-11-22 | End: 2021-11-22

## 2021-11-22 RX ADMIN — ASPIRIN 324 MG: 81 TABLET, CHEWABLE ORAL at 14:28

## 2021-11-22 RX ADMIN — LORAZEPAM 0.5 MG: 2 INJECTION INTRAMUSCULAR; INTRAVENOUS at 15:19

## 2021-11-22 NOTE — TELEPHONE ENCOUNTER
Patient called and said that he has been having severe anxiety and panic attacks. He then said he was feeling really bad and then he said he felt like he was dying. I asked him if he felt needed an ambulance and he said no. He then said his chest is feeling very tight and he is very confused. He said his sister was going to take him to the ER. He said he needed to feed his horses and then his sister was going to bring him to the ER .  I confirmed that his sister was going to bring him to the ER and he declined an ambulance again.

## 2021-11-22 NOTE — TELEPHONE ENCOUNTER
Contacted patient's sister to relay message. She reports that the patient is being very stubborn. Is practically crawling around the house because he cannot walk and he is trying to feed his horses before he leaves    I then spoke directly with the patient. His BP is 180/146 when he checked earlier, he is having sever pain behind his right eye and tightness in his chest. He think he is having a panic attack and is very anxious about being hospitalized. I tried to reassure him and encourage him to be evaluated or atleast paramedics because he is unwell. He became very emotional but agreed to go the ED. I offered to call 911 for him. He declined. I advised him I would call back in 30 mins to make sure he is going.

## 2021-11-22 NOTE — TELEPHONE ENCOUNTER
Patient's sister Tiffany is calling to report that Ramesh has been shaking uncontrollably and breaking in sweats, not feeling well since this morning. I checked with the nursing staff and they advised for patient to go to the ER. Tiffany states that is terrible that Dr. Bolaños doesn't want to see him and hung up. Please follow up.    Thank you.

## 2021-11-22 NOTE — TELEPHONE ENCOUNTER
Contacted patient to check his stauts. He is still trying to feed his horses and will not leave for the ED until they are taken care. Sister promises that she will take the patient once he is finished taking care of his horse. Safe report has been filed.

## 2021-11-22 NOTE — TELEPHONE ENCOUNTER
I don't mind to see him but with those symptoms I feel he would be better off with urgent evaluation at the ER.

## 2021-11-23 ENCOUNTER — TELEPHONE (OUTPATIENT)
Dept: FAMILY MEDICINE CLINIC | Facility: CLINIC | Age: 61
End: 2021-11-23

## 2021-11-23 ENCOUNTER — OFFICE VISIT (OUTPATIENT)
Dept: FAMILY MEDICINE CLINIC | Facility: CLINIC | Age: 61
End: 2021-11-23

## 2021-11-23 VITALS
WEIGHT: 297 LBS | BODY MASS INDEX: 46.62 KG/M2 | HEART RATE: 81 BPM | DIASTOLIC BLOOD PRESSURE: 82 MMHG | OXYGEN SATURATION: 98 % | HEIGHT: 67 IN | SYSTOLIC BLOOD PRESSURE: 120 MMHG

## 2021-11-23 DIAGNOSIS — F41.1 GENERALIZED ANXIETY DISORDER: Primary | ICD-10-CM

## 2021-11-23 DIAGNOSIS — I10 PRIMARY HYPERTENSION: ICD-10-CM

## 2021-11-23 DIAGNOSIS — R41.3 MEMORY LOSS: ICD-10-CM

## 2021-11-23 LAB
CYTO UR: NORMAL
LAB AP CASE REPORT: NORMAL
LAB AP CLINICAL INFORMATION: NORMAL
LAB AP DIAGNOSIS COMMENT: NORMAL
PATH REPORT.FINAL DX SPEC: NORMAL
PATH REPORT.GROSS SPEC: NORMAL

## 2021-11-23 PROCEDURE — 99215 OFFICE O/P EST HI 40 MIN: CPT | Performed by: INTERNAL MEDICINE

## 2021-11-23 RX ORDER — LORAZEPAM 0.5 MG/1
0.5 TABLET ORAL 2 TIMES DAILY PRN
Qty: 30 TABLET | Refills: 0 | Status: SHIPPED | OUTPATIENT
Start: 2021-11-23 | End: 2021-12-03 | Stop reason: SDUPTHER

## 2021-11-23 RX ORDER — HYDROXYZINE 50 MG/1
50 TABLET, FILM COATED ORAL NIGHTLY PRN
Qty: 90 TABLET | Refills: 1 | Status: SHIPPED | OUTPATIENT
Start: 2021-11-23 | End: 2021-12-15 | Stop reason: SDUPTHER

## 2021-11-23 NOTE — ED PROVIDER NOTES
"Subjective   61-year-old male presents for evaluation of confusion and memory loss.  Of note, the patient was initially quite anxious and upset/tearful so the majority of his history was initially obtained from his sister at bedside.  She tells me that the patient lives by himself.  He trains horses.  She went to check on him this morning per his request and found him to be extremely anxious and tearful.  He appeared confused and did not recall any of the events that occurred last night or this morning.  She showed me a video of her talking to him, and he was quite tearful at the time.  He felt like his heart was racing at the time.  She was quite concerned about his memory loss this morning and subsequently brought him to the emergency department to be evaluated.  The patient is followed by Dr. Nicole of neurology.  She states that his primary care physician told him that he has \"Alzheimer's\" but that Dr. Solorzano does not believe this to be true.  She notes that there were recently several changes made to his medication regimen and wonders if this could be contributing to his current symptoms and situation as well.          Review of Systems   Cardiovascular: Positive for palpitations.   Psychiatric/Behavioral: Positive for confusion. The patient is nervous/anxious.    All other systems reviewed and are negative.      Past Medical History:   Diagnosis Date   • Arthritis    • Boston esophagus    • GERD (gastroesophageal reflux disease)    • Hypertension        No Known Allergies    Past Surgical History:   Procedure Laterality Date   • CARDIAC CATHETERIZATION N/A 8/23/2021    Procedure: Left Heart Cath;  Surgeon: Martínez Estrada MD;  Location: [facility] CATH INVASIVE LOCATION;  Service: Cardiology;  Laterality: N/A;   • ENDOSCOPY         Family History   Problem Relation Age of Onset   • Cancer Mother    • No Known Problems Father    • Diabetes Brother    • Cancer Maternal Grandmother    • Cancer Maternal " Grandfather    • Cancer Paternal Grandmother    • Cancer Paternal Grandfather        Social History     Socioeconomic History   • Marital status:    Tobacco Use   • Smoking status: Never Smoker   • Smokeless tobacco: Never Used   Vaping Use   • Vaping Use: Never used   Substance and Sexual Activity   • Alcohol use: No   • Drug use: Yes     Types: Marijuana   • Sexual activity: Defer           Objective   Physical Exam  Vitals and nursing note reviewed.   Constitutional:       General: He is not in acute distress.     Appearance: He is well-developed. He is not diaphoretic.      Comments: Anxious and tearful male   HENT:      Head: Normocephalic and atraumatic.   Eyes:      Extraocular Movements: Extraocular movements intact.      Pupils: Pupils are equal, round, and reactive to light.   Neck:      Vascular: No carotid bruit or JVD.      Comments: No meningeal signs or nuchal rigidity  Cardiovascular:      Rate and Rhythm: Normal rate and regular rhythm.      Heart sounds: Normal heart sounds. No murmur heard.  No friction rub. No gallop.    Pulmonary:      Effort: Pulmonary effort is normal. No respiratory distress.      Breath sounds: Normal breath sounds. No wheezing or rales.   Abdominal:      General: Bowel sounds are normal. There is no distension.      Palpations: Abdomen is soft. There is no mass.      Tenderness: There is no abdominal tenderness. There is no guarding.   Musculoskeletal:         General: Normal range of motion.      Cervical back: Normal range of motion.   Skin:     General: Skin is warm and dry.      Coloration: Skin is not pale.      Findings: No erythema or rash.   Neurological:      Mental Status: He is alert and oriented to person, place, and time.      Comments: Awake, alert, and oriented x3, clear and fluent speech, no ataxia or dysmetria, normal gait, neurovascularly intact distally in all fours with bounding distal pulses and normal sensation, 5 out of 5 strength in all fours,  no cranial nerve deficits noted with cranial nerves II through XII grossly intact   Psychiatric:         Thought Content: Thought content normal.         Judgment: Judgment normal.      Comments: Anxious         Procedures           ED Course  ED Course as of 11/22/21 2117 Mon Nov 22, 2021 2114 61-year-old male presents for evaluation of confusion and memory loss.  On arrival to the ED, the patient was initially very anxious appearing and tearful.  The majority of his history was initially obtained from his sister at bedside, but I was able to build a good rapport with the patient and got him to calm down.  He has unfortunately lost several family members over the past few years to cancer, and this has been extremely difficult on him emotionally.  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  He is unsure as to what caused his amnestic episode this morning and denies this ever occurring before in the past.  Currently, once the patient was able to calm down, I was able to do a full neurological exam on him and he currently has no neurological deficits at all.  Ativan given for anxiolysis.  Labs and imaging are unremarkable. [DD]   2116 I had a long discussion with the patient and his sister regarding goals of care and inpatient versus outpatient management.  We discussed the high likelihood of his symptoms this morning being triggered by an anxiety attack or panic attack.  We also discussed the possibility of transient global amnesia.  I offered the patient admission, but he politely declined and would prefer to go home.  He recently had a normal MRI brain and EEG.  He would prefer to follow-up with Dr. Nicole as an outpatient, and I feel that this is reasonable.  He will follow-up within the next week.  Agreeable with plan and given appropriate strict return precautions.  Doubt emergent central process at this time. [DD]      ED Course User Index  [DD] Dat Orlando MD                                   Recent  Results (from the past 24 hour(s))   ECG 12 Lead    Collection Time: 11/22/21  1:59 PM   Result Value Ref Range    QT Interval 390 ms    QTC Interval 432 ms   POC Glucose Once    Collection Time: 11/22/21  2:05 PM    Specimen: Blood   Result Value Ref Range    Glucose 117 70 - 130 mg/dL   Troponin    Collection Time: 11/22/21  2:25 PM    Specimen: Blood   Result Value Ref Range    Troponin T <0.010 0.000 - 0.030 ng/mL   Comprehensive Metabolic Panel    Collection Time: 11/22/21  2:25 PM    Specimen: Blood   Result Value Ref Range    Glucose 156 (H) 65 - 99 mg/dL    BUN 9 8 - 23 mg/dL    Creatinine 0.86 0.76 - 1.27 mg/dL    Sodium 140 136 - 145 mmol/L    Potassium 4.0 3.5 - 5.2 mmol/L    Chloride 104 98 - 107 mmol/L    CO2 21.0 (L) 22.0 - 29.0 mmol/L    Calcium 9.7 8.6 - 10.5 mg/dL    Total Protein 7.3 6.0 - 8.5 g/dL    Albumin 4.70 3.50 - 5.20 g/dL    ALT (SGPT) 20 1 - 41 U/L    AST (SGOT) 23 1 - 40 U/L    Alkaline Phosphatase 79 39 - 117 U/L    Total Bilirubin 0.5 0.0 - 1.2 mg/dL    eGFR Non African Amer 91 >60 mL/min/1.73    Globulin 2.6 gm/dL    A/G Ratio 1.8 g/dL    BUN/Creatinine Ratio 10.5 7.0 - 25.0    Anion Gap 15.0 5.0 - 15.0 mmol/L   Lipase    Collection Time: 11/22/21  2:25 PM    Specimen: Blood   Result Value Ref Range    Lipase 19 13 - 60 U/L   BNP    Collection Time: 11/22/21  2:25 PM    Specimen: Blood   Result Value Ref Range    proBNP 217.9 0.0 - 900.0 pg/mL   Green Top (Gel)    Collection Time: 11/22/21  2:25 PM   Result Value Ref Range    Extra Tube Hold for add-ons.    Lavender Top    Collection Time: 11/22/21  2:25 PM   Result Value Ref Range    Extra Tube hold for add-on    Gold Top - SST    Collection Time: 11/22/21  2:25 PM   Result Value Ref Range    Extra Tube Hold for add-ons.    Gray Top    Collection Time: 11/22/21  2:25 PM   Result Value Ref Range    Extra Tube Hold for add-ons.    Light Blue Top    Collection Time: 11/22/21  2:25 PM   Result Value Ref Range    Extra Tube hold for  add-on    CBC Auto Differential    Collection Time: 11/22/21  2:25 PM    Specimen: Blood   Result Value Ref Range    WBC 8.64 3.40 - 10.80 10*3/mm3    RBC 4.89 4.14 - 5.80 10*6/mm3    Hemoglobin 14.9 13.0 - 17.7 g/dL    Hematocrit 43.9 37.5 - 51.0 %    MCV 89.8 79.0 - 97.0 fL    MCH 30.5 26.6 - 33.0 pg    MCHC 33.9 31.5 - 35.7 g/dL    RDW 13.0 12.3 - 15.4 %    RDW-SD 42.5 37.0 - 54.0 fl    MPV 10.2 6.0 - 12.0 fL    Platelets 244 140 - 450 10*3/mm3    Neutrophil % 75.6 42.7 - 76.0 %    Lymphocyte % 17.1 (L) 19.6 - 45.3 %    Monocyte % 5.3 5.0 - 12.0 %    Eosinophil % 1.7 0.3 - 6.2 %    Basophil % 0.1 0.0 - 1.5 %    Immature Grans % 0.2 0.0 - 0.5 %    Neutrophils, Absolute 6.52 1.70 - 7.00 10*3/mm3    Lymphocytes, Absolute 1.48 0.70 - 3.10 10*3/mm3    Monocytes, Absolute 0.46 0.10 - 0.90 10*3/mm3    Eosinophils, Absolute 0.15 0.00 - 0.40 10*3/mm3    Basophils, Absolute 0.01 0.00 - 0.20 10*3/mm3    Immature Grans, Absolute 0.02 0.00 - 0.05 10*3/mm3    nRBC 0.0 0.0 - 0.2 /100 WBC   Scan Slide    Collection Time: 11/22/21  2:25 PM    Specimen: Blood   Result Value Ref Range    Ovalocytes Slight/1+ None Seen    WBC Morphology Normal Normal    Platelet Morphology Normal Normal   Troponin    Collection Time: 11/22/21  3:57 PM    Specimen: Blood   Result Value Ref Range    Troponin T <0.010 0.000 - 0.030 ng/mL   ECG 12 Lead    Collection Time: 11/22/21  4:21 PM   Result Value Ref Range    QT Interval 392 ms    QTC Interval 425 ms     Note: In addition to lab results from this visit, the labs listed above may include labs taken at another facility or during a different encounter within the last 24 hours. Please correlate lab times with ED admission and discharge times for further clarification of the services performed during this visit.    CT Head Without Contrast   Final Result       No acute intracranial findings.           This report was finalized on 11/22/2021 3:36 PM by Hector Moseley MD.          XR Chest 1 View  "  Final Result   No evidence of active chest disease.       D: 11/22/2021   E: 11/22/2021       This report was finalized on 11/22/2021 8:53 PM by Dr. Jose M Dugan MD.            Vitals:    11/22/21 1349 11/22/21 1354 11/22/21 1431 11/22/21 1529   BP: 117/80 131/95 127/87 135/97   Pulse: 72 89 69 72   Resp: 14 14     Temp: 98.5 °F (36.9 °C) 97.8 °F (36.6 °C)     SpO2: 92% 100% 96% 94%   Weight:  135 kg (297 lb)     Height:  170.2 cm (67\")       Medications   aspirin chewable tablet 324 mg (324 mg Oral Given 11/22/21 1428)   LORazepam (ATIVAN) injection 0.5 mg (0.5 mg Intravenous Given 11/22/21 1519)     ECG/EMG Results (last 24 hours)     ** No results found for the last 24 hours. **        ECG 12 Lead   Final Result   Test Reason : SECOND SET TROP   Blood Pressure :   */*   mmHG   Vent. Rate :  71 BPM     Atrial Rate :  71 BPM      P-R Int : 176 ms          QRS Dur :  94 ms       QT Int : 392 ms       P-R-T Axes :  30  -2  26 degrees      QTc Int : 425 ms      Normal sinus rhythm   Normal ECG   Confirmed by MD Orlando Michael ([id number]) on 11/22/2021 8:41:17 PM      Referred By: CHENTE           Confirmed By: Martínez Orlando MD      ECG 12 Lead   Final Result   Test Reason : AMS   Blood Pressure :   */*   mmHG   Vent. Rate :  74 BPM     Atrial Rate : 277 BPM      P-R Int :   * ms          QRS Dur :  98 ms       QT Int : 390 ms       P-R-T Axes :   *   1  61 degrees      QTc Int : 432 ms      Poor data quality   Normal sinus rhythm   Possible Inferior infarct , age undetermined   Cannot rule out Anteroseptal infarct , age undetermined   Abnormal ECG   Confirmed by MD Orlando Michael ([id number]) on 11/22/2021 8:38:26 PM      Referred By: CHENTE           Confirmed By: Martínez Orlando MD          Recent Results (from the past 24 hour(s))   ECG 12 Lead    Collection Time: 11/22/21  1:59 PM   Result Value Ref Range    QT Interval 390 ms    QTC Interval 432 ms   POC Glucose Once    Collection Time: 11/22/21  2:05 PM    Specimen: Blood   Result " Value Ref Range    Glucose 117 70 - 130 mg/dL   Troponin    Collection Time: 11/22/21  2:25 PM    Specimen: Blood   Result Value Ref Range    Troponin T <0.010 0.000 - 0.030 ng/mL   Comprehensive Metabolic Panel    Collection Time: 11/22/21  2:25 PM    Specimen: Blood   Result Value Ref Range    Glucose 156 (H) 65 - 99 mg/dL    BUN 9 8 - 23 mg/dL    Creatinine 0.86 0.76 - 1.27 mg/dL    Sodium 140 136 - 145 mmol/L    Potassium 4.0 3.5 - 5.2 mmol/L    Chloride 104 98 - 107 mmol/L    CO2 21.0 (L) 22.0 - 29.0 mmol/L    Calcium 9.7 8.6 - 10.5 mg/dL    Total Protein 7.3 6.0 - 8.5 g/dL    Albumin 4.70 3.50 - 5.20 g/dL    ALT (SGPT) 20 1 - 41 U/L    AST (SGOT) 23 1 - 40 U/L    Alkaline Phosphatase 79 39 - 117 U/L    Total Bilirubin 0.5 0.0 - 1.2 mg/dL    eGFR Non African Amer 91 >60 mL/min/1.73    Globulin 2.6 gm/dL    A/G Ratio 1.8 g/dL    BUN/Creatinine Ratio 10.5 7.0 - 25.0    Anion Gap 15.0 5.0 - 15.0 mmol/L   Lipase    Collection Time: 11/22/21  2:25 PM    Specimen: Blood   Result Value Ref Range    Lipase 19 13 - 60 U/L   BNP    Collection Time: 11/22/21  2:25 PM    Specimen: Blood   Result Value Ref Range    proBNP 217.9 0.0 - 900.0 pg/mL   Green Top (Gel)    Collection Time: 11/22/21  2:25 PM   Result Value Ref Range    Extra Tube Hold for add-ons.    Lavender Top    Collection Time: 11/22/21  2:25 PM   Result Value Ref Range    Extra Tube hold for add-on    Gold Top - SST    Collection Time: 11/22/21  2:25 PM   Result Value Ref Range    Extra Tube Hold for add-ons.    Gray Top    Collection Time: 11/22/21  2:25 PM   Result Value Ref Range    Extra Tube Hold for add-ons.    Light Blue Top    Collection Time: 11/22/21  2:25 PM   Result Value Ref Range    Extra Tube hold for add-on    CBC Auto Differential    Collection Time: 11/22/21  2:25 PM    Specimen: Blood   Result Value Ref Range    WBC 8.64 3.40 - 10.80 10*3/mm3    RBC 4.89 4.14 - 5.80 10*6/mm3    Hemoglobin 14.9 13.0 - 17.7 g/dL    Hematocrit 43.9 37.5 - 51.0  %    MCV 89.8 79.0 - 97.0 fL    MCH 30.5 26.6 - 33.0 pg    MCHC 33.9 31.5 - 35.7 g/dL    RDW 13.0 12.3 - 15.4 %    RDW-SD 42.5 37.0 - 54.0 fl    MPV 10.2 6.0 - 12.0 fL    Platelets 244 140 - 450 10*3/mm3    Neutrophil % 75.6 42.7 - 76.0 %    Lymphocyte % 17.1 (L) 19.6 - 45.3 %    Monocyte % 5.3 5.0 - 12.0 %    Eosinophil % 1.7 0.3 - 6.2 %    Basophil % 0.1 0.0 - 1.5 %    Immature Grans % 0.2 0.0 - 0.5 %    Neutrophils, Absolute 6.52 1.70 - 7.00 10*3/mm3    Lymphocytes, Absolute 1.48 0.70 - 3.10 10*3/mm3    Monocytes, Absolute 0.46 0.10 - 0.90 10*3/mm3    Eosinophils, Absolute 0.15 0.00 - 0.40 10*3/mm3    Basophils, Absolute 0.01 0.00 - 0.20 10*3/mm3    Immature Grans, Absolute 0.02 0.00 - 0.05 10*3/mm3    nRBC 0.0 0.0 - 0.2 /100 WBC   Scan Slide    Collection Time: 11/22/21  2:25 PM    Specimen: Blood   Result Value Ref Range    Ovalocytes Slight/1+ None Seen    WBC Morphology Normal Normal    Platelet Morphology Normal Normal   Troponin    Collection Time: 11/22/21  3:57 PM    Specimen: Blood   Result Value Ref Range    Troponin T <0.010 0.000 - 0.030 ng/mL   ECG 12 Lead    Collection Time: 11/22/21  4:21 PM   Result Value Ref Range    QT Interval 392 ms    QTC Interval 425 ms     Note: In addition to lab results from this visit, the labs listed above may include labs taken at another facility or during a different encounter within the last 24 hours. Please correlate lab times with ED admission and discharge times for further clarification of the services performed during this visit.    CT Head Without Contrast   Final Result       No acute intracranial findings.           This report was finalized on 11/22/2021 3:36 PM by Hector Moseley MD.          XR Chest 1 View   Final Result   No evidence of active chest disease.       D: 11/22/2021   E: 11/22/2021       This report was finalized on 11/22/2021 8:53 PM by Dr. Jose M Dugan MD.            Vitals:    11/22/21 1349 11/22/21 1354 11/22/21 1431 11/22/21 1529   BP:  "117/80 131/95 127/87 135/97   Pulse: 72 89 69 72   Resp: 14 14     Temp: 98.5 °F (36.9 °C) 97.8 °F (36.6 °C)     SpO2: 92% 100% 96% 94%   Weight:  135 kg (297 lb)     Height:  170.2 cm (67\")       Medications   aspirin chewable tablet 324 mg (324 mg Oral Given 11/22/21 1428)   LORazepam (ATIVAN) injection 0.5 mg (0.5 mg Intravenous Given 11/22/21 1519)     ECG/EMG Results (last 24 hours)     ** No results found for the last 24 hours. **        ECG 12 Lead   Final Result   Test Reason : SECOND SET TROP   Blood Pressure :   */*   mmHG   Vent. Rate :  71 BPM     Atrial Rate :  71 BPM      P-R Int : 176 ms          QRS Dur :  94 ms       QT Int : 392 ms       P-R-T Axes :  30  -2  26 degrees      QTc Int : 425 ms      Normal sinus rhythm   Normal ECG   Confirmed by MD Orlando Michael ([id number]) on 11/22/2021 8:41:17 PM      Referred By: CHENTE           Confirmed By: Martínez Orlando MD      ECG 12 Lead   Final Result   Test Reason : AMS   Blood Pressure :   */*   mmHG   Vent. Rate :  74 BPM     Atrial Rate : 277 BPM      P-R Int :   * ms          QRS Dur :  98 ms       QT Int : 390 ms       P-R-T Axes :   *   1  61 degrees      QTc Int : 432 ms      Poor data quality   Normal sinus rhythm   Possible Inferior infarct , age undetermined   Cannot rule out Anteroseptal infarct , age undetermined   Abnormal ECG   Confirmed by MD Orlando Michael ([id number]) on 11/22/2021 8:38:26 PM      Referred By: CHENTE           Confirmed By: Martínez Orlando MD                  [facility]    Final diagnoses:   Confusion   Amnesia   Palpitations   Anxiety       ED Disposition  ED Disposition     ED Disposition Condition Comment    Discharge Stable           Surendra Nicole MD  [address] [address]  [phone]    In 1 week           Medication List      No changes were made to your prescriptions during this visit.          aDt Orlando MD  11/22/21 2118    "

## 2021-11-23 NOTE — PROGRESS NOTES
Chief Complaint   Patient presents with   • Anxiety     [facility] ED f/u        HPI:  Félix Brian is a 61 y.o. male who presents today for ER follow-up panic attack.  He is requesting refill on Ativan.  He has stopped taking all medications as he suspects this is contributing to memory loss.    ROS:  Constitutional: no fevers, night sweats or unexplained weight loss  Eyes: no vision changes  ENT: no runny nose, ear pain, sore throat  Cardio: no chest pain, palpitations  Pulm: no shortness of breath, wheezing, or cough  GI: no abdominal pain or changes in bowel movements  : no difficulty urinating  MSK: no difficulty ambulating, no joint pain  Neuro: no weakness, dizziness or headache  Psych: no trouble sleeping  Endo: no change in appetite      Past Medical History:   Diagnosis Date   • Arthritis    • Boston esophagus    • GERD (gastroesophageal reflux disease)    • Hypertension       Family History   Problem Relation Age of Onset   • Cancer Mother    • No Known Problems Father    • Diabetes Brother    • Cancer Maternal Grandmother    • Cancer Maternal Grandfather    • Cancer Paternal Grandmother    • Cancer Paternal Grandfather       Social History     Socioeconomic History   • Marital status:    Tobacco Use   • Smoking status: Never Smoker   • Smokeless tobacco: Never Used   Vaping Use   • Vaping Use: Never used   Substance and Sexual Activity   • Alcohol use: No   • Drug use: Yes     Types: Marijuana   • Sexual activity: Defer      No Known Allergies     There is no immunization history on file for this patient.     PE:  Vitals:    11/23/21 1400   BP: 120/82   Pulse: 81   SpO2: 98%      Body mass index is 46.52 kg/m².    Gen Appearance: NAD  HEENT: Normocephalic, PERRLA, no thyromegaly, trache midline  Heart: RRR, normal S1 and S2, no murmur  Lungs: CTA b/l, no wheezing, no crackles  Abdomen: Soft, non-tender, non-distended, no guarding and BSx4  MSK: Moves all extremities well, normal gait, no  peripheral edema  Pulses: Palpable and equal b/l  Lymph nodes: No palpable lymphadenopathy   Neuro: No focal deficits      Current Outpatient Medications   Medication Sig Dispense Refill   • omeprazole (priLOSEC) 40 MG capsule Take 40 mg by mouth Daily. OTC     • hydrOXYzine (ATARAX) 50 MG tablet Take 1 tablet by mouth At Night As Needed for Anxiety. 90 tablet 1   • LORazepam (Ativan) 0.5 MG tablet Take 1 tablet by mouth 2 (Two) Times a Day As Needed for Anxiety. 30 tablet 0     No current facility-administered medications for this visit.      Counseling was given to patient and family for the following topics: instructions for management, impressions, risks and benefits of treatment options and importance of treatment compliance . Total time of the encounter was 40 minutes and 21 minutes was spent face to face counseling.    Long discussion with patient and his sister.  He has discontinued medication.  He would like to trial treatment of anxiety with hydroxyzine and as needed Ativan.  I did discuss high risk nature of benzodiazepine use.  He will use Ativan only for panic attacks.  He would benefit from SSRI therapy.  We will see back in 2 weeks to discuss.  Hold off on starting any new medication at this time.  We will need to monitor blood pressure closely that he has stopped taking both of his antihypertensives.  Well-controlled BP today however.    Reviewed recent ER results and notes.    Diagnoses and all orders for this visit:    1. Generalized anxiety disorder (Primary)  -     hydrOXYzine (ATARAX) 50 MG tablet; Take 1 tablet by mouth At Night As Needed for Anxiety.  Dispense: 90 tablet; Refill: 1  -     LORazepam (Ativan) 0.5 MG tablet; Take 1 tablet by mouth 2 (Two) Times a Day As Needed for Anxiety.  Dispense: 30 tablet; Refill: 0    2. Memory loss    3. Primary hypertension         Return in about 2 weeks (around 12/7/2021) for anxiety, htn.     Dictated Utilizing Dragon Dictation    Please note that  portions of this note were completed with a voice recognition program.    Part of this note may be an electronic transcription/translation of spoken language to printed text using the Dragon Dictation System.

## 2021-11-23 NOTE — TELEPHONE ENCOUNTER
"Called and spoke to pt. Having extreme anxiety, stated \"was at ER, would like to have different med and that ativan worked but that trazodone should be outlawed\" Explained that that medication is a controlled med and pt needs appt to discuss. Confirmed pt would be at his 145pm appt. Voiced understanding. Had no further questions/concerns at this time.   "

## 2021-11-23 NOTE — TELEPHONE ENCOUNTER
Caller: SARAH CONTEH    Relationship: Emergency Contact    Best call back number: [phone]     What medication are you requesting: LORazepam (ATIVAN)    What are your current symptoms: ANXIETY    Have you had these symptoms before:    [x] Yes  [] No    Have you been treated for these symptoms before:   [x] Yes  [] No    If a prescription is needed, what is your preferred pharmacy and phone number: [facility] [address] [phone] [facility] [phone] FX     Additional notes:  WAS GIVEN TO HIM IN ER TO TREAT SYMPTOM AND IT WORKED. HAS ONLY ENOUGH TO LAST HIM UNTIL TOMORROW MORNING

## 2021-12-02 ENCOUNTER — TELEPHONE (OUTPATIENT)
Dept: FAMILY MEDICINE CLINIC | Facility: CLINIC | Age: 61
End: 2021-12-02

## 2021-12-02 DIAGNOSIS — F41.1 GENERALIZED ANXIETY DISORDER: ICD-10-CM

## 2021-12-02 DIAGNOSIS — F41.0 PANIC ATTACKS: Primary | ICD-10-CM

## 2021-12-02 NOTE — TELEPHONE ENCOUNTER
I will get him referred to psychology asap, he can take an extra ativan today if panic attacks are severe

## 2021-12-02 NOTE — TELEPHONE ENCOUNTER
Contacted patient's sister to discuss further. She states that he has been taking the Ativan and not using hydroxyzine regularly. I discussed PCP office note to advise him to take medicine as prescribed. I also mention that he need to continue to follow up with González    Patient sister will call to make an appt with Caren Alvarez, Neurology and patient is requesting a referral to speak with a counselor. He thinks this would help manage his panic attacks.

## 2021-12-02 NOTE — TELEPHONE ENCOUNTER
Caller: SARAH CONTEH    Relationship: Emergency Contact    Best call back number: [phone]    What was the call regarding:   PATIENT'S (SISTER) SARAH STATED THAT PATIENT IS TAKING MEDICATION   LORazepam (Ativan) 0.5 MG tablet  BUT TODAY 12/02/2021 PATIENT IS HAVING REALLY BAD ANXIETY ATTACKS WITH SWEATS AND WOULD LIKE A CALL BACK REGARDING WHAT HE COULD DO TO HELP WITH THE ATTACKS     Do you require a callback: YES

## 2021-12-03 DIAGNOSIS — F41.1 GENERALIZED ANXIETY DISORDER: ICD-10-CM

## 2021-12-03 RX ORDER — LORAZEPAM 0.5 MG/1
0.5 TABLET ORAL 2 TIMES DAILY PRN
Qty: 30 TABLET | Refills: 0 | Status: SHIPPED | OUTPATIENT
Start: 2021-12-03 | End: 2021-12-15 | Stop reason: SINTOL

## 2021-12-03 NOTE — TELEPHONE ENCOUNTER
Rx Refill Note  Requested Prescriptions     Pending Prescriptions Disp Refills   • LORazepam (Ativan) 0.5 MG tablet 30 tablet 0     Sig: Take 1 tablet by mouth 2 (Two) Times a Day As Needed for Anxiety.      Last office visit with prescribing clinician: 11/23/2021      Next office visit with prescribing clinician: 12/6/2021   3}  Jessica Martinez MA  12/03/21, 11:30 EST    Last fill: 11/23/2021

## 2021-12-03 NOTE — TELEPHONE ENCOUNTER
Caller: SARAH CONTEH    Relationship: Emergency Contact    Best call back number: [phone]    Requested Prescriptions:   Requested Prescriptions     Pending Prescriptions Disp Refills   • LORazepam (Ativan) 0.5 MG tablet 30 tablet 0     Sig: Take 1 tablet by mouth 2 (Two) Times a Day As Needed for Anxiety.        Pharmacy where request should be sent: [facility] [address] [place] [address] [phone]  - [phone] FX     Additional details provided by patient: PATIENT NEEDS THIS BEFORE THE WEEKEND. HE HAS HAD TO TAKE MORE TABLETS THAN NORMAL DUE TO EXTREME LONG LASTING ANXIETY/PANIC ATTACKS WITHIN THE LAST 24-48 HOURS      Does the patient have less than a 3 day supply:  [x] Yes  [] No    Glen Alvarez Rep   12/03/21 11:21 EST

## 2021-12-06 ENCOUNTER — OFFICE VISIT (OUTPATIENT)
Dept: FAMILY MEDICINE CLINIC | Facility: CLINIC | Age: 61
End: 2021-12-06

## 2021-12-06 VITALS
SYSTOLIC BLOOD PRESSURE: 124 MMHG | HEART RATE: 75 BPM | DIASTOLIC BLOOD PRESSURE: 78 MMHG | WEIGHT: 226.8 LBS | OXYGEN SATURATION: 98 % | BODY MASS INDEX: 35.6 KG/M2 | HEIGHT: 67 IN

## 2021-12-06 DIAGNOSIS — Z51.81 THERAPEUTIC DRUG MONITORING: Primary | ICD-10-CM

## 2021-12-06 DIAGNOSIS — F41.9 ANXIETY: ICD-10-CM

## 2021-12-06 DIAGNOSIS — F41.0 PANIC ATTACKS: ICD-10-CM

## 2021-12-06 PROCEDURE — 99215 OFFICE O/P EST HI 40 MIN: CPT | Performed by: NURSE PRACTITIONER

## 2021-12-06 RX ORDER — BUSPIRONE HYDROCHLORIDE 5 MG/1
5 TABLET ORAL 3 TIMES DAILY
Qty: 90 TABLET | Refills: 1 | Status: SHIPPED | OUTPATIENT
Start: 2021-12-06 | End: 2021-12-15 | Stop reason: SINTOL

## 2021-12-06 NOTE — PROGRESS NOTES
Subjective   Félix Brian is a 61 y.o. male.   Chief Complaint   Patient presents with   • Anxiety     f/u       History of Present Illness   Patient of Dr. Bolaños is here for follow up for anxiety; sister is with him to help with history; lost wife, mother and uncle within 7 months of each other 3 years ago. Then this Spring, had 2 cats die, and an uncle and friend that was like a father  this Spring, so since March has been having a really hard time.    Wakes up sometimes and does not know where he is , is forgetful; had hallucinations with trazodone, resolved since stopping med.   Paces the floor at night, afraid to go to sleep, because he wakes up in a panic attack sometimes. His sister has been staying with him to make sure he is safe. Denies thoughts of hurting self or others.   Has seen neuro, normal CT and MRI of heads. Normal EEG, prescribed meds, states symptoms worsened, was on 11 meds a day, stopped taking them, admitted to [facility] for 5 days in August for chest pain.   Saw Dr. Nicole in Sept; started on depakote for headaches, patient no longer taking this. Started on Aricept by Dr. Bolaños at end of Sept., no longer taking; Caren Alvarez , neuro, referred patient to neuropsychology and speech therapy for evaluation, this has not been completed yet.  Was seen in ER for anxiety and panic attack 21, prescribed Ativan. It was after this ER visit that patient stopped taking the medications because he thought they were contributing to his memory loss. Patient states he does occ smoke marijuana and it helps to calm him.   The following portions of the patient's history were reviewed and updated as appropriate: allergies, current medications, past family history, past medical history, past social history, past surgical history and problem list.    Review of Systems   Constitutional: Positive for fatigue.   Respiratory: Positive for shortness of breath (with exertion and anxiety).    Cardiovascular:  "Positive for palpitations (with anxiety). Negative for chest pain.   Psychiatric/Behavioral: Positive for agitation, confusion, dysphoric mood and sleep disturbance. Negative for hallucinations (resolved when stopped trazodone), self-injury and suicidal ideas. The patient is nervous/anxious.        Objective   Physical Exam  Vitals reviewed.   Constitutional:       General: He is not in acute distress.     Appearance: Normal appearance. He is not ill-appearing, toxic-appearing or diaphoretic.   HENT:      Head: Normocephalic and atraumatic.   Cardiovascular:      Rate and Rhythm: Normal rate.   Pulmonary:      Effort: Pulmonary effort is normal. No respiratory distress.   Neurological:      Mental Status: He is alert and oriented to person, place, and time.   Psychiatric:         Mood and Affect: Mood is anxious. Affect is tearful.         Speech: Speech normal.         Behavior: Behavior normal.         Thought Content: Thought content normal.         Cognition and Memory: Cognition normal.       /78   Pulse 75   Ht 170.2 cm (67.01\")   Wt 103 kg (226 lb 12.8 oz)   SpO2 98%   BMI 35.51 kg/m²     Assessment/Plan   Diagnoses and all orders for this visit:    1. Therapeutic drug monitoring (Primary)  -     Compliance Drug Analysis, Ur - Urine, Clean Catch; Future  -     Compliance Drug Analysis, Ur - Urine, Clean Catch    2. Anxiety  -     busPIRone (BUSPAR) 5 MG tablet; Take 1 tablet by mouth 3 (Three) Times a Day.  Dispense: 90 tablet; Refill: 1  -     Ambulatory Referral to Psychiatry    3. Panic attacks  -     busPIRone (BUSPAR) 5 MG tablet; Take 1 tablet by mouth 3 (Three) Times a Day.  Dispense: 90 tablet; Refill: 1  -     Ambulatory Referral to Psychiatry        LEONOR 7 score 21  PHQ-9 Depression Screening  Little interest or pleasure in doing things? 3   Feeling down, depressed, or hopeless? 3   Trouble falling or staying asleep, or sleeping too much? 3   Feeling tired or having little energy? 3   Poor " appetite or overeating? 0   Feeling bad about yourself - or that you are a failure or have let yourself or your family down? 0   Trouble concentrating on things, such as reading the newspaper or watching television? 0   Moving or speaking so slowly that other people could have noticed? Or the opposite - being so fidgety or restless that you have been moving around a lot more than usual? 0   Thoughts that you would be better off dead, or of hurting yourself in some way? 0   PHQ-9 Total Score 12   If you checked off any problems, how difficult have these problems made it for you to do your work, take care of things at home, or get along with other people? Somewhat difficult     This patient is on a controlled substance which improves symptoms/quality of life and is aware of the risks, benefits and possible side-effects current treatment. The patient denies any medication side-effects at this time. A controlled substance agreement will be obtained or is currently on file. We reviewed required monitoring for controlled substances including but not limited to quarterly follow-up visits, annual depression screening, and urine drug screens to which the patient is agreeable. A ERICK report has been or shortly will be reviewed. There are no signs of deviation or misuse.   Dr. Bolaños prescribed Ativan, controlled substance agreement and UDS collected today.   Patient has not been contacted to schedule with Behavioral Health, referred to psychiatry for in person visits, also recommended [facility] ER if he needs psychiatric evaluation emergently.  I had a long discussion about his mental health, will try buspirone, this worked well for his grandmother per his sister. He will notify office if he has adverse effects of the medication, otherwise give it time to be effective, may continue Ativan as needed, but goal will be to wean off or use only rarely. Patient and his sister verbalized understanding, will follow up with Dr. Bolaños in  2 weeks. Also cautioned patient about marijuana use, consider possibility that it was laced with some other drug since his anxiety has worsened   I spent a total of 67 minutes before, during and after the visit reviewing records, updating history and care gaps, educating the patient about his/her condition, ordering labs and prescriptions.

## 2021-12-06 NOTE — PATIENT INSTRUCTIONS
Managing Anxiety, Adult  After being diagnosed with an anxiety disorder, you may be relieved to know why you have felt or behaved a certain way. You may also feel overwhelmed about the treatment ahead and what it will mean for your life. With care and support, you can manage this condition and recover from it.  How to manage lifestyle changes  Managing stress and anxiety    Stress is your body's reaction to life changes and events, both good and bad. Most stress will last just a few hours, but stress can be ongoing and can lead to more than just stress. Although stress can play a major role in anxiety, it is not the same as anxiety. Stress is usually caused by something external, such as a deadline, test, or competition. Stress normally passes after the triggering event has ended.   Anxiety is caused by something internal, such as imagining a terrible outcome or worrying that something will go wrong that will devastate you. Anxiety often does not go away even after the triggering event is over, and it can become long-term (chronic) worry. It is important to understand the differences between stress and anxiety and to manage your stress effectively so that it does not lead to an anxious response.  Talk with your health care provider or a counselor to learn more about reducing anxiety and stress. He or she may suggest tension reduction techniques, such as:  · Music therapy. This can include creating or listening to music that you enjoy and that inspires you.  · Mindfulness-based meditation. This involves being aware of your normal breaths while not trying to control your breathing. It can be done while sitting or walking.  · Centering prayer. This involves focusing on a word, phrase, or sacred image that means something to you and brings you peace.  · Deep breathing. To do this, expand your stomach and inhale slowly through your nose. Hold your breath for 3-5 seconds. Then exhale slowly, letting your stomach muscles  relax.  · Self-talk. This involves identifying thought patterns that lead to anxiety reactions and changing those patterns.  · Muscle relaxation. This involves tensing muscles and then relaxing them.  Choose a tension reduction technique that suits your lifestyle and personality. These techniques take time and practice. Set aside 5-15 minutes a day to do them. Therapists can offer counseling and training in these techniques. The training to help with anxiety may be covered by some insurance plans. Other things you can do to manage stress and anxiety include:  · Keeping a stress/anxiety diary. This can help you learn what triggers your reaction and then learn ways to manage your response.  · Thinking about how you react to certain situations. You may not be able to control everything, but you can control your response.  · Making time for activities that help you relax and not feeling guilty about spending your time in this way.  · Visual imagery and yoga can help you stay calm and relax.    Medicines  Medicines can help ease symptoms. Medicines for anxiety include:  · Anti-anxiety drugs.  · Antidepressants.  Medicines are often used as a primary treatment for anxiety disorder. Medicines will be prescribed by a health care provider. When used together, medicines, psychotherapy, and tension reduction techniques may be the most effective treatment.  Relationships  Relationships can play a big part in helping you recover. Try to spend more time connecting with trusted friends and family members. Consider going to couples counseling, taking family education classes, or going to family therapy. Therapy can help you and others better understand your condition.  How to recognize changes in your anxiety  Everyone responds differently to treatment for anxiety. Recovery from anxiety happens when symptoms decrease and stop interfering with your daily activities at home or work. This may mean that you will start to:  · Have  better concentration and focus. Worry will interfere less in your daily thinking.  · Sleep better.  · Be less irritable.  · Have more energy.  · Have improved memory.  It is important to recognize when your condition is getting worse. Contact your health care provider if your symptoms interfere with home or work and you feel like your condition is not improving.  Follow these instructions at home:  Activity  · Exercise. Most adults should do the following:  ? Exercise for at least 150 minutes each week. The exercise should increase your heart rate and make you sweat (moderate-intensity exercise).  ? Strengthening exercises at least twice a week.  · Get the right amount and quality of sleep. Most adults need 7-9 hours of sleep each night.  Lifestyle    · Eat a healthy diet that includes plenty of vegetables, fruits, whole grains, low-fat dairy products, and lean protein. Do not eat a lot of foods that are high in solid fats, added sugars, or salt.  · Make choices that simplify your life.  · Do not use any products that contain nicotine or tobacco, such as cigarettes, e-cigarettes, and chewing tobacco. If you need help quitting, ask your health care provider.  · Avoid caffeine, alcohol, and certain over-the-counter cold medicines. These may make you feel worse. Ask your pharmacist which medicines to avoid.    General instructions  · Take over-the-counter and prescription medicines only as told by your health care provider.  · Keep all follow-up visits as told by your health care provider. This is important.  Where to find support  You can get help and support from these sources:  · Self-help groups.  · Online and community organizations.  · A trusted spiritual leader.  · Couples counseling.  · Family education classes.  · Family therapy.  Where to find more information  You may find that joining a support group helps you deal with your anxiety. The following sources can help you locate counselors or support groups  "near you:  · Mental Health [place]: www.mentalhealthamerica.net  · Anxiety and Depression Association of [place] (ADAA): www.adaa.org  · National [place] on Mental Illness ([place]ISHORE): www.kishore.org  Contact a health care provider if you:  · Have a hard time staying focused or finishing daily tasks.  · Spend many hours a day feeling worried about everyday life.  · Become exhausted by worry.  · Start to have headaches, feel tense, or have nausea.  · Urinate more than normal.  · Have diarrhea.  Get help right away if you have:  · A racing heart and shortness of breath.  · Thoughts of hurting yourself or others.  If you ever feel like you may hurt yourself or others, or have thoughts about taking your own life, get help right away. You can go to your nearest emergency department or call:  · Your local emergency services (911 in the [place]  · A suicide crisis helpline, such as the National Suicide Prevention Lifeline at 1-598.376.9818. This is open 24 hours a day.  Summary  · Taking steps to learn and use tension reduction techniques can help calm you and help prevent triggering an anxiety reaction.  · When used together, medicines, psychotherapy, and tension reduction techniques may be the most effective treatment.  · Family, friends, and partners can play a big part in helping you recover from an anxiety disorder.  This information is not intended to replace advice given to you by your health care provider. Make sure you discuss any questions you have with your health care provider.  Document Revised: 05/19/2020 Document Reviewed: 05/19/2020  [employer] Patient Education © 2021 [employer]    https://www.nimh.nih.gov/health/topics/anxiety-disorders/index.shtml\">   Panic Attack  A panic attack is when you suddenly feel very afraid, uncomfortable, or nervous (anxious). A panic attack can happen when you are scared or for no reason.  A panic attack can feel like a serious problem. It can even feel like a heart attack or " stroke. See your doctor when you have a panic attack to make sure you do not have a serious problem.  Follow these instructions at home:    · Take medicines only as told by your doctor.  · If you feel worried or nervous, try not to have caffeine.  · Take good care of your health. To do this:  ? Eat healthy. Make sure to eat fresh fruits and vegetables, whole grains, lean meats, and low-fat dairy.  ? Get enough sleep. Try to sleep for 7-8 hours each night.  ? Exercise. Try to be active for 30 minutes 5 or more days a week.  ? Do not smoke. Talk to your doctor if you need help quitting.  ? Limit how much alcohol you drink:  § If you are a woman who is not pregnant: try not to have more than 1 drink a day.  § If you are a man: try not to have more than 2 drinks a day.  § One drink equals 12 oz of beer, 5 oz of wine, or 1½ oz of hard liquor.  · Keep all follow-up visits as told by your doctor. This is important.  Contact a doctor if:  · Your symptoms do not get better.  · Your symptoms get worse.  · You are not able to take your medicines as told.  Get help right away if:  · You have thoughts of hurting yourself or others.  · You have symptoms of a panic attack. Do not drive yourself to the hospital. Have someone else drive you or call an ambulance.  If you feel like you may hurt yourself or others, or have thoughts about taking your own life, get help right away. You can go to your nearest emergency department or call:  · Your local emergency services (911 in the [place]  · A suicide crisis helpline, such as the National Suicide Prevention Lifeline at 1-517.919.6667. This is open 24 hours a day.  Summary  · A panic attack is when you suddenly feel very afraid, uncomfortable, or nervous (anxious).  · See your doctor when you have a panic attack to make sure that you do not have another serious problem.  · If you feel like you may hurt yourself or others, get help right away by calling 911.  This information is not  intended to replace advice given to you by your health care provider. Make sure you discuss any questions you have with your health care provider.  Document Revised: 2021 Document Reviewed: 2021  [employer] Patient Education 2021 [employer]  Understanding Anxiety  This video describes anxiety and what can be done to manage it.  To view the content, go to this web address:  [email or web address]    This video will  on: 2023. If you need access to this video following this date, please reach out to the healthcare provider who assigned it to you.  This information is not intended to replace advice given to you by your health care provider. Make sure you discuss any questions you have with your health care provider.  [employer]’s editorial and clinical teams regularly review and update content to ensure it is up-to-date with changing practice standards and recognized medical guidelines.  Document Revised: 2021  [employer] Patient Education 2021 [employer] Inc.  Managing Anxiety  This video describes anxiety and what can be done to manage it.  To view the content, go to this web address:  [email or web address]    This video will  on: 2023. If you need access to this video following this date, please reach out to the healthcare provider who assigned it to you.  This information is not intended to replace advice given to you by your health care provider. Make sure you discuss any questions you have with your health care provider.  [employer]’s editorial and clinical teams regularly review and update content to ensure it is up-to-date with changing practice standards and recognized medical guidelines.  Document Revised: 02/10/2021  [employer] Patient Education 2021 [employer] Inc.

## 2021-12-08 ENCOUNTER — TELEPHONE (OUTPATIENT)
Dept: FAMILY MEDICINE CLINIC | Facility: CLINIC | Age: 61
End: 2021-12-08

## 2021-12-08 LAB — SPECIMEN STATUS: NORMAL

## 2021-12-08 NOTE — TELEPHONE ENCOUNTER
Called and spoke to pt's sister. Informed of providers orders. Voiced understanding. Stated will have pt try and if it doesn't work; they will make an appt. Had no further questions/concerns at this time.

## 2021-12-08 NOTE — TELEPHONE ENCOUNTER
Caller: SARAH CONTEH    Relationship: Emergency Contact    Best call back number: [phone]    What was the call regarding:   PATIENT'S (SISTER) SARAH STATED THAT PATIENT HAS NOT SLEPT IN TWO DAYS AND WOULD LIKE A CALL BACK REGARDING PATIENT'S INSOMNIA AND TO BE INFORMED ON WHAT PATIENT COULD TAKE TO HELP HIM SLEEP     Do you require a callback: YES

## 2021-12-15 ENCOUNTER — OFFICE VISIT (OUTPATIENT)
Dept: FAMILY MEDICINE CLINIC | Facility: CLINIC | Age: 61
End: 2021-12-15

## 2021-12-15 VITALS
HEART RATE: 76 BPM | BODY MASS INDEX: 35.94 KG/M2 | HEIGHT: 67 IN | OXYGEN SATURATION: 98 % | WEIGHT: 229 LBS | DIASTOLIC BLOOD PRESSURE: 88 MMHG | SYSTOLIC BLOOD PRESSURE: 118 MMHG

## 2021-12-15 DIAGNOSIS — G89.29 CHRONIC LOW BACK PAIN WITHOUT SCIATICA, UNSPECIFIED BACK PAIN LATERALITY: ICD-10-CM

## 2021-12-15 DIAGNOSIS — G47.00 INSOMNIA, UNSPECIFIED TYPE: Primary | ICD-10-CM

## 2021-12-15 DIAGNOSIS — F41.1 GENERALIZED ANXIETY DISORDER: ICD-10-CM

## 2021-12-15 DIAGNOSIS — M54.50 CHRONIC LOW BACK PAIN WITHOUT SCIATICA, UNSPECIFIED BACK PAIN LATERALITY: ICD-10-CM

## 2021-12-15 PROCEDURE — 99214 OFFICE O/P EST MOD 30 MIN: CPT | Performed by: INTERNAL MEDICINE

## 2021-12-15 RX ORDER — CYCLOBENZAPRINE HCL 10 MG
10 TABLET ORAL NIGHTLY PRN
Qty: 10 TABLET | Refills: 0 | Status: SHIPPED | OUTPATIENT
Start: 2021-12-15 | End: 2022-01-26

## 2021-12-15 RX ORDER — HYDROXYZINE 50 MG/1
50 TABLET, FILM COATED ORAL NIGHTLY PRN
Qty: 90 TABLET | Refills: 3 | Status: SHIPPED | OUTPATIENT
Start: 2021-12-15 | End: 2022-01-26

## 2021-12-15 RX ORDER — LORAZEPAM 0.5 MG/1
0.5 TABLET ORAL 2 TIMES DAILY PRN
Qty: 30 TABLET | Refills: 0
Start: 2021-12-15 | End: 2022-01-26

## 2021-12-15 NOTE — PROGRESS NOTES
Chief Complaint   Patient presents with   • Anxiety     Discuss medication changes       HPI:  Félix Brian is a 61 y.o. male who presents today for follow-up anxiety, insomnia and back pain.  He has had difficulties with essentially all serotonin medicines he is taking in the past.  He is trying to wean down on Ativan.  He is having a difficult time sleeping.    ROS:  Constitutional: no fevers, night sweats or unexplained weight loss  Eyes: no vision changes  ENT: no runny nose, ear pain, sore throat  Cardio: no chest pain, palpitations  Pulm: no shortness of breath, wheezing, or cough  GI: no abdominal pain or changes in bowel movements  : no difficulty urinating  MSK: no difficulty ambulating, no joint pain  Neuro: no weakness, dizziness or headache  Psych: no trouble sleeping  Endo: no change in appetite      Past Medical History:   Diagnosis Date   • Arthritis    • [place] esophagus    • GERD (gastroesophageal reflux disease)    • Hypertension       Family History   Problem Relation Age of Onset   • Cancer Mother    • No Known Problems Father    • Diabetes Brother    • Cancer Maternal Grandmother    • Cancer Maternal Grandfather    • Cancer Paternal Grandmother    • Cancer Paternal Grandfather       Social History     Socioeconomic History   • Marital status:    Tobacco Use   • Smoking status: Never Smoker   • Smokeless tobacco: Never Used   Vaping Use   • Vaping Use: Never used   Substance and Sexual Activity   • Alcohol use: No   • Drug use: Yes     Types: Marijuana   • Sexual activity: Defer      Allergies   Allergen Reactions   • Ativan [Lorazepam] Mental Status Change   • Buspar [Buspirone] Mental Status Change        There is no immunization history on file for this patient.     PE:  Vitals:    12/15/21 1302   BP: 118/88   Pulse: 76   SpO2: 98%      Body mass index is 35.86 kg/m².    Gen Appearance: NAD  HEENT: Normocephalic, PERRLA, no thyromegaly, trache midline  Heart: RRR, normal S1  and S2, no murmur  Lungs: CTA b/l, no wheezing, no crackles  Abdomen: Soft, non-tender, non-distended, no guarding and BSx4  MSK: Moves all extremities well, normal gait, no peripheral edema  Pulses: Palpable and equal b/l  Lymph nodes: No palpable lymphadenopathy   Neuro: No focal deficits      Current Outpatient Medications   Medication Sig Dispense Refill   • hydrOXYzine (ATARAX) 50 MG tablet Take 1 tablet by mouth At Night As Needed for Anxiety. 90 tablet 3   • LORazepam (Ativan) 0.5 MG tablet Take 1 tablet by mouth 2 (Two) Times a Day As Needed for Anxiety. 30 tablet 0   • omeprazole (priLOSEC) 40 MG capsule Take 40 mg by mouth Daily. OTC     • cyclobenzaprine (FLEXERIL) 10 MG tablet Take 1 tablet by mouth At Night As Needed for Muscle Spasms. 10 tablet 0     No current facility-administered medications for this visit.      Patient is back to his back pain and also anxiety are keeping him awake at night.  Recommend limiting Ativan use as much as possible.  Trial Flexeril nightly to see if this improves insomnia.  Alternatives would be Seroquel versus Ambien, however I would not want him on Ambien and Ativan at the same time.    Diagnoses and all orders for this visit:    1. Insomnia, unspecified type (Primary)    2. Generalized anxiety disorder  -     hydrOXYzine (ATARAX) 50 MG tablet; Take 1 tablet by mouth At Night As Needed for Anxiety.  Dispense: 90 tablet; Refill: 3  -     LORazepam (Ativan) 0.5 MG tablet; Take 1 tablet by mouth 2 (Two) Times a Day As Needed for Anxiety.  Dispense: 30 tablet; Refill: 0    3. Chronic low back pain without sciatica, unspecified back pain laterality  -     cyclobenzaprine (FLEXERIL) 10 MG tablet; Take 1 tablet by mouth At Night As Needed for Muscle Spasms.  Dispense: 10 tablet; Refill: 0         Return in about 4 weeks (around 1/12/2022).     Dictated Utilizing Dragon Dictation    Please note that portions of this note were completed with a voice recognition program.    Part  of this note may be an electronic transcription/translation of spoken language to printed text using the Dragon Dictation System.

## 2022-01-11 RX ORDER — LORAZEPAM 0.5 MG/1
0.5 TABLET ORAL 2 TIMES DAILY PRN
Qty: 30 TABLET | Refills: 0 | Status: CANCELLED
Start: 2022-01-11

## 2022-01-12 ENCOUNTER — OFFICE VISIT (OUTPATIENT)
Dept: FAMILY MEDICINE CLINIC | Facility: CLINIC | Age: 62
End: 2022-01-12

## 2022-01-12 VITALS
BODY MASS INDEX: 36.26 KG/M2 | TEMPERATURE: 97.5 F | WEIGHT: 231 LBS | HEART RATE: 78 BPM | HEIGHT: 67 IN | SYSTOLIC BLOOD PRESSURE: 115 MMHG | DIASTOLIC BLOOD PRESSURE: 80 MMHG | OXYGEN SATURATION: 97 %

## 2022-01-12 DIAGNOSIS — F41.1 GENERALIZED ANXIETY DISORDER: Primary | ICD-10-CM

## 2022-01-12 DIAGNOSIS — F33.9 EPISODE OF RECURRENT MAJOR DEPRESSIVE DISORDER, UNSPECIFIED DEPRESSION EPISODE SEVERITY: ICD-10-CM

## 2022-01-12 DIAGNOSIS — R41.3 MEMORY LOSS: ICD-10-CM

## 2022-01-12 DIAGNOSIS — G47.00 INSOMNIA, UNSPECIFIED TYPE: ICD-10-CM

## 2022-01-12 PROCEDURE — 99213 OFFICE O/P EST LOW 20 MIN: CPT | Performed by: INTERNAL MEDICINE

## 2022-01-12 NOTE — PROGRESS NOTES
Chief Complaint   Patient presents with   • Insomnia   • Anxiety       HPI:  Félix Brian is a 61 y.o. male who presents today for follow-up anxiety, depression, insomnia and memory loss.  He was previously doing well on Ativan and hydroxyzine as needed.  Unfortunately he is now having reactions to Ativan being headache and worsening depression.  Appointment to establish care with psychiatry in 2 weeks.    ROS:  Constitutional: no fevers, night sweats or unexplained weight loss  Eyes: no vision changes  ENT: no runny nose, ear pain, sore throat  Cardio: no chest pain, palpitations  Pulm: no shortness of breath, wheezing, or cough  GI: no abdominal pain or changes in bowel movements  : no difficulty urinating  MSK: no difficulty ambulating, no joint pain  Neuro: no weakness, dizziness or headache  Psych: no trouble sleeping  Endo: no change in appetite      Past Medical History:   Diagnosis Date   • Arthritis    • Boston esophagus    • GERD (gastroesophageal reflux disease)    • Hypertension       Family History   Problem Relation Age of Onset   • Cancer Mother    • No Known Problems Father    • Diabetes Brother    • Cancer Maternal Grandmother    • Cancer Maternal Grandfather    • Cancer Paternal Grandmother    • Cancer Paternal Grandfather       Social History     Socioeconomic History   • Marital status:    Tobacco Use   • Smoking status: Never Smoker   • Smokeless tobacco: Never Used   Vaping Use   • Vaping Use: Never used   Substance and Sexual Activity   • Alcohol use: No   • Drug use: Yes     Types: Marijuana   • Sexual activity: Defer      Allergies   Allergen Reactions   • Ativan [Lorazepam] Mental Status Change   • Buspar [Buspirone] Mental Status Change        There is no immunization history on file for this patient.     PE:  Vitals:    01/12/22 1259   BP: 115/80   Pulse: 78   Temp: 97.5 °F (36.4 °C)   SpO2: 97%      Body mass index is 36.17 kg/m².    Gen Appearance: NAD  HEENT:  Normocephalic, PERRLA, no thyromegaly, trache midline  Heart: RRR, normal S1 and S2, no murmur  Lungs: CTA b/l, no wheezing, no crackles  Abdomen: Soft, non-tender, non-distended, no guarding and BSx4  MSK: Moves all extremities well, normal gait, no peripheral edema  Pulses: Palpable and equal b/l  Lymph nodes: No palpable lymphadenopathy   Neuro: No focal deficits      Current Outpatient Medications   Medication Sig Dispense Refill   • cyclobenzaprine (FLEXERIL) 10 MG tablet Take 1 tablet by mouth At Night As Needed for Muscle Spasms. 10 tablet 0   • hydrOXYzine (ATARAX) 50 MG tablet Take 1 tablet by mouth At Night As Needed for Anxiety. 90 tablet 3   • LORazepam (Ativan) 0.5 MG tablet Take 1 tablet by mouth 2 (Two) Times a Day As Needed for Anxiety. 30 tablet 0   • omeprazole (priLOSEC) 40 MG capsule Take 40 mg by mouth Daily. OTC       No current facility-administered medications for this visit.      Patient adamant about avoiding any new medications at today's visit.  He would like to wean off all medications if possible and follow-up with psychiatry in the next coming weeks.  He is accompanied by family over today reports his symptoms are somewhat improving.  He continues to have intermittent episodes of memory loss which I suspect are due to depression.  He has had neurologic work-up in the past.    Diagnoses and all orders for this visit:    1. Generalized anxiety disorder (Primary)    2. Episode of recurrent major depressive disorder, unspecified depression episode severity (HCC)    3. Insomnia, unspecified type    4. Memory loss         No follow-ups on file.     Dictated Utilizing Dragon Dictation    Please note that portions of this note were completed with a voice recognition program.    Part of this note may be an electronic transcription/translation of spoken language to printed text using the Dragon Dictation System.

## 2022-01-26 ENCOUNTER — OFFICE VISIT (OUTPATIENT)
Dept: PSYCHIATRY | Facility: CLINIC | Age: 62
End: 2022-01-26

## 2022-01-26 VITALS
SYSTOLIC BLOOD PRESSURE: 142 MMHG | HEART RATE: 65 BPM | WEIGHT: 230.5 LBS | DIASTOLIC BLOOD PRESSURE: 80 MMHG | HEIGHT: 67 IN | BODY MASS INDEX: 36.18 KG/M2

## 2022-01-26 DIAGNOSIS — Z00.8 ENCOUNTER FOR GENERAL PSYCHIATRIC EXAMINATION WITHOUT NEED FOR CARE: Primary | ICD-10-CM

## 2022-01-26 PROCEDURE — 90792 PSYCH DIAG EVAL W/MED SRVCS: CPT | Performed by: NURSE PRACTITIONER

## 2022-01-26 NOTE — PROGRESS NOTES
"Chief Complaint  Memory Loss      Subjective          Félix Brian presents to [facility] for initial evaluation.    History of Present Illness: Patient presents today as referral from his primary care physician.  Patient reports he is not taking any psychiatric medications, but has taken trazodone, BuSpar, Ativan, and Atarax in the past.  Patient reports he was electrocuted in February 2021, and has had a lot of issues since that time, especially with his memory.  He reports he lives alone and did not realize he was having issues until he reached out to family.  \"I called people and was asking them where my horses are.  I was so confused\".  Patient reports he was started on multiple medications for various health reasons during last year.  He says he was started on 4 or 5 different cardiac medications, as well as different antidepressants, and medications to help him sleep.  He was also started on Aricept for his memory.  Patient reports he did not have good outcomes with any of the medications, and in most cases had paradoxical reactions to the medications.  \"The heart meds made my heart do crazy stuff, and the psych meds just made me go nuts\".  Patient said he was unable to sleep for days, and was hallucinating when he was taking psychiatric medications.  \"They we were just crazy.  I could even tell you about what I was hallucinating about\".  Patient reports he started reliving the death of his wife (2018), mother (2018), and uncle (2019).  Patient ports he had a very difficult time with all 3 of these deaths when they occurred, but says he was able to progress beyond them, and was no longer in a position of grieving them.  \"But those meds just brought all of that stuff back.  I could not handle it\".  Patient feels when he was taking medications he was very depressed, but says since stopping then he has done much better.  He reports his sleep is been improving since " stopping medication, and says the last couple weeks has been the best it has been since before starting them.  Patient denies any new or significant issues with his appetite.  Patient denies any SI/HI, A/V hallucinations.    Past Psychiatric History: Patient has no history of psychiatric hospitalizations, suicide attempts, or self harming behaviors.    Substance Use/Abuse: Patient denies tobacco use or alcohol use.  He does endorse cannabis use on a daily basis approximately 2-3 times per day.  Patient reports he has been smoking cannabis at that rate since he was a teenager.    Past Medical/Developmental History: Boston's esophagus, some cardiac arrhythmias after his electrocution.  Patient denies any other known significant past medical history.      Family Psychiatric History: Patient denies any known family psychiatric history.      Social History: Patient is originally from [place], [place].  He is the oldest of 4 children with 2 younger sisters and 1 younger brother.  He reports he is close with his younger sister.  Patient's father  when he was 4 years old, but he reports he was close with his mother until her death.  He reports he is now close with his stepfather as well.  Patient has been  3 times.  His first marriage lasted 3 years and resulted in one daughter who is now 36 years old.  His second marriage lasted 11 years and resulted in 2 sons who are ages 33 and 30.  His third marriage lasted 9 years until the death of his wife from lung cancer.  Patient reports he is close with all 3 of his children.  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      Current Medications:   Current Outpatient Medications   Medication Sig Dispense Refill   • omeprazole (priLOSEC) 40 MG capsule Take 40 mg by mouth Daily. OTC       No current facility-administered medications for this visit.       Mental Status Exam:   Hygiene:    "good  Cooperation:  Cooperative  Eye Contact:  Good  Psychomotor Behavior:  Appropriate  Affect:  Appropriate  Mood: euthymic  Speech:  Normal  Thought Process:  Goal directed  Thought Content:  Mood congruent  Suicidal:  None  Homicidal:  None  Hallucinations:  None  Delusion:  None  Memory:  Deficits  Orientation:  Person, Place, Time and Situation  Reliability:  good  Insight:  Good  Judgement:  Good  Impulse Control:  Good  Physical/Medical Issues:  Yes Boston's esophagus     Objective   Vital Signs:   /80   Pulse 65   Ht 170.2 cm (67.01\")   Wt 105 kg (230 lb 8 oz)   BMI 36.09 kg/m²     Physical Exam  Neurological:      Mental Status: He is oriented to person, place, and time. Mental status is at baseline.      Coordination: Coordination is intact.      Gait: Gait is intact.   Psychiatric:         Behavior: Behavior is cooperative.        Result Review :     The following data was reviewed by: MARIANNA Chapin on 01/26/2022:    Data reviewed: PCP notes, medication history          Assessment and Plan    Problem List Items Addressed This Visit     None      Visit Diagnoses     Encounter for general psychiatric examination without need for care    -  Primary            Depression Screening:  Patient screened positive for depression based on a PHQ-9 score of 12 on 12/6/2021. Follow-up recommendations include: Suicide Risk Assessment performed.      Tobacco Cessation:  Patient has denied an present or past tobacco use. No tobacco cessation education necessary.       Impression/Plan:  -This is my initial interaction with the patient.  Patient presents today as a pleasant, 61-year-old,  male.  He presents for initial evaluation secondary to issues with his memory, and possible past issues with anxiety and depression.  Patient reports today he does not feel he is suffering from anxiety and depression.  He previously took various medications for both psychiatric and physical health issues.  He " reports that these medications did more harm than good, and is not particularly interested in taking any medication at this time.  He reports he has an appointment with [facility] for a neuropsychology evaluation in May.  The patient reports he is at the point where he believes most of his issues have been caused by his electrocution, and would like to pursue the physical treatment route.  Advised the patient if he is not wanting to take medication, and he does not feel that he is suffering from anxiety and depression, that he should most likely pursue that course.  Advised patient he can return on a as needed basis if he decides he needs to take medication, or is interested in further evaluation.  Patient expresses understanding and is in agreement with that plan.  -Advised patient he can return as needed.    MEDS ORDERED DURING VISIT:  No orders of the defined types were placed in this encounter.        Follow Up   Return if symptoms worsen or fail to improve.  Patient was given instructions and counseling regarding his condition or for health maintenance advice. Please see specific information pulled into the AVS if appropriate.       TREATMENT PLAN/GOALS: Continue supportive psychotherapy efforts and medications as indicated. Treatment and medication options discussed during today's visit. Patient acknowledged and verbally consented to continue with current treatment plan and was educated on the importance of compliance with treatment and follow-up appointments.    MEDICATION ISSUES:  Discussed medication options and treatment plan of prescribed medication as well as the risks, benefits, and side effects including potential falls, possible impaired driving and metabolic adversities among others. Patient is agreeable to call the office with any worsening of symptoms or onset of side effects. Patient is agreeable to call 911 or go to the nearest ER should he/she begin having SI/HI.            This  document has been electronically signed by MARIANNA Leslie, PMHNP-BC  January 26, 2022 14:14 EST      Part of this note may be an electronic transcription/translation of spoken language to printed text using the Dragon Dictation System.

## 2022-02-02 ENCOUNTER — OFFICE VISIT (OUTPATIENT)
Dept: FAMILY MEDICINE CLINIC | Facility: CLINIC | Age: 62
End: 2022-02-02

## 2022-02-02 VITALS
DIASTOLIC BLOOD PRESSURE: 78 MMHG | HEIGHT: 67 IN | OXYGEN SATURATION: 97 % | TEMPERATURE: 97.6 F | WEIGHT: 231 LBS | BODY MASS INDEX: 36.26 KG/M2 | SYSTOLIC BLOOD PRESSURE: 122 MMHG | HEART RATE: 75 BPM

## 2022-02-02 DIAGNOSIS — G89.29 CHRONIC NONINTRACTABLE HEADACHE, UNSPECIFIED HEADACHE TYPE: Primary | ICD-10-CM

## 2022-02-02 DIAGNOSIS — R51.9 CHRONIC NONINTRACTABLE HEADACHE, UNSPECIFIED HEADACHE TYPE: Primary | ICD-10-CM

## 2022-02-02 DIAGNOSIS — D17.1 LIPOMA OF TORSO: ICD-10-CM

## 2022-02-02 DIAGNOSIS — H57.12 LEFT EYE PAIN: ICD-10-CM

## 2022-02-02 PROCEDURE — 99215 OFFICE O/P EST HI 40 MIN: CPT | Performed by: INTERNAL MEDICINE

## 2022-02-02 RX ORDER — OMEPRAZOLE 40 MG/1
40 CAPSULE, DELAYED RELEASE ORAL DAILY
Qty: 90 CAPSULE | Refills: 3 | Status: SHIPPED | OUTPATIENT
Start: 2022-02-02 | End: 2022-04-13

## 2022-02-02 NOTE — PROGRESS NOTES
Chief Complaint   Patient presents with   • Hypertension   • Anxiety   • Headache     Pt states he has been having headaches and having pain behind his left eye.       HPI:  Félix Brian is a 61 y.o. male who presents today for follow-up. Recently established with psychiatry but ultimately came to the conclusion that his symptoms were not anxiety or depression related.  He suspects most of his symptoms are due to electrocution injury 1 year ago.  He has follow-up with neurology next week.  He plans on neuropsych evaluation in May at .  His main concern today is left-sided eye pain and headache.  He reports his anxiety and memory loss are significantly worse when he has headache.  He is taking Tylenol daily.  He like a lesion on his torso evaluated as well.    ROS:  Constitutional: no fevers, night sweats or unexplained weight loss  Eyes: no vision changes  ENT: no runny nose, ear pain, sore throat  Cardio: no chest pain, palpitations  Pulm: no shortness of breath, wheezing, or cough  GI: no abdominal pain or changes in bowel movements  : no difficulty urinating  MSK: no difficulty ambulating, no joint pain  Neuro: no weakness, dizziness or headache  Psych: no trouble sleeping  Endo: no change in appetite      Past Medical History:   Diagnosis Date   • Arthritis    • Boston esophagus    • GERD (gastroesophageal reflux disease)    • Hypertension       Family History   Problem Relation Age of Onset   • Cancer Mother    • No Known Problems Father    • Diabetes Brother    • Cancer Maternal Grandmother    • Cancer Maternal Grandfather    • Cancer Paternal Grandmother    • Cancer Paternal Grandfather       Social History     Socioeconomic History   • Marital status:    Tobacco Use   • Smoking status: Never Smoker   • Smokeless tobacco: Never Used   Vaping Use   • Vaping Use: Never used   Substance and Sexual Activity   • Alcohol use: No   • Drug use: Yes     Types: Marijuana   • Sexual activity: Defer       Allergies   Allergen Reactions   • Ativan [Lorazepam] Mental Status Change   • Buspar [Buspirone] Mental Status Change        There is no immunization history on file for this patient.     PE:  Vitals:    02/02/22 1056   BP: 122/78   Pulse: 75   Temp: 97.6 °F (36.4 °C)   SpO2: 97%      Body mass index is 36.17 kg/m².    Gen Appearance: NAD  HEENT: Normocephalic, PERRLA, no thyromegaly, trache midline  Heart: RRR, normal S1 and S2, no murmur  Lungs: CTA b/l, no wheezing, no crackles  Abdomen: Soft, non-tender, non-distended, no guarding and BSx4  MSK: Moves all extremities well, normal gait, no peripheral edema  Pulses: Palpable and equal b/l  Lymph nodes: No palpable lymphadenopathy   Neuro: No focal deficits      Current Outpatient Medications   Medication Sig Dispense Refill   • omeprazole (priLOSEC) 40 MG capsule Take 1 capsule by mouth Daily. OTC 90 capsule 3     No current facility-administered medications for this visit.      Offered patient preventative treatment such as propranolol for chronic daily headaches.  I suspect there is a component of medication overuse with Tylenol multiple times per day.  Follow-up with neurology next week for further discussion and treatment.  He would like to hold off on medicine at this time.  Referring to general surgery for likely lipoma of torso.  Recommend ophthalmology evaluation for left eye pain.    Counseling was given to patient for the following topics: instructions for management, impressions and risks and benefits of treatment options . Total time of the encounter was 40 minutes and 20 minutes was spent face to face counseling.    Diagnoses and all orders for this visit:    1. Chronic nonintractable headache, unspecified headache type (Primary)    2. Lipoma of torso    3. Left eye pain    Other orders  -     omeprazole (priLOSEC) 40 MG capsule; Take 1 capsule by mouth Daily. OTC  Dispense: 90 capsule; Refill: 3         No follow-ups on file.     Dictated  Utilizing Dragon Dictation    Please note that portions of this note were completed with a voice recognition program.    Part of this note may be an electronic transcription/translation of spoken language to printed text using the Dragon Dictation System.

## 2022-02-07 ENCOUNTER — OFFICE VISIT (OUTPATIENT)
Dept: NEUROLOGY | Facility: CLINIC | Age: 62
End: 2022-02-07

## 2022-02-07 DIAGNOSIS — R41.3 MEMORY LOSS: Primary | ICD-10-CM

## 2022-02-07 PROCEDURE — 99215 OFFICE O/P EST HI 40 MIN: CPT | Performed by: PHYSICIAN ASSISTANT

## 2022-02-07 NOTE — PROGRESS NOTES
Subjective       Chief Complaint: memory loss      History of Present Illness   Félix Brian is a 61 y.o. male who returns to clinic today for evaluation of memory loss. He has noted symptoms since at least early 2021 marked initially by forgetfulness and word-finding difficulties. This has worsened over time, particularly after he was hospitalized at [facility] in 8/21. Additional symptoms have included impairments in orientation and executive function. There have been associated  symptoms of anxiety and depression. He denies impairments in ADL's. 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 He manages his medications.      He also reports a history of sharp left retro-orbital headaches. This has also worsened over time. He typically notes up to several headaches a daily, lasting 1-4 hours. There is associated light sensitivity and blurry vision on the left. His symptoms are worse with increased stress. He recently started depakote.      Prior evaluation has included screening blood work  and an MRI of the brain which showed mild chronic small vessel ischemic changes, but was otherwise unremarkable. An EEG was also unremarkable. He previously took donepezil 5mg daily.    Today: Since his last visit in 10/21, he feels that his memory has gradually improved after discontinuing lorazepam though he does not feel that he is quite back to his previous cognitive baseline. He is concerned that his cognitive symptoms are related to an accidental electrocution he suffered in 2/21. He is scheduled for neuropsychological testing in 5/22.         I have reviewed and confirmed the past family, social and medical history as accurate on 2/7/22.     Review of Systems   Constitutional: Negative.    HENT: Negative.    Eyes: Negative.    Respiratory: Negative.    Cardiovascular: Negative.    Gastrointestinal: Negative.    Endocrine: Negative.     Genitourinary: Negative.    Musculoskeletal: Negative.    Skin: Negative.    Allergic/Immunologic: Negative.    Hematological: Negative.    Psychiatric/Behavioral: Negative.        Objective     General appearance today is normal.         Physical Exam  Neurological:      Mental Status: He is oriented to person, place, and time.      Coordination: Finger-Nose-Finger Test normal.      Deep Tendon Reflexes: Strength normal.   Psychiatric:         Speech: Speech normal.          Neurologic Exam     Mental Status   Oriented to person, place, and time.   Registration: recalls 3 of 3 objects. Recall at 5 minutes: recalls 1 of 3 objects. Follows 3 step commands.   Attention: normal.   Speech: speech is normal   Level of consciousness: alert  Able to name object. Able to read. Able to repeat. Able to write. Normal comprehension.     Cranial Nerves   Cranial nerves II through XII intact.     Motor Exam   Muscle bulk: normal  Overall muscle tone: normal    Strength   Strength 5/5 throughout.     Gait, Coordination, and Reflexes     Coordination   Finger to nose coordination: normal    Tremor   Resting tremor: absent        Results  MMSE=28  MoCA=25 in 10/21       Assessment/Plan   Diagnoses and all orders for this visit:    1. Memory loss (Primary)          Discussion/Summary   Félix Brian returns to clinic today for evaluation of memory loss . His history and examination is potentially consistent with underlying Mild Cognitive Impairment. However, I remain concerned that his history of anxiety and depression, poor sleep, and some of his previous medications have been negatively affecting his cognition. This was discussed in detail. It was not elected to restart a cognitive enhancer at this time. He will then follow up in 4 months , or sooner if needed.   Total time of visit today: 40 minutes. As part of this visit I discussed the history with the patient . I also discussed diagnosis, prognosis, diagnostic testing,  evaluation, current status, treatment options and management as discussed above.             Caren Alvarez PA-C

## 2022-02-24 ENCOUNTER — APPOINTMENT (OUTPATIENT)
Dept: GENERAL RADIOLOGY | Facility: HOSPITAL | Age: 62
End: 2022-02-24

## 2022-02-24 ENCOUNTER — APPOINTMENT (OUTPATIENT)
Dept: CT IMAGING | Facility: HOSPITAL | Age: 62
End: 2022-02-24

## 2022-02-24 ENCOUNTER — HOSPITAL ENCOUNTER (EMERGENCY)
Facility: HOSPITAL | Age: 62
Discharge: HOME | End: 2022-02-24
Attending: EMERGENCY MEDICINE | Admitting: EMERGENCY MEDICINE

## 2022-02-24 VITALS
BODY MASS INDEX: 30.72 KG/M2 | TEMPERATURE: 98.1 F | RESPIRATION RATE: 16 BRPM | SYSTOLIC BLOOD PRESSURE: 148 MMHG | WEIGHT: 231.8 LBS | DIASTOLIC BLOOD PRESSURE: 89 MMHG | OXYGEN SATURATION: 98 % | HEART RATE: 74 BPM | HEIGHT: 73 IN

## 2022-02-24 DIAGNOSIS — R41.3 SHORT-TERM MEMORY LOSS: ICD-10-CM

## 2022-02-24 DIAGNOSIS — R55 RECURRENT SYNCOPE: Primary | ICD-10-CM

## 2022-02-24 LAB
ALBUMIN SERPL-MCNC: 4.4 G/DL (ref 3.5–5.2)
ALBUMIN/GLOB SERPL: 1.6 G/DL
ALP SERPL-CCNC: 79 U/L (ref 39–117)
ALT SERPL W P-5'-P-CCNC: 18 U/L (ref 1–41)
ANION GAP SERPL CALCULATED.3IONS-SCNC: 12 MMOL/L (ref 5–15)
AST SERPL-CCNC: 20 U/L (ref 1–40)
BASOPHILS # BLD AUTO: 0.03 10*3/MM3 (ref 0–0.2)
BASOPHILS NFR BLD AUTO: 0.3 % (ref 0–1.5)
BILIRUB SERPL-MCNC: 0.3 MG/DL (ref 0–1.2)
BUN SERPL-MCNC: 10 MG/DL (ref 8–23)
BUN/CREAT SERPL: 10.1 (ref 7–25)
CALCIUM SPEC-SCNC: 9.2 MG/DL (ref 8.6–10.5)
CHLORIDE SERPL-SCNC: 105 MMOL/L (ref 98–107)
CO2 SERPL-SCNC: 24 MMOL/L (ref 22–29)
CREAT SERPL-MCNC: 0.99 MG/DL (ref 0.76–1.27)
DEPRECATED RDW RBC AUTO: 47.3 FL (ref 37–54)
EOSINOPHIL # BLD AUTO: 0.04 10*3/MM3 (ref 0–0.4)
EOSINOPHIL NFR BLD AUTO: 0.4 % (ref 0.3–6.2)
ERYTHROCYTE [DISTWIDTH] IN BLOOD BY AUTOMATED COUNT: 14.3 % (ref 12.3–15.4)
GFR SERPL CREATININE-BSD FRML MDRD: 77 ML/MIN/1.73
GLOBULIN UR ELPH-MCNC: 2.7 GM/DL
GLUCOSE SERPL-MCNC: 121 MG/DL (ref 65–99)
HCT VFR BLD AUTO: 45.1 % (ref 37.5–51)
HGB BLD-MCNC: 15.4 G/DL (ref 13–17.7)
HOLD SPECIMEN: NORMAL
HOLD SPECIMEN: NORMAL
IMM GRANULOCYTES # BLD AUTO: 0.05 10*3/MM3 (ref 0–0.05)
IMM GRANULOCYTES NFR BLD AUTO: 0.5 % (ref 0–0.5)
LYMPHOCYTES # BLD AUTO: 1.7 10*3/MM3 (ref 0.7–3.1)
LYMPHOCYTES NFR BLD AUTO: 17.9 % (ref 19.6–45.3)
MCH RBC QN AUTO: 31.4 PG (ref 26.6–33)
MCHC RBC AUTO-ENTMCNC: 34.1 G/DL (ref 31.5–35.7)
MCV RBC AUTO: 91.9 FL (ref 79–97)
MONOCYTES # BLD AUTO: 0.44 10*3/MM3 (ref 0.1–0.9)
MONOCYTES NFR BLD AUTO: 4.6 % (ref 5–12)
NEUTROPHILS NFR BLD AUTO: 7.26 10*3/MM3 (ref 1.7–7)
NEUTROPHILS NFR BLD AUTO: 76.3 % (ref 42.7–76)
NRBC BLD AUTO-RTO: 0 /100 WBC (ref 0–0.2)
PLATELET # BLD AUTO: 216 10*3/MM3 (ref 140–450)
PMV BLD AUTO: 10 FL (ref 6–12)
POTASSIUM SERPL-SCNC: 4.6 MMOL/L (ref 3.5–5.2)
PROT SERPL-MCNC: 7.1 G/DL (ref 6–8.5)
RBC # BLD AUTO: 4.91 10*6/MM3 (ref 4.14–5.8)
RBC MORPH BLD: NORMAL
SMALL PLATELETS BLD QL SMEAR: ADEQUATE
SODIUM SERPL-SCNC: 141 MMOL/L (ref 136–145)
TROPONIN T SERPL-MCNC: <0.01 NG/ML (ref 0–0.03)
WBC MORPH BLD: NORMAL
WBC NRBC COR # BLD: 9.52 10*3/MM3 (ref 3.4–10.8)
WHOLE BLOOD HOLD SPECIMEN: NORMAL
WHOLE BLOOD HOLD SPECIMEN: NORMAL

## 2022-02-24 PROCEDURE — 85025 COMPLETE CBC W/AUTO DIFF WBC: CPT | Performed by: EMERGENCY MEDICINE

## 2022-02-24 PROCEDURE — 93005 ELECTROCARDIOGRAM TRACING: CPT | Performed by: EMERGENCY MEDICINE

## 2022-02-24 PROCEDURE — 85007 BL SMEAR W/DIFF WBC COUNT: CPT | Performed by: EMERGENCY MEDICINE

## 2022-02-24 PROCEDURE — 84484 ASSAY OF TROPONIN QUANT: CPT | Performed by: EMERGENCY MEDICINE

## 2022-02-24 PROCEDURE — 99283 EMERGENCY DEPT VISIT LOW MDM: CPT

## 2022-02-24 PROCEDURE — 70450 CT HEAD/BRAIN W/O DYE: CPT

## 2022-02-24 PROCEDURE — 71045 X-RAY EXAM CHEST 1 VIEW: CPT

## 2022-02-24 PROCEDURE — 80053 COMPREHEN METABOLIC PANEL: CPT | Performed by: EMERGENCY MEDICINE

## 2022-02-24 RX ORDER — SODIUM CHLORIDE 0.9 % (FLUSH) 0.9 %
10 SYRINGE (ML) INJECTION AS NEEDED
Status: DISCONTINUED | OUTPATIENT
Start: 2022-02-24 | End: 2022-02-24 | Stop reason: HOSPADM

## 2022-02-24 RX ORDER — ASPIRIN 325 MG
325 TABLET ORAL ONCE
Status: DISCONTINUED | OUTPATIENT
Start: 2022-02-24 | End: 2022-02-24

## 2022-02-24 RX ADMIN — SODIUM CHLORIDE 1000 ML: 9 INJECTION, SOLUTION INTRAVENOUS at 15:08

## 2022-02-25 ENCOUNTER — TELEPHONE (OUTPATIENT)
Dept: FAMILY MEDICINE CLINIC | Facility: CLINIC | Age: 62
End: 2022-02-25

## 2022-03-02 ENCOUNTER — OFFICE VISIT (OUTPATIENT)
Dept: FAMILY MEDICINE CLINIC | Facility: CLINIC | Age: 62
End: 2022-03-02

## 2022-03-02 ENCOUNTER — TELEPHONE (OUTPATIENT)
Dept: FAMILY MEDICINE CLINIC | Facility: CLINIC | Age: 62
End: 2022-03-02

## 2022-03-02 VITALS
BODY MASS INDEX: 30.62 KG/M2 | HEART RATE: 73 BPM | WEIGHT: 231 LBS | SYSTOLIC BLOOD PRESSURE: 130 MMHG | OXYGEN SATURATION: 98 % | HEIGHT: 73 IN | DIASTOLIC BLOOD PRESSURE: 94 MMHG

## 2022-03-02 DIAGNOSIS — F33.9 EPISODE OF RECURRENT MAJOR DEPRESSIVE DISORDER, UNSPECIFIED DEPRESSION EPISODE SEVERITY: Primary | ICD-10-CM

## 2022-03-02 DIAGNOSIS — F41.1 GENERALIZED ANXIETY DISORDER: ICD-10-CM

## 2022-03-02 PROCEDURE — 99215 OFFICE O/P EST HI 40 MIN: CPT | Performed by: INTERNAL MEDICINE

## 2022-03-02 RX ORDER — LORAZEPAM 0.5 MG/1
0.5 TABLET ORAL DAILY PRN
Qty: 10 TABLET | Refills: 0 | Status: SHIPPED | OUTPATIENT
Start: 2022-03-02 | End: 2022-03-25

## 2022-03-02 RX ORDER — HYDROXYZINE 50 MG/1
50 TABLET, FILM COATED ORAL 3 TIMES DAILY PRN
COMMUNITY
End: 2022-03-02 | Stop reason: SDUPTHER

## 2022-03-02 RX ORDER — HYDROXYZINE 50 MG/1
50 TABLET, FILM COATED ORAL NIGHTLY PRN
Qty: 30 TABLET | Refills: 2 | Status: SHIPPED | OUTPATIENT
Start: 2022-03-02 | End: 2022-04-26 | Stop reason: SDUPTHER

## 2022-03-02 RX ORDER — BUPROPION HYDROCHLORIDE 150 MG/1
150 TABLET ORAL DAILY
Qty: 30 TABLET | Refills: 2 | Status: SHIPPED | OUTPATIENT
Start: 2022-03-02 | End: 2022-04-13

## 2022-03-02 RX ORDER — LORAZEPAM 0.5 MG/1
0.5 TABLET ORAL EVERY 8 HOURS PRN
COMMUNITY
End: 2022-03-02 | Stop reason: SDUPTHER

## 2022-03-02 NOTE — TELEPHONE ENCOUNTER
SARAH IS CALLING TO TELL DR RUBIO THAT SHE WILL NOT BE AT THE APPOINTMENT TODAY AND THAT IN CONFIDENCE ONLY THAT:    1.  TIMI WILL NOT TELL DR RUBIO THAT HE HAS DEPRESSION AND ANXIETY 24/7/365. TIMI IS CONVINCED THAT HE IS DYING. AND WILL NOT DO ANYTHING, HE CRIES A LOT.  HE NEVER GOES ANY WHERE.  HE WILL TAKE CARE OF HIS ANIMALS BUT NOTHING MORE.  TIMI IS NOT THE SAME THE BROTHER I KNOW.    2.  PLEASE DO NOT TELL TIMI THAT I TOLD YOU THIS.  HE WILL NOT TAKE ANY MEDICATION WHATSOEVER.  BRITTNEY STRESS HOW IMPORTANT TIMI FOLLOW HIS DIRECTIONS.

## 2022-03-03 NOTE — PROGRESS NOTES
Chief Complaint   Patient presents with   • Loss of Consciousness     [facility] ED f/u    • Fatigue   • Anxiety       HPI:  Félix Brian is a 61 y.o. male who presents today for follow-up anxiety and depression.  Evaluated in the ER for panic attack recently.  He has resumed taking hydroxyzine and Ativan as needed.    ROS:  Constitutional: no fevers, night sweats or unexplained weight loss  Eyes: no vision changes  ENT: no runny nose, ear pain, sore throat  Cardio: no chest pain, palpitations  Pulm: no shortness of breath, wheezing, or cough  GI: no abdominal pain or changes in bowel movements  : no difficulty urinating  MSK: no difficulty ambulating, no joint pain  Neuro: no weakness, dizziness or headache  Psych: no trouble sleeping  Endo: no change in appetite      Past Medical History:   Diagnosis Date   • Arthritis    • Boston esophagus    • Electrocution 02/2021   • GERD (gastroesophageal reflux disease)    • Hypertension    • Memory loss       Family History   Problem Relation Age of Onset   • Cancer Mother    • No Known Problems Father    • Diabetes Brother    • Cancer Maternal Grandmother    • Cancer Maternal Grandfather    • Cancer Paternal Grandmother    • Cancer Paternal Grandfather       Social History     Socioeconomic History   • Marital status:    Tobacco Use   • Smoking status: Never Smoker   • Smokeless tobacco: Never Used   Vaping Use   • Vaping Use: Never used   Substance and Sexual Activity   • Alcohol use: Yes     Comment: 3 x wk    • Drug use: Yes     Types: Marijuana   • Sexual activity: Defer      Allergies   Allergen Reactions   • Ativan [Lorazepam] Mental Status Change   • Buspar [Buspirone] Mental Status Change        There is no immunization history on file for this patient.     PE:  Vitals:    03/02/22 1308   BP: 130/94   Pulse: 73   SpO2: 98%      Body mass index is 30.48 kg/m².    Gen Appearance: NAD  HEENT: Normocephalic, PERRLA, no thyromegaly, trache midline  Heart:  RRR, normal S1 and S2, no murmur  Lungs: CTA b/l, no wheezing, no crackles  Abdomen: Soft, non-tender, non-distended, no guarding and BSx4  MSK: Moves all extremities well, normal gait, no peripheral edema  Pulses: Palpable and equal b/l  Lymph nodes: No palpable lymphadenopathy   Neuro: No focal deficits      Current Outpatient Medications   Medication Sig Dispense Refill   • hydrOXYzine (ATARAX) 50 MG tablet Take 1 tablet by mouth At Night As Needed for Anxiety. 30 tablet 2   • LORazepam (ATIVAN) 0.5 MG tablet Take 1 tablet by mouth Daily As Needed for Anxiety. 10 tablet 0   • omeprazole (priLOSEC) 40 MG capsule Take 1 capsule by mouth Daily. OTC 90 capsule 3   • buPROPion XL (Wellbutrin XL) 150 MG 24 hr tablet Take 1 tablet by mouth Daily. 30 tablet 2     No current facility-administered medications for this visit.      Discussed with patient and his girlfriend that I suspect most of his symptoms are due to anxiety and depression.  Recommend using lorazepam rarely.  Primarily should be using hydroxyzine as needed for sleep at night.  Starting Wellbutrin daily.  Recommend following up with psychiatry for further evaluation and treatment.  See back in 4 weeks.    Counseling was given to patient and caretaker for the following topics: instructions for management, impressions, risks and benefits of treatment options and importance of treatment compliance . Total time of the encounter was 40 minutes and 21 minutes was spent face to face counseling.    Diagnoses and all orders for this visit:    1. Episode of recurrent major depressive disorder, unspecified depression episode severity (HCC) (Primary)  -     LORazepam (ATIVAN) 0.5 MG tablet; Take 1 tablet by mouth Daily As Needed for Anxiety.  Dispense: 10 tablet; Refill: 0    2. Generalized anxiety disorder  -     LORazepam (ATIVAN) 0.5 MG tablet; Take 1 tablet by mouth Daily As Needed for Anxiety.  Dispense: 10 tablet; Refill: 0    Other orders  -     buPROPion XL  (Wellbutrin XL) 150 MG 24 hr tablet; Take 1 tablet by mouth Daily.  Dispense: 30 tablet; Refill: 2  -     hydrOXYzine (ATARAX) 50 MG tablet; Take 1 tablet by mouth At Night As Needed for Anxiety.  Dispense: 30 tablet; Refill: 2         Return in about 4 weeks (around 3/30/2022).     Dictated Utilizing Dragon Dictation    Please note that portions of this note were completed with a voice recognition program.    Part of this note may be an electronic transcription/translation of spoken language to printed text using the Dragon Dictation System.

## 2022-03-04 ENCOUNTER — TELEPHONE (OUTPATIENT)
Dept: FAMILY MEDICINE CLINIC | Facility: CLINIC | Age: 62
End: 2022-03-04

## 2022-03-04 NOTE — TELEPHONE ENCOUNTER
Patient's sister, Tiffany called asking if we could contact patient that he had asked if we could call patient. Called and spoke to patient. Stated he can't seem to get along with any of these antianxiety/ antidepressants  Took a bupropion yesterday and ate a sandwich prior to taking it. Stated an hour later he couldn't function, or get out of bed stated he could barely open his eyes and had almost a drunk feeling. Stated he hasnt had anymore symptoms today.Except  when he woke up at 630 until about an hour ago had chest pain and tightness. Patient took 1/2 of his lorazepam at 830 and pain left at almost 10am. Stated he still feels overexerted but other than that feels fine now. Wanted provider to know what happened and asked about taking something as needed before he feels a panic attack coming on. Not a daily medication. Please advise, thanks

## 2022-03-04 NOTE — TELEPHONE ENCOUNTER
Called and spoke to patient. Informed of providers recommendations. Voiced understanding. Had no further questions at this time.

## 2022-03-04 NOTE — TELEPHONE ENCOUNTER
Discontinue medication. He can continue taking hydroxyzine and lorazepam as needed for now until he follows up with psychiatry.

## 2022-03-24 DIAGNOSIS — F33.9 EPISODE OF RECURRENT MAJOR DEPRESSIVE DISORDER, UNSPECIFIED DEPRESSION EPISODE SEVERITY: ICD-10-CM

## 2022-03-24 DIAGNOSIS — F41.1 GENERALIZED ANXIETY DISORDER: ICD-10-CM

## 2022-03-24 NOTE — TELEPHONE ENCOUNTER
Rx Refill Note  Requested Prescriptions     Pending Prescriptions Disp Refills   • LORazepam (ATIVAN) 0.5 MG tablet [Pharmacy Med Name: LORazepam 0.5 MG Oral Tablet] 10 tablet 0     Sig: TAKE 1 TABLET BY MOUTH ONCE DAILY AS NEEDED FOR ANXIETY      Last office visit with prescribing clinician: 3/2/2022      Next office visit with prescribing clinician: 4/6/2022            Laura Morton MA  03/24/22, 14:14 EDT

## 2022-03-25 RX ORDER — LORAZEPAM 0.5 MG/1
TABLET ORAL
Qty: 10 TABLET | Refills: 0 | Status: SHIPPED | OUTPATIENT
Start: 2022-03-25 | End: 2022-04-13

## 2022-04-05 NOTE — CASE MANAGEMENT/SOCIAL WORK
April 5, 2022     Patient: Damián Haji   YOB: 2017   Date of Visit: 4/5/2022       To Whom it May Concern:    Damián Haji was seen in my clinic on 4/5/2022 at 10:40 am.     Please excuse Damián for his absence from school on the date listed above to be able to make his appointment.    Sincerely,         Elaine Dietz, DO    Medical information is confidential and cannot be disclosed without the written consent of the patient or his representative.       Discharge Planning Assessment  [facility]     Patient Name: Félix Brian  MRN: [id number]  Today's Date: 8/23/2021    Admit Date: 8/21/2021    Discharge Needs Assessment     Row Name 08/23/21 0834       Living Environment    Lives With  alone    Current Living Arrangements  home/apartment/condo    Primary Care Provided by  self    Provides Primary Care For  no one    Family Caregiver if Needed  sibling(s)    Family Caregiver Names  Maame Gale (sister) [phone]    Able to Return to Prior Arrangements  yes       Resource/Environmental Concerns    Resource/Environmental Concerns  none    Transportation Concerns  car, none       Transition Planning    Patient/Family Anticipates Transition to  home    Patient/Family Anticipated Services at Transition  none    Transportation Anticipated  family or friend will provide       Discharge Needs Assessment    Readmission Within the Last 30 Days  no previous admission in last 30 days    Equipment Currently Used at Home  none    Concerns to be Addressed  denies needs/concerns at this time    Anticipated Changes Related to Illness  none        Discharge Plan     Row Name 08/23/21 0835       Plan    Plan  Home    Patient/Family in Agreement with Plan  yes    Plan Comments  Spoke with patient at bedside. Lives alone in [place]. Contact is Maame Gale (sister) [phone]. Is independent with ADL's. No problems with [place] Medicaid or medications. Uses no DME at home. Has no advanced directives. Plan is home. CM will continue to follow.    Final Discharge Disposition Code  01 - home or self-care        Continued Care and Services - Admitted Since 8/21/2021    Coordination has not been started for this encounter.         Demographic Summary     Row Name 08/23/21 0833       General Information    Admission Type  observation    Arrived From  emergency department    Referral Source  admission list    Reason for Consult  discharge planning    Preferred Language  English      Used During This Interaction  no       Contact Information    Permission Granted to Share Info With      Contact Information Obtained for      Contact Information Comments  PCP is Gt Bolaños MD        Functional Status     Row Name 08/23/21 0833       Functional Status    Usual Activity Tolerance  moderate    Current Activity Tolerance  moderate       Functional Status, IADL    Medications  independent    Meal Preparation  independent    Housekeeping  independent    Laundry  independent    Shopping  independent       Mental Status    General Appearance WDL  WDL       Mental Status Summary    Recent Changes in Mental Status/Cognitive Functioning  no changes       Employment/    Employment Status  self-employed        Psychosocial    No documentation.       Abuse/Neglect    No documentation.       Legal    No documentation.       Substance Abuse    No documentation.       Patient Forms    No documentation.           Wilfrido Calix RN

## 2022-04-06 ENCOUNTER — OFFICE VISIT (OUTPATIENT)
Dept: FAMILY MEDICINE CLINIC | Facility: CLINIC | Age: 62
End: 2022-04-06

## 2022-04-06 VITALS
HEART RATE: 78 BPM | WEIGHT: 229 LBS | BODY MASS INDEX: 30.35 KG/M2 | OXYGEN SATURATION: 98 % | HEIGHT: 73 IN | DIASTOLIC BLOOD PRESSURE: 84 MMHG | SYSTOLIC BLOOD PRESSURE: 122 MMHG

## 2022-04-06 DIAGNOSIS — F32.A ANXIETY AND DEPRESSION: Primary | ICD-10-CM

## 2022-04-06 DIAGNOSIS — F41.9 ANXIETY AND DEPRESSION: Primary | ICD-10-CM

## 2022-04-06 PROCEDURE — 99214 OFFICE O/P EST MOD 30 MIN: CPT | Performed by: INTERNAL MEDICINE

## 2022-04-06 NOTE — PROGRESS NOTES
Chief Complaint   Patient presents with   • Depression     1 month f/u    • Fatigue     Artery  20 herbs mixed for circulation. Has taken them and it makes him feel much better.         HPI:  Félix Brian is a 61 y.o. male who presents today for follow-up.  Seems to have episodes of severe anxiety and forgetfulness.  Using an herbal supplement with multiple ingredients.  He has had somewhat improvement in symptoms since then.  Using lorazepam as needed.  Has not started on Wellbutrin.    ROS:  Constitutional: no fevers, night sweats or unexplained weight loss  Eyes: no vision changes  ENT: no runny nose, ear pain, sore throat  Cardio: no chest pain, palpitations  Pulm: no shortness of breath, wheezing, or cough  GI: no abdominal pain or changes in bowel movements  : no difficulty urinating  MSK: no difficulty ambulating, no joint pain  Neuro: no weakness, dizziness or headache  Psych: no trouble sleeping  Endo: no change in appetite      Past Medical History:   Diagnosis Date   • Arthritis    • Boston esophagus    • Electrocution 02/2021   • GERD (gastroesophageal reflux disease)    • Hypertension    • Memory loss       Family History   Problem Relation Age of Onset   • Cancer Mother    • No Known Problems Father    • Diabetes Brother    • Cancer Maternal Grandmother    • Cancer Maternal Grandfather    • Cancer Paternal Grandmother    • Cancer Paternal Grandfather       Social History     Socioeconomic History   • Marital status:    Tobacco Use   • Smoking status: Never Smoker   • Smokeless tobacco: Never Used   Vaping Use   • Vaping Use: Never used   Substance and Sexual Activity   • Alcohol use: Yes     Comment: 3 x wk    • Drug use: Yes     Types: Marijuana   • Sexual activity: Defer      Allergies   Allergen Reactions   • Ativan [Lorazepam] Mental Status Change   • Buspar [Buspirone] Mental Status Change        There is no immunization history on file for this patient.      PE:  Vitals:    04/06/22 1120   BP: 122/84   Pulse: 78   SpO2: 98%      Body mass index is 30.21 kg/m².    Gen Appearance: NAD  HEENT: Normocephalic, PERRLA, no thyromegaly, trache midline  Heart: RRR, normal S1 and S2, no murmur  Lungs: CTA b/l, no wheezing, no crackles  Abdomen: Soft, non-tender, non-distended, no guarding and BSx4  MSK: Moves all extremities well, normal gait, no peripheral edema  Pulses: Palpable and equal b/l  Lymph nodes: No palpable lymphadenopathy   Neuro: No focal deficits      Current Outpatient Medications   Medication Sig Dispense Refill   • buPROPion XL (Wellbutrin XL) 150 MG 24 hr tablet Take 1 tablet by mouth Daily. 30 tablet 2   • hydrOXYzine (ATARAX) 50 MG tablet Take 1 tablet by mouth At Night As Needed for Anxiety. 30 tablet 2   • LORazepam (ATIVAN) 0.5 MG tablet TAKE 1 TABLET BY MOUTH ONCE DAILY AS NEEDED FOR ANXIETY 10 tablet 0   • omeprazole (priLOSEC) 40 MG capsule Take 1 capsule by mouth Daily. OTC 90 capsule 3     No current facility-administered medications for this visit.      Counseling was given to patient for the following topics: instructions for management, impressions, risks and benefits of treatment options and importance of treatment compliance . Total time of the encounter was 30 minutes and 15 minutes was spent face to face counseling.    Diagnoses and all orders for this visit:    1. Anxiety and depression (Primary)  He has follow-up with neurology next month at .  Encourage patient to follow-up with psychiatry as I think most of his symptoms are mental health related.  He prefers to see neurology first then will schedule psychiatry afterwards.       No follow-ups on file.     Dictated Utilizing Dragon Dictation    Please note that portions of this note were completed with a voice recognition program.    Part of this note may be an electronic transcription/translation of spoken language to printed text using the Dragon Dictation System.

## 2022-04-13 ENCOUNTER — OFFICE VISIT (OUTPATIENT)
Dept: CARDIOLOGY | Facility: CLINIC | Age: 62
End: 2022-04-13

## 2022-04-13 VITALS
DIASTOLIC BLOOD PRESSURE: 72 MMHG | HEART RATE: 64 BPM | HEIGHT: 73 IN | OXYGEN SATURATION: 99 % | SYSTOLIC BLOOD PRESSURE: 126 MMHG | WEIGHT: 228.2 LBS | BODY MASS INDEX: 30.24 KG/M2

## 2022-04-13 DIAGNOSIS — R07.2 PRECORDIAL PAIN: Primary | ICD-10-CM

## 2022-04-13 PROCEDURE — 99213 OFFICE O/P EST LOW 20 MIN: CPT | Performed by: INTERNAL MEDICINE

## 2022-04-13 NOTE — PROGRESS NOTES
[facility]  Office visit  Félix Brian  1960  [phone]  There is no work phone number on file.    VISIT DATE:  4/13/2022    PCP: Nik Bolaños DO  [id number] [facility] [id number]    CC:  Chief Complaint   Patient presents with   • Chest Pain   • Coronary Artery Disease       Previous cardiac studies and procedures:  April 2021  Exercise treadmill test  · The patient exhibited dyspnea and chest tightness at peak exercise.  · Reduced exercise tolerance with an expected exercise duration 8:50, actual 5:03. Patient struggled to stand erect on the treadmill and demonstrated significant deconditioning.  · THR of 136 achieved at 2:42 suggestive of deconditioning or withdrawal from beta-blockade  · Mildly hypertensive response to exercise with a peak pressure of 150/100.  · No significant ST segment shift from baseline with exercise.  Transthoracic echo  · Left ventricular ejection fraction appears to be 56 - 60%. Left ventricular systolic function is normal.  · Left ventricular wall thickness is consistent with mild concentric hypertrophy.  · Mild mitral valve regurgitation is present.  · Borderline mild dilation of the ascending aorta is present.    June 2021 CT coronary angiogram  1. Total calcium score of 76, considered mild.  2. Focal mid LAD calcification, with suspected mild to moderate coronary  artery stenosis. No evidence of potentially significant stenosis  Elsewhere.    August 2021 cardiac catheterization: EF 70%, luminal irregularities only.    ASSESSMENT:   Diagnosis Plan   1. Precordial pain         PLAN:  Atypical chest pain: Currently asymptomatic.  Status post low risk evaluation to include unremarkable cardiac catheterization.  Currently no clinical suspicion for coronary vasospasm or microvascular dysfunction.  No further cardiac evaluation recommended.    Coronary disease: Nonobstructive, mild.  Continue heart healthy diet, intolerant  "to statin therapy.    Hyperlipidemia: Goal LDL less than 100, ideally less than 70.  Continue heart healthy diet.    Presyncope/syncope: Low risk cardiac evaluation.  Onset following electrocution in February 2021 with some associated memory issues.  No recent episodes of presyncope or syncope.  Memory appears to improve by taking over-the-counter herbal supplements.  No further cardiac evaluation recommended this time.    -We will follow up on an as-needed basis.    Subjective  Longstanding history of Boston's esophagus.  No recent chest discomfort.  Blood pressures running less than 130/80 mmHg.  Had memory issues and recurrent presyncope/syncope following electrocution in February 2021.  Medical therapy.  Worsening symptoms.  He essentially stopped everything himself and began to take an over-the-counter herbal supplement and reports improvement in his mentation no further episodes of presyncope or syncope.    PHYSICAL EXAMINATION:  Vitals:    04/13/22 1304   BP: 126/72   BP Location: Right arm   Patient Position: Sitting   Pulse: 64   SpO2: 99%   Weight: 104 kg (228 lb 3.2 oz)   Height: 185.4 cm (73\")     General Appearance:    Alert, cooperative, no distress, appears stated age   Head:    Normocephalic, without obvious abnormality, atraumatic   Eyes:    conjunctiva/corneas clear   Nose:   Nares normal, septum midline, mucosa normal, no drainage   Throat:   Lips, teeth and gums normal   Neck:   Supple, symmetrical, trachea midline, no carotid    bruit or JVD   Lungs:     Clear to auscultation bilaterally, respirations unlabored   Chest Wall:    No tenderness or deformity    Heart:    Regular rate and rhythm, S1 and S2 normal, no murmur, rub   or gallop, normal carotid impulse bilaterally without bruit.   Abdomen:     Soft, non-tender   Extremities:   Extremities normal, atraumatic, no cyanosis or edema   Pulses:   2+ and symmetric all extremities   Skin:   Skin color, texture, turgor normal, no rashes or " lesions       Diagnostic Data:  Procedures  Lab Results   Component Value Date    TRIG 92 10/19/2021    HDL 43 10/19/2021     Lab Results   Component Value Date    GLUCOSE 121 (H) 02/24/2022    BUN 10 02/24/2022    CREATININE 0.99 02/24/2022     02/24/2022    K 4.6 02/24/2022     02/24/2022    CO2 24.0 02/24/2022     Lab Results   Component Value Date    HGBA1C 5.50 08/22/2021     Lab Results   Component Value Date    WBC 9.52 02/24/2022    HGB 15.4 02/24/2022    HCT 45.1 02/24/2022     02/24/2022       Allergies  Allergies   Allergen Reactions   • Ativan [Lorazepam] Mental Status Change   • Buspar [Buspirone] Mental Status Change       Current Medications    Current Outpatient Medications:   •  hydrOXYzine (ATARAX) 50 MG tablet, Take 1 tablet by mouth At Night As Needed for Anxiety., Disp: 30 tablet, Rfl: 2          ROS  ROS      SOCIAL HX  Social History     Socioeconomic History   • Marital status:    Tobacco Use   • Smoking status: Never Smoker   • Smokeless tobacco: Never Used   Vaping Use   • Vaping Use: Never used   Substance and Sexual Activity   • Alcohol use: Yes     Comment: 3 x wk    • Drug use: Yes     Types: Marijuana   • Sexual activity: Defer       FAMILY HX  Family History   Problem Relation Age of Onset   • Cancer Mother    • No Known Problems Father    • Diabetes Brother    • Cancer Maternal Grandmother    • Cancer Maternal Grandfather    • Cancer Paternal Grandmother    • Cancer Paternal Grandfather              Govind Paula III, MD, FACC

## 2022-04-26 ENCOUNTER — TELEPHONE (OUTPATIENT)
Dept: FAMILY MEDICINE CLINIC | Facility: CLINIC | Age: 62
End: 2022-04-26

## 2022-04-26 RX ORDER — HYDROXYZINE 50 MG/1
50 TABLET, FILM COATED ORAL NIGHTLY PRN
Qty: 30 TABLET | Refills: 2 | Status: SHIPPED | OUTPATIENT
Start: 2022-04-26 | End: 2022-09-14 | Stop reason: SDUPTHER

## 2022-04-27 ENCOUNTER — TELEPHONE (OUTPATIENT)
Dept: FAMILY MEDICINE CLINIC | Facility: CLINIC | Age: 62
End: 2022-04-27

## 2022-04-29 DIAGNOSIS — F41.9 ANXIETY AND DEPRESSION: Primary | ICD-10-CM

## 2022-04-29 DIAGNOSIS — F32.A ANXIETY AND DEPRESSION: Primary | ICD-10-CM

## 2022-04-29 RX ORDER — LORAZEPAM 0.5 MG/1
0.5 TABLET ORAL DAILY PRN
Qty: 10 TABLET | Refills: 2 | Status: SHIPPED | OUTPATIENT
Start: 2022-04-29 | End: 2022-06-13 | Stop reason: SDUPTHER

## 2022-06-07 ENCOUNTER — OFFICE VISIT (OUTPATIENT)
Dept: NEUROLOGY | Facility: CLINIC | Age: 62
End: 2022-06-07

## 2022-06-07 VITALS — OXYGEN SATURATION: 94 % | SYSTOLIC BLOOD PRESSURE: 134 MMHG | DIASTOLIC BLOOD PRESSURE: 84 MMHG | HEART RATE: 69 BPM

## 2022-06-07 DIAGNOSIS — R41.3 MEMORY LOSS: Primary | ICD-10-CM

## 2022-06-07 PROCEDURE — 99215 OFFICE O/P EST HI 40 MIN: CPT | Performed by: PHYSICIAN ASSISTANT

## 2022-06-07 NOTE — PROGRESS NOTES
"Subjective       Chief Complaint: memory loss      History of Present Illness   Félix Brian is a 62 y.o. male who returns to clinic today for evaluation of memory loss. He has noted symptoms since at least early 2021 marked initially by forgetfulness and word-finding difficulties. This has worsened over time, particularly after he was hospitalized at [facility] in 8/21. Additional symptoms have included impairments in orientation and executive function. There have been associated  symptoms of anxiety and depression. He denies impairments in ADL's. 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 He manages his medications.      He also reports a history of sharp left retro-orbital headaches. This has also worsened over time. He typically notes up to several headaches a daily, lasting 1-4 hours. There is associated light sensitivity and blurry vision on the left. His symptoms are worse with increased stress. He recently started depakote.      Prior evaluation has included screening blood work  and an MRI of the brain which showed mild chronic small vessel ischemic changes, but was otherwise unremarkable. An EEG was also unremarkable. He previously took donepezil 5mg daily.    Today: Since his last visit in 2/22, he feels essentially unchanged cognitively. He has noted episodes of confusion which have worsened over time. These can occur up to several times a day. For example, he states that it took him 2-3 hours to drive home last week, when the route typically takes 20 minutes. He is amnestic of this time period. He also reports \" adrenaline attacks\" described as \"panic attacks on steroids\". These spells are worse with increased stress. Lorazepam is beneficial. Unfortunately, the history is otherwise unclear.      I have reviewed and confirmed the past family, social and medical history as accurate on 6/7/22.     Review of Systems "   Constitutional: Negative.    HENT: Negative.    Eyes: Negative.    Respiratory: Negative.    Cardiovascular: Negative.    Gastrointestinal: Negative.    Endocrine: Negative.    Genitourinary: Negative.    Musculoskeletal: Negative.    Skin: Negative.    Allergic/Immunologic: Negative.    Hematological: Negative.        Objective     /84   Pulse 69   SpO2 94%     General appearance today is normal.         Physical Exam  Neurological:      Mental Status: He is oriented to person, place, and time.      Coordination: Finger-Nose-Finger Test normal.      Gait: Gait is intact.      Deep Tendon Reflexes: Strength normal.   Psychiatric:         Speech: Speech normal.          Neurologic Exam     Mental Status   Oriented to person, place, and time.   Registration: recalls 3 of 3 objects. Recall at 5 minutes: recalls 3 of 3 objects. Follows 3 step commands.   Attention: normal.   Speech: speech is normal   Level of consciousness: alert  Able to name object. Able to read. Able to repeat. Able to write. Normal comprehension.     Cranial Nerves   Cranial nerves II through XII intact.     Motor Exam   Muscle bulk: normal  Overall muscle tone: normal    Strength   Strength 5/5 throughout.     Gait, Coordination, and Reflexes     Gait  Gait: normal    Coordination   Finger to nose coordination: normal    Tremor   Resting tremor: absent        Results  MMSE=30  MoCA=25 in 10/21       Assessment & Plan   Diagnoses and all orders for this visit:    1. Memory loss (Primary)  -     EEG Awake or Drowsy Routine; Future    Other orders  -     sertraline (Zoloft) 50 MG tablet; Take 1 tablet by mouth Daily.  Dispense: 30 tablet; Refill: 11          Discussion/Summary   Félix Brian returns to clinic today for evaluation of memory loss . His history and examination is potentially consistent with underlying Mild Cognitive Impairment. However, I remain concerned that his history of anxiety and depression, poor sleep, and  some of his previous medications have been negatively affecting his cognition. This was discussed in detail. I again reviewed his current status and treatment options. It was elected to repeat an EEG. After discussing potential treatment options, it was elected to add sertraline. He will then follow up in 5 months shortly after his upcoming neuropsychological testing, or sooner if needed.   Total time of visit today: 40 minutes. As part of this visit I discussed the history with the patient . I also discussed diagnosis, prognosis, diagnostic testing, evaluation, current status, treatment options and management as discussed above.         Caren Alvarez PA-C

## 2022-06-13 ENCOUNTER — LAB (OUTPATIENT)
Dept: LAB | Facility: HOSPITAL | Age: 62
End: 2022-06-13

## 2022-06-13 ENCOUNTER — OFFICE VISIT (OUTPATIENT)
Dept: FAMILY MEDICINE CLINIC | Facility: CLINIC | Age: 62
End: 2022-06-13

## 2022-06-13 ENCOUNTER — TELEPHONE (OUTPATIENT)
Dept: FAMILY MEDICINE CLINIC | Facility: CLINIC | Age: 62
End: 2022-06-13

## 2022-06-13 VITALS
HEART RATE: 78 BPM | SYSTOLIC BLOOD PRESSURE: 132 MMHG | DIASTOLIC BLOOD PRESSURE: 90 MMHG | TEMPERATURE: 97.7 F | WEIGHT: 221 LBS | HEIGHT: 73 IN | BODY MASS INDEX: 29.29 KG/M2 | OXYGEN SATURATION: 98 %

## 2022-06-13 DIAGNOSIS — R41.82 ALTERED MENTAL STATUS, UNSPECIFIED ALTERED MENTAL STATUS TYPE: ICD-10-CM

## 2022-06-13 DIAGNOSIS — F41.9 ANXIETY AND DEPRESSION: ICD-10-CM

## 2022-06-13 DIAGNOSIS — F32.A ANXIETY AND DEPRESSION: ICD-10-CM

## 2022-06-13 DIAGNOSIS — G31.9 NEURODEGENERATIVE COGNITIVE IMPAIRMENT: Primary | ICD-10-CM

## 2022-06-13 DIAGNOSIS — G31.9 NEURODEGENERATIVE COGNITIVE IMPAIRMENT: ICD-10-CM

## 2022-06-13 LAB
25(OH)D3 SERPL-MCNC: 41.3 NG/ML (ref 30–100)
ALBUMIN SERPL-MCNC: 4.9 G/DL (ref 3.5–5.2)
ALBUMIN/GLOB SERPL: 1.9 G/DL
ALP SERPL-CCNC: 87 U/L (ref 39–117)
ALT SERPL W P-5'-P-CCNC: 25 U/L (ref 1–41)
ANION GAP SERPL CALCULATED.3IONS-SCNC: 11.8 MMOL/L (ref 5–15)
AST SERPL-CCNC: 25 U/L (ref 1–40)
BILIRUB SERPL-MCNC: 0.5 MG/DL (ref 0–1.2)
BUN SERPL-MCNC: 12 MG/DL (ref 8–23)
BUN/CREAT SERPL: 11.5 (ref 7–25)
CALCIUM SPEC-SCNC: 10 MG/DL (ref 8.6–10.5)
CHLORIDE SERPL-SCNC: 103 MMOL/L (ref 98–107)
CO2 SERPL-SCNC: 25.2 MMOL/L (ref 22–29)
CREAT SERPL-MCNC: 1.04 MG/DL (ref 0.76–1.27)
EGFRCR SERPLBLD CKD-EPI 2021: 81.7 ML/MIN/1.73
FOLATE SERPL-MCNC: 7.57 NG/ML (ref 4.78–24.2)
GLOBULIN UR ELPH-MCNC: 2.6 GM/DL
GLUCOSE SERPL-MCNC: 106 MG/DL (ref 65–99)
HBA1C MFR BLD: 5.8 % (ref 4.8–5.6)
POTASSIUM SERPL-SCNC: 5.5 MMOL/L (ref 3.5–5.2)
PROT SERPL-MCNC: 7.5 G/DL (ref 6–8.5)
SODIUM SERPL-SCNC: 140 MMOL/L (ref 136–145)
T4 FREE SERPL-MCNC: 1.11 NG/DL (ref 0.93–1.7)
TSH SERPL DL<=0.05 MIU/L-ACNC: 1.28 UIU/ML (ref 0.27–4.2)
VIT B12 BLD-MCNC: 410 PG/ML (ref 211–946)

## 2022-06-13 PROCEDURE — 84439 ASSAY OF FREE THYROXINE: CPT

## 2022-06-13 PROCEDURE — 83036 HEMOGLOBIN GLYCOSYLATED A1C: CPT

## 2022-06-13 PROCEDURE — 82306 VITAMIN D 25 HYDROXY: CPT

## 2022-06-13 PROCEDURE — 80050 GENERAL HEALTH PANEL: CPT

## 2022-06-13 PROCEDURE — 82746 ASSAY OF FOLIC ACID SERUM: CPT

## 2022-06-13 PROCEDURE — 82607 VITAMIN B-12: CPT

## 2022-06-13 PROCEDURE — 99215 OFFICE O/P EST HI 40 MIN: CPT | Performed by: INTERNAL MEDICINE

## 2022-06-13 RX ORDER — LORAZEPAM 0.5 MG/1
0.5 TABLET ORAL DAILY PRN
Qty: 10 TABLET | Refills: 2 | Status: SHIPPED | OUTPATIENT
Start: 2022-06-13 | End: 2022-07-25 | Stop reason: SDUPTHER

## 2022-06-13 RX ORDER — LORAZEPAM 0.5 MG/1
0.5 TABLET ORAL DAILY PRN
Qty: 10 TABLET | Refills: 2 | Status: CANCELLED | OUTPATIENT
Start: 2022-06-13

## 2022-06-14 LAB
BASOPHILS # BLD AUTO: 0.02 10*3/MM3 (ref 0–0.2)
BASOPHILS NFR BLD AUTO: 0.2 % (ref 0–1.5)
DEPRECATED RDW RBC AUTO: 39.6 FL (ref 37–54)
EOSINOPHIL # BLD AUTO: 0.01 10*3/MM3 (ref 0–0.4)
EOSINOPHIL NFR BLD AUTO: 0.1 % (ref 0.3–6.2)
ERYTHROCYTE [DISTWIDTH] IN BLOOD BY AUTOMATED COUNT: 13 % (ref 12.3–15.4)
HCT VFR BLD AUTO: 46 % (ref 37.5–51)
HGB BLD-MCNC: 16.1 G/DL (ref 13–17.7)
IMM GRANULOCYTES # BLD AUTO: 0.05 10*3/MM3 (ref 0–0.05)
IMM GRANULOCYTES NFR BLD AUTO: 0.6 % (ref 0–0.5)
LYMPHOCYTES # BLD AUTO: 0.94 10*3/MM3 (ref 0.7–3.1)
LYMPHOCYTES NFR BLD AUTO: 11.1 % (ref 19.6–45.3)
MCH RBC QN AUTO: 30.4 PG (ref 26.6–33)
MCHC RBC AUTO-ENTMCNC: 35 G/DL (ref 31.5–35.7)
MCV RBC AUTO: 86.8 FL (ref 79–97)
MONOCYTES # BLD AUTO: 0.28 10*3/MM3 (ref 0.1–0.9)
MONOCYTES NFR BLD AUTO: 3.3 % (ref 5–12)
NEUTROPHILS NFR BLD AUTO: 7.2 10*3/MM3 (ref 1.7–7)
NEUTROPHILS NFR BLD AUTO: 84.7 % (ref 42.7–76)
NRBC BLD AUTO-RTO: 0.1 /100 WBC (ref 0–0.2)
PLATELET # BLD AUTO: 257 10*3/MM3 (ref 140–450)
PMV BLD AUTO: 10.1 FL (ref 6–12)
RBC # BLD AUTO: 5.3 10*6/MM3 (ref 4.14–5.8)
WBC NRBC COR # BLD: 8.5 10*3/MM3 (ref 3.4–10.8)

## 2022-06-14 NOTE — PROGRESS NOTES
Chief Complaint   Patient presents with   • Altered Mental Status   • Chills       HPI:  Félix Brian is a 62 y.o. male who presents today for follow-up memory loss and cognitive decline.  Worsening anxiety symptoms and panic attacks over the last few weeks.  Recently started on Zoloft a few days ago and reports symptoms are significantly worse.  He is accompanied by multiple caretakers and family members.    ROS:  Constitutional: no fevers, night sweats or unexplained weight loss  Eyes: no vision changes  ENT: no runny nose, ear pain, sore throat  Cardio: no chest pain, palpitations  Pulm: no shortness of breath, wheezing, or cough  GI: no abdominal pain or changes in bowel movements  : no difficulty urinating  MSK: no difficulty ambulating, no joint pain  Neuro: no weakness, dizziness or headache  Psych: no trouble sleeping  Endo: no change in appetite      Past Medical History:   Diagnosis Date   • Arthritis    • Boston esophagus    • Electrocution 02/2021   • GERD (gastroesophageal reflux disease)    • Hypertension    • Memory loss       Family History   Problem Relation Age of Onset   • Cancer Mother    • No Known Problems Father    • Diabetes Brother    • Cancer Maternal Grandmother    • Cancer Maternal Grandfather    • Cancer Paternal Grandmother    • Cancer Paternal Grandfather       Social History     Socioeconomic History   • Marital status:    Tobacco Use   • Smoking status: Never Smoker   • Smokeless tobacco: Never Used   Vaping Use   • Vaping Use: Never used   Substance and Sexual Activity   • Alcohol use: Yes     Comment: 3 x wk    • Drug use: Yes     Types: Marijuana   • Sexual activity: Defer      Allergies   Allergen Reactions   • Ativan [Lorazepam] Mental Status Change   • Buspar [Buspirone] Mental Status Change        There is no immunization history on file for this patient.     PE:  Vitals:    06/13/22 1233   BP: 132/90   Pulse: 78   Temp: 97.7 °F (36.5 °C)   SpO2: 98%       Body mass index is 29.16 kg/m².    Gen Appearance: NAD  HEENT: Normocephalic, PERRLA, no thyromegaly, trache midline  Heart: RRR, normal S1 and S2, no murmur  Lungs: CTA b/l, no wheezing, no crackles  Abdomen: Soft, non-tender, non-distended, no guarding and BSx4  MSK: Moves all extremities well, normal gait, no peripheral edema  Pulses: Palpable and equal b/l  Lymph nodes: No palpable lymphadenopathy   Neuro: No focal deficits      Current Outpatient Medications   Medication Sig Dispense Refill   • hydrOXYzine (ATARAX) 50 MG tablet Take 1 tablet by mouth At Night As Needed for Anxiety. 30 tablet 2   • LORazepam (ATIVAN) 0.5 MG tablet Take 1 tablet by mouth Daily As Needed for Anxiety. 10 tablet 2     No current facility-administered medications for this visit.      I do suspect anxiety and depression is playing a role in cognitive impairment.  He is unable to tolerate serotonergic meds.  Refill lorazepam for panic attacks only.  Patient is trying to use these sparingly.  Has EEG scheduled with neurology and also neuropsych testing in October.  Discussed evaluation at [facility].  Rechecking labs today.  Patient would like to discontinue Zoloft, recommend trial of at least 2 weeks to see if this is helpful with anxiety depression.    Counseling was given to patient, family and caretaker for the following topics: diagnostic results, instructions for management, impressions and risks and benefits of treatment options . Total time of the encounter was 40 minutes and 21 minutes was spent face to face counseling.    Diagnoses and all orders for this visit:    1. Neurodegenerative cognitive impairment (HCC) (Primary)  -     CBC & Differential; Future  -     Comprehensive Metabolic Panel; Future  -     Hemoglobin A1c; Future  -     TSH+Free T4; Future  -     Urinalysis With Culture If Indicated - Urine, Clean Catch; Future  -     Vitamin D 25 Hydroxy; Future  -     Vitamin B12; Future  -     Folate; Future    2.  Altered mental status, unspecified altered mental status type  -     CBC & Differential; Future  -     Comprehensive Metabolic Panel; Future  -     Hemoglobin A1c; Future  -     TSH+Free T4; Future  -     Urinalysis With Culture If Indicated - Urine, Clean Catch; Future  -     Vitamin D 25 Hydroxy; Future  -     Vitamin B12; Future  -     Folate; Future    3. Anxiety and depression  -     LORazepam (ATIVAN) 0.5 MG tablet; Take 1 tablet by mouth Daily As Needed for Anxiety.  Dispense: 10 tablet; Refill: 2         No follow-ups on file.     Dictated Utilizing Dragon Dictation    Please note that portions of this note were completed with a voice recognition program.    Part of this note may be an electronic transcription/translation of spoken language to printed text using the Dragon Dictation System.

## 2022-06-22 ENCOUNTER — PRIOR AUTHORIZATION (OUTPATIENT)
Dept: FAMILY MEDICINE CLINIC | Facility: CLINIC | Age: 62
End: 2022-06-22

## 2022-07-21 DIAGNOSIS — F41.9 ANXIETY AND DEPRESSION: ICD-10-CM

## 2022-07-21 DIAGNOSIS — F32.A ANXIETY AND DEPRESSION: ICD-10-CM

## 2022-07-22 RX ORDER — LORAZEPAM 0.5 MG/1
TABLET ORAL
Qty: 10 TABLET | Refills: 0 | OUTPATIENT
Start: 2022-07-22

## 2022-07-25 ENCOUNTER — TELEPHONE (OUTPATIENT)
Dept: FAMILY MEDICINE CLINIC | Facility: CLINIC | Age: 62
End: 2022-07-25

## 2022-07-25 DIAGNOSIS — F32.A ANXIETY AND DEPRESSION: ICD-10-CM

## 2022-07-25 DIAGNOSIS — F41.9 ANXIETY AND DEPRESSION: ICD-10-CM

## 2022-07-25 NOTE — TELEPHONE ENCOUNTER
Caller: SARAH CONTEH    Relationship: Emergency Contact    Best call back number: [phone]    Requested Prescriptions:   Requested Prescriptions     Pending Prescriptions Disp Refills   • LORazepam (ATIVAN) 0.5 MG tablet 10 tablet 2     Sig: Take 1 tablet by mouth Daily As Needed for Anxiety.        Pharmacy where request should be sent: [facility] [address] [place] [address] [phone] [facility] [phone] FX     Additional details provided by patient: PATIENTS SISTER CALLED TO FOLLOW UP ON REFILL STATUS.    STATED PATIENT IS OUT OF HIS PRESCRIPTION AND THAT HE IS DOING REALLY WELL AND GOING TO WORK NOW.      Does the patient have less than a 3 day supply:  [x] Yes  [] No    Glen Coe Rep   07/25/22 13:55 EDT

## 2022-07-26 DIAGNOSIS — F41.9 ANXIETY AND DEPRESSION: ICD-10-CM

## 2022-07-26 DIAGNOSIS — F32.A ANXIETY AND DEPRESSION: ICD-10-CM

## 2022-07-26 RX ORDER — LORAZEPAM 0.5 MG/1
0.5 TABLET ORAL DAILY PRN
Qty: 10 TABLET | Refills: 2 | Status: SHIPPED | OUTPATIENT
Start: 2022-07-26 | End: 2022-08-26

## 2022-07-26 NOTE — TELEPHONE ENCOUNTER
Patients sister calling in regards to medication, wanting to know if we can get that sent over since he has ran out.

## 2022-07-27 RX ORDER — LORAZEPAM 0.5 MG/1
TABLET ORAL
Qty: 10 TABLET | Refills: 0 | OUTPATIENT
Start: 2022-07-27

## 2022-08-09 NOTE — PROGRESS NOTES
"        [facility] Note    Subjective     Patient: Félix Brian  : 1960    Primary Care Provider: Nik Bolaños DO    Requesting Provider: As above    Follow-up (EMG Right upper extremity 21)      History    Chief Complaint: Follow-up right upper extremity EMG    History of Present Illness: Patient returns today for follow-up of his right upper extremity EMG on 2021.  He reports he has symptoms of pain along the radial aspect of the thumb as well as a burning pain on the dorsum of the hand.  At last visit, he was complaining of pain and numbness and tingling in the median nerve distribution.  This all began following an episode of \"electrocution.\"  He is here for further evaluation.    Current Outpatient Medications on File Prior to Visit   Medication Sig Dispense Refill   • amLODIPine (NORVASC) 5 MG tablet Take 1 tablet by mouth Daily. 30 tablet 3   • Cholecalciferol (Vitamin D-3) 125 MCG (5000 UT) tablet Take 1 tablet by mouth Daily. 90 tablet 1   • hydrOXYzine (ATARAX) 25 MG tablet TAKE 2 TABLETS BY MOUTH EVERY 6 HOURS AS NEEDED FOR ANXIETY 30 tablet 0   • meloxicam (MOBIC) 7.5 MG tablet 1 Oral Daily with food. 30 tablet 1   • methylPREDNISolone (MEDROL) 4 MG dose pack Take as directed on package instructions. 21 each 0   • omeprazole (priLOSEC) 40 MG capsule Take 40 mg by mouth Daily.     • Vitamin D, Cholecalciferol, (CHOLECALCIFEROL) 10 MCG (400 UNIT) tablet Take 400 Units by mouth Daily.       No current facility-administered medications on file prior to visit.      No Known Allergies   Past Medical History:   Diagnosis Date   • Boston esophagus      Past Surgical History:   Procedure Laterality Date   • ENDOSCOPY       Family History   Problem Relation Age of Onset   • Cancer Mother    • No Known Problems Father    • Diabetes Brother    • Cancer Maternal Grandmother    • Cancer Maternal Grandfather    • Cancer Paternal Grandmother    • Cancer Paternal " "Grandfather       Social History     Socioeconomic History   • Marital status:      Spouse name: Not on file   • Number of children: Not on file   • Years of education: Not on file   • Highest education level: Not on file   Tobacco Use   • Smoking status: Never Smoker   • Smokeless tobacco: Never Used   Substance and Sexual Activity   • Alcohol use: No   • Drug use: No   • Sexual activity: Defer        Review of Systems   Constitutional: Negative.    HENT: Negative.    Eyes: Negative.    Respiratory: Negative.    Cardiovascular: Negative.    Gastrointestinal: Negative.    Endocrine: Negative.    Genitourinary: Negative.    Musculoskeletal: Positive for arthralgias.   Skin: Negative.    Allergic/Immunologic: Negative.    Neurological: Negative.    Hematological: Negative.    Psychiatric/Behavioral: Negative.        The following portions of the patient's history were reviewed and updated as appropriate: allergies, current medications, past family history, past medical history, past social history, past surgical history and problem list.      Objective      Physical Exam  /80   Pulse 76   Ht 185 cm (72.84\")   Wt 99.8 kg (220 lb)   BMI 29.16 kg/m²     Body mass index is 29.16 kg/m².    Patient is well developed, well nourished and in no acute distress.  Alert and oriented x 3.    Ortho Exam  Right hand and wrist exam: Exquisitely tender over the radial styloid with positive Finkelstein's.  Patient is able to make a composite fist with good strength EPL intrinsics intact.  Sensation intact to light touch.  Neurovascular intact distally pulses 2+.    Medical Decision Making    Data Review:   Reviewed EMG from 6/2/2021 of right upper extremity    Assessment:  1. Tendinitis, de Quervain's    2. Right hand pain    3. Neuropathy of right hand        Plan:   Right de Quervain's tendinitis.  Patient is exquisitely quizzically tender over the radial styloid with positive Finkelstein's.  I think some of his pain " and functional rotations are secondary to de Quervain's tendinosis and tenosynovitis.  We discussed treatment options including thumb spica splint as well as steroid injection into the radial styloid.  I explained that this is a separate problem from the burning he is having on the dorsum of his foot as well as the carpal tunnel and ulnar neuropathy seen on EMG.  I discussed the burning on the dorsum of his hand with Dr. Harrison.  We discussed the fact that after an electrocution injury things can be difficult to diagnose and treat.  His EMG do not did show mild median neuropathy and ulnar neuropathy in the right upper extremity.  Recommendation today is cortisone injection into the first dorsal compartment followed by thumb spica splint and topical Voltaren gel.  I will put in a referral to neurology for him to be evaluated for the burning on the dorsum of his hand.  He will return to see us as needed.    I discussed with the patient the potential benefits of performing a therapeutic injection of the right 1st dorsal compartment as well as potential risks including but not limited to infection, swelling, pain, bleeding, bruising, nerve/vessel damage, skin color changes, transient elevation in blood glucose levels, and fat atrophy. After informed consent and verifying correct patient, procedure site, and type of procedure, the area was prepped with Hibiclens, ethyl chloride was used to numb the skin. Using a short 25 g needle, 1cc of 1% lidocaine and  20mg/ml of Kenalog were injected into the right 1st dorsal compartment. The patient tolerated the procedure well. There were no complications.         History, diagnosis and treatment plan discussed with Dr. Harrison.        Pam Durán PA-C  06/18/21  13:48 EDT     No

## 2022-08-26 DIAGNOSIS — F41.9 ANXIETY AND DEPRESSION: ICD-10-CM

## 2022-08-26 DIAGNOSIS — F32.A ANXIETY AND DEPRESSION: ICD-10-CM

## 2022-08-26 RX ORDER — LORAZEPAM 0.5 MG/1
TABLET ORAL
Qty: 10 TABLET | Refills: 0 | Status: SHIPPED | OUTPATIENT
Start: 2022-08-26 | End: 2022-09-06 | Stop reason: SDUPTHER

## 2022-08-26 NOTE — TELEPHONE ENCOUNTER
Caller: SARAH CONTEH    Relationship: Emergency Contact    Best call back number:  [phone]    Requested Prescriptions:   Requested Prescriptions     Pending Prescriptions Disp Refills   • LORazepam (ATIVAN) 0.5 MG tablet [Pharmacy Med Name: LORazepam 0.5 MG Oral Tablet] 10 tablet 0     Sig: TAKE 1 TABLET BY MOUTH ONCE DAILY AS NEEDED FOR ANXIETY        Pharmacy where request should be sent: [facility] [address] [place] [address] [phone] [facility] [phone] FX     Additional details provided by patient:     Does the patient have less than a 3 day supply:  [x] Yes  [] No

## 2022-08-26 NOTE — TELEPHONE ENCOUNTER
Rx Refill Note  Requested Prescriptions     Pending Prescriptions Disp Refills   • LORazepam (ATIVAN) 0.5 MG tablet [Pharmacy Med Name: LORazepam 0.5 MG Oral Tablet] 10 tablet 0     Sig: TAKE 1 TABLET BY MOUTH ONCE DAILY AS NEEDED FOR ANXIETY      Last office visit with prescribing clinician: 6/13/2022      Next office visit with prescribing clinician: Visit date not found            Domingo Olivo MA  08/26/22, 10:58 EDT     CSA 12/6/21  UDS

## 2022-09-06 DIAGNOSIS — F32.A ANXIETY AND DEPRESSION: ICD-10-CM

## 2022-09-06 DIAGNOSIS — F41.9 ANXIETY AND DEPRESSION: ICD-10-CM

## 2022-09-06 NOTE — TELEPHONE ENCOUNTER
Rx Refill Note  Requested Prescriptions     Pending Prescriptions Disp Refills   • LORazepam (ATIVAN) 0.5 MG tablet 10 tablet 0     Sig: Take 1 tablet by mouth Daily As Needed for Anxiety.      Last office visit with prescribing clinician: 6/13/2022      Next office visit with prescribing clinician: 9/16/2022     anny 8-22-21  chacho 12-6-21       Aubree Mckeon MA  09/06/22, 14:16 EDT

## 2022-09-06 NOTE — TELEPHONE ENCOUNTER
Caller: SARAH CONTEH    Relationship: Emergency Contact    Best call back number: [phone]    Requested Prescriptions:   Requested Prescriptions     Pending Prescriptions Disp Refills   • LORazepam (ATIVAN) 0.5 MG tablet 10 tablet 0     Sig: Take 1 tablet by mouth Daily As Needed for Anxiety.        Pharmacy where request should be sent: [facility] [address] [place] [address] [phone] [facility] [phone] FX     Additional details provided by patient: OUT OF THIS MEDICATION    Does the patient have less than a 3 day supply:  [x] Yes  [] No    Glen Stoll Rep   09/06/22 13:58 EDT

## 2022-09-07 RX ORDER — LORAZEPAM 0.5 MG/1
0.5 TABLET ORAL DAILY PRN
Qty: 10 TABLET | Refills: 0 | Status: SHIPPED | OUTPATIENT
Start: 2022-09-07 | End: 2022-09-14 | Stop reason: SDUPTHER

## 2022-09-14 DIAGNOSIS — F41.9 ANXIETY AND DEPRESSION: ICD-10-CM

## 2022-09-14 DIAGNOSIS — F32.A ANXIETY AND DEPRESSION: ICD-10-CM

## 2022-09-14 RX ORDER — HYDROXYZINE 50 MG/1
50 TABLET, FILM COATED ORAL NIGHTLY PRN
Qty: 30 TABLET | Refills: 2 | Status: SHIPPED | OUTPATIENT
Start: 2022-09-14 | End: 2022-10-08 | Stop reason: SDUPTHER

## 2022-09-14 RX ORDER — LORAZEPAM 0.5 MG/1
0.5 TABLET ORAL DAILY PRN
Qty: 10 TABLET | Refills: 0 | Status: SHIPPED | OUTPATIENT
Start: 2022-09-14 | End: 2022-09-22 | Stop reason: SDUPTHER

## 2022-09-14 NOTE — TELEPHONE ENCOUNTER
Rx Refill Note  Requested Prescriptions     Pending Prescriptions Disp Refills   • hydrOXYzine (ATARAX) 50 MG tablet 30 tablet 2     Sig: Take 1 tablet by mouth At Night As Needed for Anxiety.   • LORazepam (ATIVAN) 0.5 MG tablet 10 tablet 0     Sig: Take 1 tablet by mouth Daily As Needed for Anxiety.      Last office visit with prescribing clinician: 6/13/2022      Next office visit with prescribing clinician: 9/16/2022            Lanette La MA  09/14/22, 12:19 EDT

## 2022-09-14 NOTE — TELEPHONE ENCOUNTER
Caller: SHARDA COX    Relationship: Emergency Contact    Best call back number:  [phone]     Requested Prescriptions:   Requested Prescriptions     Pending Prescriptions Disp Refills   • hydrOXYzine (ATARAX) 50 MG tablet 30 tablet 2     Sig: Take 1 tablet by mouth At Night As Needed for Anxiety.   • LORazepam (ATIVAN) 0.5 MG tablet 10 tablet 0     Sig: Take 1 tablet by mouth Daily As Needed for Anxiety.        Pharmacy where request should be sent:   [facility] [address]     Additional details provided by patient:  PATIENT HAS AN APPOINTMENT  ON Friday 9-16-22 AND IS REQUESTING ENOUGH MEDICATION TO GET HIM THROUGH UNTIL THEN   HE CURRENTLY HAS 1 DAY LEFT OF THE MEDICATION     Does the patient have less than a 3 day supply:  [x] Yes  [] No

## 2022-09-16 ENCOUNTER — OFFICE VISIT (OUTPATIENT)
Dept: FAMILY MEDICINE CLINIC | Facility: CLINIC | Age: 62
End: 2022-09-16

## 2022-09-16 VITALS
BODY MASS INDEX: 29.03 KG/M2 | SYSTOLIC BLOOD PRESSURE: 118 MMHG | OXYGEN SATURATION: 98 % | HEART RATE: 80 BPM | DIASTOLIC BLOOD PRESSURE: 84 MMHG | WEIGHT: 219 LBS | HEIGHT: 73 IN

## 2022-09-16 DIAGNOSIS — R41.3 MEMORY LOSS: ICD-10-CM

## 2022-09-16 DIAGNOSIS — R41.9 NEUROCOGNITIVE DISORDER: Primary | ICD-10-CM

## 2022-09-16 DIAGNOSIS — F32.A ANXIETY AND DEPRESSION: ICD-10-CM

## 2022-09-16 DIAGNOSIS — F41.9 ANXIETY AND DEPRESSION: ICD-10-CM

## 2022-09-16 PROCEDURE — 99214 OFFICE O/P EST MOD 30 MIN: CPT | Performed by: INTERNAL MEDICINE

## 2022-09-17 NOTE — PROGRESS NOTES
Chief Complaint   Patient presents with   • Follow-up     Following up with medication.   • Memory Loss     Feeling very faint, having trouble remembering.    • Insomnia     Not being able to sleep waking up with night sweats       HPI:  Félix Brian is a 62 y.o. male who presents today for follow-up neurocognitive disorder.  Continues to take Ativan as needed.    ROS:  Constitutional: no fevers, night sweats or unexplained weight loss  Eyes: no vision changes  ENT: no runny nose, ear pain, sore throat  Cardio: no chest pain, palpitations  Pulm: no shortness of breath, wheezing, or cough  GI: no abdominal pain or changes in bowel movements  : no difficulty urinating  MSK: no difficulty ambulating, no joint pain  Neuro: no weakness, dizziness or headache  Psych: no trouble sleeping  Endo: no change in appetite      Past Medical History:   Diagnosis Date   • Arthritis    • Boston esophagus    • Electrocution 02/2021   • GERD (gastroesophageal reflux disease)    • Hypertension    • Memory loss       Family History   Problem Relation Age of Onset   • Cancer Mother    • No Known Problems Father    • Diabetes Brother    • Cancer Maternal Grandmother    • Cancer Maternal Grandfather    • Cancer Paternal Grandmother    • Cancer Paternal Grandfather       Social History     Socioeconomic History   • Marital status:    Tobacco Use   • Smoking status: Never Smoker   • Smokeless tobacco: Never Used   Vaping Use   • Vaping Use: Never used   Substance and Sexual Activity   • Alcohol use: Yes     Comment: 3 x wk    • Drug use: Yes     Types: Marijuana   • Sexual activity: Defer      Allergies   Allergen Reactions   • Ativan [Lorazepam] Mental Status Change   • Buspar [Buspirone] Mental Status Change        There is no immunization history on file for this patient.     PE:  Vitals:    09/16/22 1515   BP: 118/84   Pulse: 80   SpO2: 98%      Body mass index is 28.9 kg/m².    Gen Appearance: NAD  HEENT:  Normocephalic, PERRLA, no thyromegaly, trache midline  Heart: RRR, normal S1 and S2, no murmur  Lungs: CTA b/l, no wheezing, no crackles  Abdomen: Soft, non-tender, non-distended, no guarding and BSx4  MSK: Moves all extremities well, normal gait, no peripheral edema  Pulses: Palpable and equal b/l  Lymph nodes: No palpable lymphadenopathy   Neuro: No focal deficits      Current Outpatient Medications   Medication Sig Dispense Refill   • hydrOXYzine (ATARAX) 50 MG tablet Take 1 tablet by mouth At Night As Needed for Anxiety. 30 tablet 2   • LORazepam (ATIVAN) 0.5 MG tablet Take 1 tablet by mouth Daily As Needed for Anxiety. 10 tablet 0     No current facility-administered medications for this visit.      No change in prior symptoms.  Discussed multiple upcoming appointments for neuropsych testing with patient.  Recommend close follow-up with neurology.  Information given about all upcoming appointments.  [facility] testing for anxiety and depression treatment.  Refill Ativan as needed.  Recommend limiting Ativan use is much as possible.    Counseling was given to patient and caretaker for the following topics: instructions for management, impressions, risks and benefits of treatment options and importance of treatment compliance . Total time of the encounter was 30 minutes and 15 minutes was spent face to face counseling.        Diagnoses and all orders for this visit:    1. Neurocognitive disorder (Primary)    2. Anxiety and depression    3. Memory loss         No follow-ups on file.     Dictated Utilizing Dragon Dictation    Please note that portions of this note were completed with a voice recognition program.    Part of this note may be an electronic transcription/translation of spoken language to printed text using the Dragon Dictation System.

## 2022-09-22 DIAGNOSIS — F41.9 ANXIETY AND DEPRESSION: ICD-10-CM

## 2022-09-22 DIAGNOSIS — F32.A ANXIETY AND DEPRESSION: ICD-10-CM

## 2022-09-22 RX ORDER — LORAZEPAM 0.5 MG/1
0.5 TABLET ORAL DAILY PRN
Qty: 10 TABLET | Refills: 0 | Status: SHIPPED | OUTPATIENT
Start: 2022-09-22 | End: 2022-10-03 | Stop reason: SDUPTHER

## 2022-09-22 NOTE — TELEPHONE ENCOUNTER
Rx Refill Note  Requested Prescriptions     Pending Prescriptions Disp Refills   • LORazepam (ATIVAN) 0.5 MG tablet 10 tablet 0     Sig: Take 1 tablet by mouth Daily As Needed for Anxiety.      Last office visit with prescribing clinician: 9/16/2022      Next office visit with prescribing clinician: Visit date not found            Tatiana Corral MA  09/22/22, 12:24 EDT       CSA 12/06/2021    UDS NONE

## 2022-09-22 NOTE — TELEPHONE ENCOUNTER
Caller: SHARDA COX    Relationship: Emergency Contact    Best call back number:[phone]    Requested Prescriptions:   Requested Prescriptions     Pending Prescriptions Disp Refills   • LORazepam (ATIVAN) 0.5 MG tablet 10 tablet 0     Sig: Take 1 tablet by mouth Daily As Needed for Anxiety.        Pharmacy where request should be sent: [facility] [address] [place] [address] [phone] [facility] [phone] FX     Additional details provided by patient:   PATIENT'S (WIFE) SHARDA STATED THAT PATIENT HAD A SWAB TEST DONE 09/16/2022 AND WAS WAITING ON RESULTS TO BE INFORMED IF THIS WAS MEDICATION WAS GOING TO BE CHANGED OF NOT. SHARDA STATED THAT PATIENT IS COMPLETELY OUT OF MEDICATION AND WOULD LIKE FOR MEDICATION TO BE REFILLED IN THE MEANTIME UNTIL RESULTS COME BACK FROM THE SWAB TEST TO BE INFORMED IF GOING TO STAY ON MEDICATION OR HAVE IT CHANGED     Does the patient have less than a 3 day supply:  [x] Yes  [] No    Glen Mckenzie Rep   09/22/22 12:20 EDT

## 2022-09-24 ENCOUNTER — TELEPHONE (OUTPATIENT)
Dept: FAMILY MEDICINE CLINIC | Facility: CLINIC | Age: 62
End: 2022-09-24

## 2022-09-24 NOTE — TELEPHONE ENCOUNTER
Patient's sister calling in regards to LORazepam (ATIVAN) 0.5 MG tablet. States provider sent in medication since patient was out and the pharmacy is needing us to call to give verbal authorization since they state the patient can not  until Monday, 09/26/2022. Please advise

## 2022-10-03 ENCOUNTER — PATIENT MESSAGE (OUTPATIENT)
Dept: FAMILY MEDICINE CLINIC | Facility: CLINIC | Age: 62
End: 2022-10-03

## 2022-10-03 ENCOUNTER — PRIOR AUTHORIZATION (OUTPATIENT)
Dept: FAMILY MEDICINE CLINIC | Facility: CLINIC | Age: 62
End: 2022-10-03

## 2022-10-03 ENCOUNTER — TELEPHONE (OUTPATIENT)
Dept: FAMILY MEDICINE CLINIC | Facility: CLINIC | Age: 62
End: 2022-10-03

## 2022-10-03 DIAGNOSIS — F32.A ANXIETY AND DEPRESSION: ICD-10-CM

## 2022-10-03 DIAGNOSIS — F41.9 ANXIETY AND DEPRESSION: ICD-10-CM

## 2022-10-03 RX ORDER — LORAZEPAM 0.5 MG/1
0.5 TABLET ORAL DAILY PRN
Qty: 10 TABLET | Refills: 0 | Status: SHIPPED | OUTPATIENT
Start: 2022-10-03 | End: 2022-10-12 | Stop reason: SDUPTHER

## 2022-10-03 RX ORDER — BUPROPION HYDROCHLORIDE 150 MG/1
150 TABLET ORAL DAILY
Qty: 30 TABLET | Refills: 5 | Status: SHIPPED | OUTPATIENT
Start: 2022-10-03 | End: 2022-12-13

## 2022-10-03 NOTE — TELEPHONE ENCOUNTER
Rx Refill Note  Requested Prescriptions     Pending Prescriptions Disp Refills   • LORazepam (ATIVAN) 0.5 MG tablet 10 tablet 0     Sig: Take 1 tablet by mouth Daily As Needed for Anxiety.      Last office visit with prescribing clinician: 9/16/2022      Next office visit with prescribing clinician: Visit date not found            Tatiana Corral MA  10/03/22, 10:51 EDT

## 2022-10-06 NOTE — TELEPHONE ENCOUNTER
"Good morning -  Ramesh will not take the wellbutrin \"because he tried that one before\" ~ I am working with him on a medication from the genetics list you provided and will let you know asap which he is willing to try.   Also he's asking for a refill of his hydroxine if that's possible.  And lastly, here is the new pharmacy we will be using - it's much closer and convenient for us.  Thank you so much for all your help!  Tiffany Kilpatrick (sister) [phone].     [facility]  [address], [place]  [phone]      "

## 2022-10-07 RX ORDER — HYDROXYZINE 50 MG/1
50 TABLET, FILM COATED ORAL NIGHTLY PRN
Qty: 30 TABLET | Refills: 2 | OUTPATIENT
Start: 2022-10-07

## 2022-10-07 NOTE — TELEPHONE ENCOUNTER
Caller: SARAH CONTEH    Relationship: Emergency Contact    Best call back number: [phone]    Requested Prescriptions:   Requested Prescriptions     Pending Prescriptions Disp Refills   • hydrOXYzine (ATARAX) 50 MG tablet 30 tablet 2     Sig: Take 1 tablet by mouth At Night As Needed for Anxiety.      Pharmacy where request should be sent: [id number] [address]  - [phone]  - [phone] FX     Additional details provided by patient:     PATIENT HAS APPROXIMATELY A 1-2 DAY SUPPLY LEFT.     Does the patient have less than a 3 day supply:  [x] Yes  [] No    Glen Hidalgo Rep   10/07/22 13:06 EDT

## 2022-10-08 ENCOUNTER — PATIENT MESSAGE (OUTPATIENT)
Dept: FAMILY MEDICINE CLINIC | Facility: CLINIC | Age: 62
End: 2022-10-08

## 2022-10-08 RX ORDER — HYDROXYZINE 50 MG/1
50 TABLET, FILM COATED ORAL NIGHTLY PRN
Qty: 30 TABLET | Refills: 2 | Status: SHIPPED | OUTPATIENT
Start: 2022-10-08 | End: 2022-11-24 | Stop reason: SDUPTHER

## 2022-10-12 DIAGNOSIS — F41.9 ANXIETY AND DEPRESSION: ICD-10-CM

## 2022-10-12 DIAGNOSIS — F32.A ANXIETY AND DEPRESSION: ICD-10-CM

## 2022-10-12 NOTE — TELEPHONE ENCOUNTER
Rx Refill Note  Requested Prescriptions     Pending Prescriptions Disp Refills   • LORazepam (ATIVAN) 0.5 MG tablet 10 tablet 0     Sig: Take 1 tablet by mouth Daily As Needed for Anxiety.      Last office visit with prescribing clinician: 9/16/2022      Next office visit with prescribing clinician: Visit date not found      uds 8-22-21  csa 12-6-21  {TIP  Please add Last Relevant Lab Date if appropriate:23}     Aubree Mckeon MA  10/12/22, 08:58 EDT

## 2022-10-13 ENCOUNTER — TELEPHONE (OUTPATIENT)
Dept: FAMILY MEDICINE CLINIC | Facility: CLINIC | Age: 62
End: 2022-10-13

## 2022-10-13 DIAGNOSIS — Z12.11 ENCOUNTER FOR COLORECTAL CANCER SCREENING: Primary | ICD-10-CM

## 2022-10-13 DIAGNOSIS — Z12.12 ENCOUNTER FOR COLORECTAL CANCER SCREENING: Primary | ICD-10-CM

## 2022-10-13 RX ORDER — LORAZEPAM 0.5 MG/1
0.5 TABLET ORAL DAILY PRN
Qty: 30 TABLET | Refills: 2 | Status: SHIPPED | OUTPATIENT
Start: 2022-10-13 | End: 2022-12-07 | Stop reason: SDUPTHER

## 2022-10-13 NOTE — TELEPHONE ENCOUNTER
Attempted to contact pt, spoke with pt's sister ( verbal reviewed). Informed her that her brother is overdue for his colorectal cancer screening, she states she has had that discussion with him in the past & request an order for cologuard be put in. Order has been put in the system.

## 2022-11-29 RX ORDER — HYDROXYZINE 50 MG/1
50 TABLET, FILM COATED ORAL NIGHTLY PRN
Qty: 30 TABLET | Refills: 2 | Status: SHIPPED | OUTPATIENT
Start: 2022-11-29 | End: 2023-02-28 | Stop reason: SDUPTHER

## 2022-11-29 NOTE — TELEPHONE ENCOUNTER
Rx Refill Note  Requested Prescriptions     Pending Prescriptions Disp Refills   • hydrOXYzine (ATARAX) 50 MG tablet 30 tablet 2     Sig: Take 1 tablet by mouth At Night As Needed for Anxiety.      Last office visit with prescribing clinician: 9/16/2022   Last telemedicine visit with prescribing clinician: Visit date not found   Next office visit with prescribing clinician: Visit date not found                         Would you like a call back once the refill request has been completed: [] Yes [] No    If the office needs to give you a call back, can they leave a voicemail: [] Yes [] No    Domingo Olivo MA  11/29/22, 09:08 EST

## 2022-12-07 DIAGNOSIS — F41.9 ANXIETY AND DEPRESSION: ICD-10-CM

## 2022-12-07 DIAGNOSIS — F32.A ANXIETY AND DEPRESSION: ICD-10-CM

## 2022-12-07 NOTE — TELEPHONE ENCOUNTER
Rx Refill Note  Requested Prescriptions     Pending Prescriptions Disp Refills   • LORazepam (ATIVAN) 0.5 MG tablet 30 tablet 2     Sig: Take 1 tablet by mouth Daily As Needed for Anxiety.      Last office visit with prescribing clinician: 9/16/2022   Last telemedicine visit with prescribing clinician: Visit date not found   Next office visit with prescribing clinician: Visit date not found                         Would you like a call back once the refill request has been completed: [] Yes [] No    If the office needs to give you a call back, can they leave a voicemail: [] Yes [] No    Marcel Vázquez MA  12/07/22, 15:59 EST

## 2022-12-09 RX ORDER — LORAZEPAM 0.5 MG/1
0.5 TABLET ORAL DAILY PRN
Qty: 30 TABLET | Refills: 2 | Status: SHIPPED | OUTPATIENT
Start: 2022-12-09 | End: 2023-01-18 | Stop reason: SINTOL

## 2022-12-13 ENCOUNTER — OFFICE VISIT (OUTPATIENT)
Dept: NEUROLOGY | Facility: CLINIC | Age: 62
End: 2022-12-13

## 2022-12-13 VITALS
DIASTOLIC BLOOD PRESSURE: 76 MMHG | BODY MASS INDEX: 29.03 KG/M2 | WEIGHT: 219 LBS | HEIGHT: 73 IN | SYSTOLIC BLOOD PRESSURE: 126 MMHG | OXYGEN SATURATION: 96 % | HEART RATE: 75 BPM

## 2022-12-13 DIAGNOSIS — G31.84 MILD COGNITIVE IMPAIRMENT: Primary | ICD-10-CM

## 2022-12-13 PROCEDURE — 99215 OFFICE O/P EST HI 40 MIN: CPT | Performed by: PHYSICIAN ASSISTANT

## 2022-12-13 RX ORDER — DIVALPROEX SODIUM 250 MG/1
250 TABLET, EXTENDED RELEASE ORAL DAILY
Qty: 30 TABLET | Refills: 11 | Status: SHIPPED | OUTPATIENT
Start: 2022-12-13 | End: 2023-01-18

## 2022-12-13 NOTE — PROGRESS NOTES
"Subjective       Chief Complaint: memory loss      History of Present Illness   Félix Brian is a 62 y.o. male who returns to clinic today for evaluation of memory loss. He has noted symptoms since at least early 2021 marked initially by forgetfulness and word-finding difficulties. This has worsened over time, particularly after he was hospitalized at [facility] in 8/21. Additional symptoms have included impairments in orientation and executive function. There have been associated  symptoms of anxiety and depression. He denies impairments in ADL's. 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 He manages his medications.      He also reports a history of sharp left retro-orbital headaches. This has also worsened over time. He typically notes up to several headaches a daily, lasting 1-4 hours. There is associated light sensitivity and blurry vision on the left. His symptoms are worse with increased stress.    He has noted episodes of confusion which have worsened over time. These can occur up to several times a day. For example, he states that it took him 2-3 hours to drive home last week, when the route typically takes 20 minutes. He is amnestic of this time period. He also reports \" adrenaline attacks\" described as \"panic attacks on steroids\". These spells are worse with increased stress. Lorazepam is beneficial. Unfortunately, the history is otherwise unclear     Prior evaluation has included screening blood work  and an MRI of the brain which showed mild chronic small vessel ischemic changes, but was otherwise unremarkable. An EEG was also unremarkable.     He previously took donepezil 5mg daily. He was intolerant of sertraline and Wellbutrin, which he states he only took for a few days.     Today: Since his last visit in 6/22, he feels largely unchanged. He now reports that his symptoms seem to be cyclic. He states that his " "headaches typically occur once every 7-10 days and and typically last around 48 hours. These symptoms are then followed by confusion, which lasts another two days. Neuropsychological testing at  in 10/22 was c/w mild neurocognitive disorder, predominantly amnestic. He did not undergo a repeat EEG as previously planned. He was seen by Dr. Phan at , who recommend amitriptyline for his headaches, which was not started as he does not want to take an antidepressant.      I have reviewed and confirmed the past family, social and medical history as accurate on 12/13/22.     Review of Systems   Constitutional: Negative.    HENT: Negative.    Eyes: Negative.    Respiratory: Negative.    Cardiovascular: Negative.    Gastrointestinal: Negative.    Endocrine: Negative.    Genitourinary: Negative.    Musculoskeletal: Negative.    Skin: Negative.    Allergic/Immunologic: Negative.    Hematological: Negative.        Objective     /76   Pulse 75   Ht 185.4 cm (73\")   Wt 99.3 kg (219 lb)   SpO2 96%   BMI 28.89 kg/m²     General appearance today is normal.     Physical Exam  Neurological:      Mental Status: He is oriented to person, place, and time.      Cranial Nerves: Cranial nerves 2-12 are intact.      Motor: Motor strength is normal.      Coordination: Finger-Nose-Finger Test normal.   Psychiatric:         Speech: Speech normal.          Neurologic Exam     Mental Status   Oriented to person, place, and time.   Registration: recalls 3 of 3 objects. Recall at 5 minutes: recalls 2 of 3 objects. Follows 3 step commands.   Attention: normal.   Speech: speech is normal   Level of consciousness: alert  Able to name object. Able to read. Able to repeat. Able to write. Normal comprehension.     Cranial Nerves   Cranial nerves II through XII intact.     Motor Exam   Muscle bulk: normal  Overall muscle tone: normal    Strength   Strength 5/5 throughout.     Gait, Coordination, and Reflexes     Coordination   Finger to nose " coordination: normal    Tremor   Resting tremor: absent        Results  MMSE=29      Assessment & Plan   Diagnoses and all orders for this visit:    1. Memory loss (Primary)  -     EEG Awake or Drowsy Routine; Future    2. Mild cognitive impairment    Other orders  -     divalproex (Depakote ER) 250 MG 24 hr tablet; Take 1 tablet by mouth Daily.  Dispense: 30 tablet; Refill: 11          Discussion/Summary   Félix Brian returns to clinic today for evaluation of Mild Cognitive Impairment (MCI) and headaches. I remain concerned that his history of anxiety and depression, poor sleep, and some of his previous medications have been negatively affecting his cognition. I again reviewed his current status and treatment options. It was elected to obtain a repeat EEG as previously planned. After discussing potential treatment options, it was elected to add a low dose of depakote. He will then follow up in 3-4 months , or sooner if needed.   Total time of visit today: 40 minutes. As part of this visit I reviewed outside records. I also discussed diagnosis, prognosis, diagnostic testing, evaluation, current status, treatment options and management as discussed above.           Caren Alvarez PA-C

## 2022-12-17 ENCOUNTER — PATIENT MESSAGE (OUTPATIENT)
Dept: FAMILY MEDICINE CLINIC | Facility: CLINIC | Age: 62
End: 2022-12-17

## 2023-01-18 ENCOUNTER — HOSPITAL ENCOUNTER (OUTPATIENT)
Dept: GENERAL RADIOLOGY | Facility: HOSPITAL | Age: 63
Discharge: HOME | End: 2023-01-18
Admitting: INTERNAL MEDICINE
Payer: MEDICAID

## 2023-01-18 ENCOUNTER — OFFICE VISIT (OUTPATIENT)
Dept: FAMILY MEDICINE CLINIC | Facility: CLINIC | Age: 63
End: 2023-01-18
Payer: MEDICAID

## 2023-01-18 VITALS
HEART RATE: 73 BPM | OXYGEN SATURATION: 98 % | SYSTOLIC BLOOD PRESSURE: 122 MMHG | HEIGHT: 73 IN | WEIGHT: 226 LBS | BODY MASS INDEX: 29.95 KG/M2 | DIASTOLIC BLOOD PRESSURE: 86 MMHG

## 2023-01-18 DIAGNOSIS — R06.02 SHORTNESS OF BREATH: ICD-10-CM

## 2023-01-18 DIAGNOSIS — R73.03 PREDIABETES: ICD-10-CM

## 2023-01-18 DIAGNOSIS — H54.7 VISION LOSS: ICD-10-CM

## 2023-01-18 DIAGNOSIS — J43.9 PULMONARY EMPHYSEMA, UNSPECIFIED EMPHYSEMA TYPE: Primary | ICD-10-CM

## 2023-01-18 DIAGNOSIS — J43.9 PULMONARY EMPHYSEMA, UNSPECIFIED EMPHYSEMA TYPE: ICD-10-CM

## 2023-01-18 PROCEDURE — 99214 OFFICE O/P EST MOD 30 MIN: CPT | Performed by: INTERNAL MEDICINE

## 2023-01-18 PROCEDURE — 71046 X-RAY EXAM CHEST 2 VIEWS: CPT

## 2023-01-19 NOTE — PROGRESS NOTES
Chief Complaint   Patient presents with   • [facility] f/u [place] - spots on lungs would like follow up xray        HPI:  Félix Brian is a 62 y.o. male who presents today for ER follow-up pneumonia.  He would like to review prior results.    ROS:  Constitutional: no fevers, night sweats or unexplained weight loss  Eyes: no vision changes  ENT: no runny nose, ear pain, sore throat  Cardio: no chest pain, palpitations  Pulm: no shortness of breath, wheezing, or cough  GI: no abdominal pain or changes in bowel movements  : no difficulty urinating  MSK: no difficulty ambulating, no joint pain  Neuro: no weakness, dizziness or headache  Psych: no trouble sleeping  Endo: no change in appetite      Past Medical History:   Diagnosis Date   • Arthritis    • [place] esophagus    • Electrocution 02/2021   • GERD (gastroesophageal reflux disease)    • Hypertension    • Memory loss       Family History   Problem Relation Age of Onset   • Cancer Mother    • No Known Problems Father    • Diabetes Brother    • Cancer Maternal Grandmother    • Cancer Maternal Grandfather    • Cancer Paternal Grandmother    • Cancer Paternal Grandfather       Social History     Socioeconomic History   • Marital status:    Tobacco Use   • Smoking status: Never   • Smokeless tobacco: Never   Vaping Use   • Vaping Use: Never used   Substance and Sexual Activity   • Alcohol use: Yes     Comment: 3 x wk    • Drug use: Yes     Types: Marijuana   • Sexual activity: Defer      Allergies   Allergen Reactions   • Ativan [Lorazepam] Mental Status Change     Headaches, confusion   • Buspar [Buspirone] Mental Status Change        There is no immunization history on file for this patient.     PE:  Vitals:    01/18/23 1335   BP: 122/86   Pulse: 73   SpO2: 98%      Body mass index is 29.82 kg/m².    Gen Appearance: NAD  HEENT: Normocephalic, PERRLA, no thyromegaly, trache midline  Heart: RRR, normal S1 and S2, no murmur  Lungs: CTA b/l,  no wheezing, no crackles  Abdomen: Soft, non-tender, non-distended, no guarding and BSx4  MSK: Moves all extremities well, normal gait, no peripheral edema  Pulses: Palpable and equal b/l  Lymph nodes: No palpable lymphadenopathy   Neuro: No focal deficits      Current Outpatient Medications   Medication Sig Dispense Refill   • hydrOXYzine (ATARAX) 50 MG tablet Take 1 tablet by mouth At Night As Needed for Anxiety. 30 tablet 2   • OMEPRAZOLE PO Take  by mouth.       No current facility-administered medications for this visit.      Counseling was given to patient for the following topics: diagnostic results, instructions for management and impressions . Total time of the encounter was 30 minutes and 15 minutes was spent face to face counseling.    Diagnoses and all orders for this visit:    1. Pulmonary emphysema, unspecified emphysema type (HCC) (Primary)  -     XR Chest PA & Lateral; Future  No prior history of smoking.  Emphysema noted on prior x-ray.  Repeat x-ray today, may need CT scan depending on results.  2. Shortness of breath    3. Prediabetes  Counseled on dietary changes.  4. Vision loss  -     Ambulatory Referral to Ophthalmology         No follow-ups on file.     Dictated Utilizing Dragon Dictation    Please note that portions of this note were completed with a voice recognition program.    Part of this note may be an electronic transcription/translation of spoken language to printed text using the Dragon Dictation System.

## 2023-03-01 RX ORDER — HYDROXYZINE 50 MG/1
50 TABLET, FILM COATED ORAL NIGHTLY PRN
Qty: 30 TABLET | Refills: 2 | Status: SHIPPED | OUTPATIENT
Start: 2023-03-01

## 2023-03-30 ENCOUNTER — TELEPHONE (OUTPATIENT)
Dept: FAMILY MEDICINE CLINIC | Facility: CLINIC | Age: 63
End: 2023-03-30
Payer: MEDICAID

## 2023-03-30 NOTE — TELEPHONE ENCOUNTER
Contacted patient's sister to inquire further, she reports that patient has stopped lorazepam but is willing to retry. Requesting refills    Advised sister that PCP is out of office and will respond once he returns      Provided contact info to schedule with behavioral heath

## 2023-03-30 NOTE — TELEPHONE ENCOUNTER
Caller: YADYSARAH    Relationship: Emergency Contact    Best call back number: [phone]    What medication are you requesting: LOREAZAPAM    Have you had these symptoms before:    [] Yes  [x] No    Have you been treated for these symptoms before:   [] Yes  [x] No    If a prescription is needed, what is your preferred pharmacy and phone number: [id number] [place], [place] - [address] [facility] [phone]  - [phone]     Additional notes: PATIENT SISTER IS CALLING ASKING FOR THIS MEDICATION TO BE CALLED IN AND IS ASKING IF PATIENT CAN GET REFERRAL TO COUNSELING.

## 2023-04-04 NOTE — TELEPHONE ENCOUNTER
Attempted to contact, no answer. Left detailed voicemail relaying provider's message     Ativan is a controlled substance, he will need 3 month follow up if he wishes to resume medication.    Hub can relay and schedule as needed

## 2023-04-10 ENCOUNTER — OFFICE VISIT (OUTPATIENT)
Dept: FAMILY MEDICINE CLINIC | Facility: CLINIC | Age: 63
End: 2023-04-10
Payer: MEDICAID

## 2023-04-10 VITALS
HEART RATE: 79 BPM | OXYGEN SATURATION: 98 % | HEIGHT: 73 IN | BODY MASS INDEX: 29.82 KG/M2 | DIASTOLIC BLOOD PRESSURE: 84 MMHG | WEIGHT: 225 LBS | SYSTOLIC BLOOD PRESSURE: 120 MMHG

## 2023-04-10 DIAGNOSIS — R35.0 URINARY FREQUENCY: ICD-10-CM

## 2023-04-10 DIAGNOSIS — E55.9 VITAMIN D DEFICIENCY: ICD-10-CM

## 2023-04-10 DIAGNOSIS — Z51.81 THERAPEUTIC DRUG MONITORING: ICD-10-CM

## 2023-04-10 DIAGNOSIS — F41.1 GENERALIZED ANXIETY DISORDER: Primary | ICD-10-CM

## 2023-04-10 DIAGNOSIS — Z00.00 PREVENTATIVE HEALTH CARE: ICD-10-CM

## 2023-04-10 DIAGNOSIS — Z12.5 ENCOUNTER FOR PROSTATE CANCER SCREENING: ICD-10-CM

## 2023-04-10 DIAGNOSIS — F41.0 PANIC ATTACK: ICD-10-CM

## 2023-04-10 RX ORDER — LORAZEPAM 0.5 MG/1
0.5 TABLET ORAL DAILY PRN
Qty: 30 TABLET | Refills: 0 | Status: SHIPPED | OUTPATIENT
Start: 2023-04-10
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
## 2023-04-15 LAB
6MAM SAL CFM-MCNC: NOT DETECTED NG/ML
6MAM SAL QL CFM: NEGATIVE
ALPRAZ SAL CFM-MCNC: NOT DETECTED NG/ML
AMPHET SAL CFM-MCNC: NOT DETECTED NG/ML
AMPHET SAL QL CFM: NEGATIVE
ANTICONVULSANTS SAL QL CFM: NEGATIVE
BENZODIAZ SAL QL CFM: ABNORMAL
BUPRENORPHINE SAL CFM-MCNC: NOT DETECTED NG/ML
BUPRENORPHINE SAL QL CFM: NEGATIVE
BZE SAL CFM-MCNC: NOT DETECTED NG/ML
CANNABINOIDS SAL QL SCN: ABNORMAL
CARBOXYTHC SAL CFM-MCNC: 187 NG/ML
CARISOPRODOL SAL CFM-MCNC: NOT DETECTED NG/ML
CLONAZEPAM SAL CFM-MCNC: NOT DETECTED NG/ML
COC+MET SAL QL CFM: NEGATIVE
COCAINE SAL CFM-MCNC: NOT DETECTED NG/ML
CODEINE SAL CFM-MCNC: NOT DETECTED NG/ML
COTININE SAL CFM-MCNC: NOT DETECTED NG/ML
COTININE SAL QL CFM: NEGATIVE
CYCLOBENZAPRINE SAL CFM-MCNC: NOT DETECTED NG/ML
DHC SAL CFM-MCNC: NOT DETECTED NG/ML
DIAZEPAM SAL CFM-MCNC: NOT DETECTED NG/ML
DULOXETINE SAL CFM-MCNC: NOT DETECTED NG/ML
DULOXETINE SAL QL CFM: NEGATIVE
EDDP SAL QL CFM: NOT DETECTED NG/ML
FENTANYL SAL CFM-MCNC: NEGATIVE NG/ML
FENTANYL SAL QL SCN: NOT DETECTED NG/ML
FLUNITRAZEPAM SAL CFM-MCNC: NOT DETECTED NG/ML
FLURAZEPAM SAL CFM-MCNC: NOT DETECTED NG/ML
GABAPENTIN SAL CFM-MCNC: NOT DETECTED UG/ML
HYDROCODONE SAL CFM-MCNC: NOT DETECTED NG/ML
HYDROMORPHONE SAL CFM-MCNC: NOT DETECTED NG/ML
HYPNOTICS SAL QL CFM: NEGATIVE
LORAZEPAM SAL-MCNC: 14.5 NG/ML
MDMA SAL CFM-MCNC: NOT DETECTED NG/ML
MEPERIDINE SAL CFM-MCNC: NOT DETECTED NG/ML
MEPROBAMATE SAL CFM-MCNC: NOT DETECTED NG/ML
METHADONE SAL CFM-MCNC: NOT DETECTED NG/ML
METHADONE SAL QL CFM: NEGATIVE
METHAMPHET SAL CFM-MCNC: NOT DETECTED NG/ML
MIDAZOLAM SAL CFM-MCNC: NOT DETECTED NG/ML
MORPHINE SAL CFM-MCNC: NOT DETECTED NG/ML
MUSCLE RELAXANTS: NEGATIVE
NARCOTICS SAL QL CFM: NEGATIVE
NORBUPRENORPHINE SAL CFM-MCNC: NOT DETECTED NG/ML
NORDIAZEPAM SAL-MCNC: NOT DETECTED NG/ML
NORHYDROCODONE SAL CFM-MCNC: NOT DETECTED NG/ML
NOROXYCODONE SAL CFM-MCNC: NOT DETECTED NG/ML
OPIATES SAL QL CFM: NEGATIVE
OXAZEPAM SAL CFM-MCNC: NOT DETECTED NG/ML
OXYCODONE SAL CFM-MCNC: NOT DETECTED NG/ML
OXYCODONE SAL QL CFM: NEGATIVE
OXYMORPHONE SAL CFM-MCNC: NOT DETECTED NG/ML
PCP SAL CFM-MCNC: NOT DETECTED NG/ML
PCP SAL QL CFM: NEGATIVE
PREGABALIN SAL CFM-MCNC: NOT DETECTED UG/ML
PROPOXYPH SAL CFM-MCNC: NOT DETECTED NG/ML
TAPENTADOL SAL CFM-MCNC: NOT DETECTED NG/ML
TAPENTADOL SAL QL SCN: NEGATIVE
TEMAZEPAM SAL CFM-MCNC: NOT DETECTED NG/ML
TRAMADOL SAL CFM-MCNC: NOT DETECTED NG/ML
TRIAZOLAM SAL CFM-MCNC: NOT DETECTED NG/ML
ZOLPIDEM SAL CFM-MCNC: NOT DETECTED NG/ML

## 2023-04-20 ENCOUNTER — PATIENT MESSAGE (OUTPATIENT)
Dept: FAMILY MEDICINE CLINIC | Facility: CLINIC | Age: 63
End: 2023-04-20
Payer: MEDICAID

## 2023-04-24 ENCOUNTER — PRIOR AUTHORIZATION (OUTPATIENT)
Dept: FAMILY MEDICINE CLINIC | Facility: CLINIC | Age: 63
End: 2023-04-24
Payer: MEDICAID

## 2023-04-24 NOTE — TELEPHONE ENCOUNTER
(Key: [id number])  Med:Lorazepam (Ativan) 0.5 MG tablet  Status:sent to plan, awaiting determination  Created:04/24/2023

## 2023-05-02 ENCOUNTER — LAB (OUTPATIENT)
Dept: LAB | Facility: HOSPITAL | Age: 63
End: 2023-05-02
Payer: MEDICAID

## 2023-05-02 DIAGNOSIS — Z12.5 ENCOUNTER FOR PROSTATE CANCER SCREENING: ICD-10-CM

## 2023-05-02 DIAGNOSIS — Z00.00 PREVENTATIVE HEALTH CARE: ICD-10-CM

## 2023-05-02 DIAGNOSIS — E55.9 VITAMIN D DEFICIENCY: ICD-10-CM

## 2023-05-02 LAB
BASOPHILS # BLD AUTO: 0.03 10*3/MM3 (ref 0–0.2)
BASOPHILS NFR BLD AUTO: 0.4 % (ref 0–1.5)
DEPRECATED RDW RBC AUTO: 41.7 FL (ref 37–54)
EOSINOPHIL # BLD AUTO: 0.08 10*3/MM3 (ref 0–0.4)
EOSINOPHIL NFR BLD AUTO: 1.2 % (ref 0.3–6.2)
ERYTHROCYTE [DISTWIDTH] IN BLOOD BY AUTOMATED COUNT: 13 % (ref 12.3–15.4)
HCT VFR BLD AUTO: 45.9 % (ref 37.5–51)
HGB BLD-MCNC: 16.2 G/DL (ref 13–17.7)
IMM GRANULOCYTES # BLD AUTO: 0.02 10*3/MM3 (ref 0–0.05)
IMM GRANULOCYTES NFR BLD AUTO: 0.3 % (ref 0–0.5)
LYMPHOCYTES # BLD AUTO: 2.02 10*3/MM3 (ref 0.7–3.1)
LYMPHOCYTES NFR BLD AUTO: 29.8 % (ref 19.6–45.3)
MCH RBC QN AUTO: 30.9 PG (ref 26.6–33)
MCHC RBC AUTO-ENTMCNC: 35.3 G/DL (ref 31.5–35.7)
MCV RBC AUTO: 87.6 FL (ref 79–97)
MONOCYTES # BLD AUTO: 0.38 10*3/MM3 (ref 0.1–0.9)
MONOCYTES NFR BLD AUTO: 5.6 % (ref 5–12)
NEUTROPHILS NFR BLD AUTO: 4.24 10*3/MM3 (ref 1.7–7)
NEUTROPHILS NFR BLD AUTO: 62.7 % (ref 42.7–76)
NRBC BLD AUTO-RTO: 0 /100 WBC (ref 0–0.2)
PLATELET # BLD AUTO: 216 10*3/MM3 (ref 140–450)
PMV BLD AUTO: 10.3 FL (ref 6–12)
RBC # BLD AUTO: 5.24 10*6/MM3 (ref 4.14–5.8)
WBC NRBC COR # BLD: 6.77 10*3/MM3 (ref 3.4–10.8)

## 2023-05-02 PROCEDURE — G0103 PSA SCREENING: HCPCS

## 2023-05-02 PROCEDURE — 84439 ASSAY OF FREE THYROXINE: CPT

## 2023-05-02 PROCEDURE — 82306 VITAMIN D 25 HYDROXY: CPT

## 2023-05-02 PROCEDURE — 80061 LIPID PANEL: CPT

## 2023-05-02 PROCEDURE — 83036 HEMOGLOBIN GLYCOSYLATED A1C: CPT

## 2023-05-02 PROCEDURE — 80050 GENERAL HEALTH PANEL: CPT

## 2023-05-03 LAB
25(OH)D3 SERPL-MCNC: 20.7 NG/ML (ref 30–100)
ALBUMIN SERPL-MCNC: 4.1 G/DL (ref 3.5–5.2)
ALBUMIN/GLOB SERPL: 1.7 G/DL
ALP SERPL-CCNC: 83 U/L (ref 39–117)
ALT SERPL W P-5'-P-CCNC: 23 U/L (ref 1–41)
ANION GAP SERPL CALCULATED.3IONS-SCNC: 8.8 MMOL/L (ref 5–15)
AST SERPL-CCNC: 19 U/L (ref 1–40)
BILIRUB SERPL-MCNC: <0.2 MG/DL (ref 0–1.2)
BUN SERPL-MCNC: 9 MG/DL (ref 8–23)
BUN/CREAT SERPL: 10.2 (ref 7–25)
CALCIUM SPEC-SCNC: 9.6 MG/DL (ref 8.6–10.5)
CHLORIDE SERPL-SCNC: 105 MMOL/L (ref 98–107)
CHOLEST SERPL-MCNC: 193 MG/DL (ref 0–200)
CO2 SERPL-SCNC: 26.2 MMOL/L (ref 22–29)
CREAT SERPL-MCNC: 0.88 MG/DL (ref 0.76–1.27)
EGFRCR SERPLBLD CKD-EPI 2021: 97.2 ML/MIN/1.73
GLOBULIN UR ELPH-MCNC: 2.4 GM/DL
GLUCOSE SERPL-MCNC: 102 MG/DL (ref 65–99)
HBA1C MFR BLD: 5.8 % (ref 4.8–5.6)
HDLC SERPL-MCNC: 54 MG/DL (ref 40–60)
LDLC SERPL CALC-MCNC: 124 MG/DL (ref 0–100)
LDLC/HDLC SERPL: 2.27 {RATIO}
POTASSIUM SERPL-SCNC: 4.8 MMOL/L (ref 3.5–5.2)
PROT SERPL-MCNC: 6.5 G/DL (ref 6–8.5)
PSA SERPL-MCNC: 3.74 NG/ML (ref 0–4)
SODIUM SERPL-SCNC: 140 MMOL/L (ref 136–145)
T4 FREE SERPL-MCNC: 0.91 NG/DL (ref 0.93–1.7)
TRIGL SERPL-MCNC: 81 MG/DL (ref 0–150)
TSH SERPL DL<=0.05 MIU/L-ACNC: 1.38 UIU/ML (ref 0.27–4.2)
VLDLC SERPL-MCNC: 15 MG/DL (ref 5–40)

## 2023-05-21 DIAGNOSIS — F41.1 GENERALIZED ANXIETY DISORDER: ICD-10-CM

## 2023-05-21 DIAGNOSIS — F41.0 PANIC ATTACK: ICD-10-CM

## 2023-05-22 RX ORDER — LORAZEPAM 0.5 MG/1
0.5 TABLET ORAL DAILY PRN
Qty: 30 TABLET | Refills: 0 | OUTPATIENT
Start: 2023-05-22

## 2023-05-22 RX ORDER — HYDROXYZINE 50 MG/1
50 TABLET, FILM COATED ORAL NIGHTLY PRN
Qty: 30 TABLET | Refills: 2 | Status: SHIPPED | OUTPATIENT
Start: 2023-05-22

## 2023-05-22 NOTE — TELEPHONE ENCOUNTER
Rx Refill Note  Requested Prescriptions     Pending Prescriptions Disp Refills   • hydrOXYzine (ATARAX) 50 MG tablet [Pharmacy Med Name: HYDROXYZINE HCL 50MG TABS (WHITE)] 30 tablet 2     Sig: TAKE 1 TABLET BY MOUTH AT NIGHT AS NEEDED FOR ANXIETY      Last office visit with prescribing clinician: 4/10/2023   Last telemedicine visit with prescribing clinician: 5/21/2023   Next office visit with prescribing clinician: 7/12/2023                         Would you like a call back once the refill request has been completed: [] Yes [] No    If the office needs to give you a call back, can they leave a voicemail: [] Yes [] No    Lanette La MA  05/22/23, 10:26 EDT

## 2023-05-23 RX ORDER — LORAZEPAM 0.5 MG/1
0.5 TABLET ORAL DAILY PRN
Qty: 30 TABLET | Refills: 0 | Status: SHIPPED | OUTPATIENT
Start: 2023-05-23

## 2023-05-23 RX ORDER — HYDROXYZINE 50 MG/1
50 TABLET, FILM COATED ORAL NIGHTLY PRN
Qty: 30 TABLET | Refills: 2 | Status: SHIPPED | OUTPATIENT
Start: 2023-05-23

## 2023-06-08 ENCOUNTER — TELEPHONE (OUTPATIENT)
Dept: FAMILY MEDICINE CLINIC | Facility: CLINIC | Age: 63
End: 2023-06-08
Payer: MEDICAID

## 2023-06-08 NOTE — TELEPHONE ENCOUNTER
Contacted pt & notified him that he is due for a UA micro & he can stop by the office as a walk-in to have this done. Pt verbalized understanding and will stop by before his upcoming appt. Pt did not have any additional questions at this time.

## 2023-06-13 DIAGNOSIS — F41.0 PANIC ATTACK: ICD-10-CM

## 2023-06-13 DIAGNOSIS — F41.1 GENERALIZED ANXIETY DISORDER: ICD-10-CM

## 2023-06-14 ENCOUNTER — TELEPHONE (OUTPATIENT)
Dept: FAMILY MEDICINE CLINIC | Facility: CLINIC | Age: 63
End: 2023-06-14

## 2023-06-14 ENCOUNTER — PATIENT MESSAGE (OUTPATIENT)
Dept: FAMILY MEDICINE CLINIC | Facility: CLINIC | Age: 63
End: 2023-06-14
Payer: MEDICAID

## 2023-06-14 RX ORDER — HYDROXYZINE 50 MG/1
50 TABLET, FILM COATED ORAL NIGHTLY PRN
Qty: 30 TABLET | Refills: 2 | Status: SHIPPED | OUTPATIENT
Start: 2023-06-14

## 2023-06-14 RX ORDER — LORAZEPAM 0.5 MG/1
0.5 TABLET ORAL DAILY PRN
Qty: 30 TABLET | Refills: 0 | Status: SHIPPED | OUTPATIENT
Start: 2023-06-14

## 2023-06-14 NOTE — TELEPHONE ENCOUNTER
Caller: SARAH CONTEH    Relationship: Emergency Contact    Best call back number: [phone]     What medications are you currently taking:   Current Outpatient Medications on File Prior to Visit   Medication Sig Dispense Refill    hydrOXYzine (ATARAX) 50 MG tablet TAKE 1 TABLET BY MOUTH AT NIGHT AS NEEDED FOR ANXIETY 30 tablet 2    hydrOXYzine (ATARAX) 50 MG tablet Take 1 tablet by mouth At Night As Needed for Anxiety. 30 tablet 2    LORazepam (Ativan) 0.5 MG tablet Take 1 tablet by mouth Daily As Needed for Anxiety. 30 tablet 0    OMEPRAZOLE PO Take  by mouth.       No current facility-administered medications on file prior to visit.          When did you start taking these medications:     Which medication are you concerned about: hydrOXYzine (ATARAX) 50 MG tablet AND LORazepam (Ativan) 0.5 MG tablet    Who prescribed you this medication:     What are your concerns: PATIENTS SISTER STATES THEY REQUESTED TO GET THESE REFILLED AND IT WAS APPROVED BY DR. RUBIO BUT THE PHARMACY SAID THEY HAVE TO WAIT UNTIL FRIDAY UNTIL THEY CAN FILL IT UNLESS THE OFFICE CALLS AND TELLS THEM IT IS OK TO FILL NOW. HE IS OUT     How long have you had these concerns:

## 2023-06-14 NOTE — TELEPHONE ENCOUNTER
Contacted pharmacy & approved early fill. Pharmacists states the patient reports taking this twice daily and wanted to know if PCP wanted to update the script to reflect that.

## 2023-07-25 ENCOUNTER — PATIENT MESSAGE (OUTPATIENT)
Dept: FAMILY MEDICINE CLINIC | Facility: CLINIC | Age: 63
End: 2023-07-25
Payer: MEDICAID

## 2023-07-31 ENCOUNTER — TELEPHONE (OUTPATIENT)
Dept: FAMILY MEDICINE CLINIC | Facility: CLINIC | Age: 63
End: 2023-07-31
Payer: MEDICAID

## 2023-07-31 ENCOUNTER — PATIENT MESSAGE (OUTPATIENT)
Dept: FAMILY MEDICINE CLINIC | Facility: CLINIC | Age: 63
End: 2023-07-31
Payer: MEDICAID

## 2023-08-01 ENCOUNTER — TELEPHONE (OUTPATIENT)
Dept: FAMILY MEDICINE CLINIC | Facility: CLINIC | Age: 63
End: 2023-08-01
Payer: MEDICAID

## 2023-08-01 DIAGNOSIS — F41.1 GENERALIZED ANXIETY DISORDER: Primary | ICD-10-CM

## 2023-08-01 RX ORDER — LORAZEPAM 0.5 MG/1
0.5 TABLET ORAL EVERY 8 HOURS PRN
Qty: 90 TABLET | Refills: 2 | Status: SHIPPED | OUTPATIENT
Start: 2023-08-01

## 2023-08-17 ENCOUNTER — TELEMEDICINE (OUTPATIENT)
Dept: PSYCHIATRY | Facility: CLINIC | Age: 63
End: 2023-08-17
Payer: MEDICAID

## 2023-08-17 DIAGNOSIS — F43.10 POST TRAUMATIC STRESS DISORDER (PTSD): ICD-10-CM

## 2023-08-17 DIAGNOSIS — F33.2 SEVERE EPISODE OF RECURRENT MAJOR DEPRESSIVE DISORDER, WITHOUT PSYCHOTIC FEATURES: ICD-10-CM

## 2023-08-17 DIAGNOSIS — F41.1 GENERALIZED ANXIETY DISORDER: Primary | ICD-10-CM
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
## 2023-09-06 ENCOUNTER — TELEMEDICINE (OUTPATIENT)
Dept: PSYCHIATRY | Facility: CLINIC | Age: 63
End: 2023-09-06
Payer: MEDICAID

## 2023-09-06 ENCOUNTER — TELEPHONE (OUTPATIENT)
Dept: PSYCHIATRY | Facility: CLINIC | Age: 63
End: 2023-09-06

## 2023-09-06 DIAGNOSIS — F33.2 SEVERE EPISODE OF RECURRENT MAJOR DEPRESSIVE DISORDER, WITHOUT PSYCHOTIC FEATURES: Primary | ICD-10-CM

## 2023-09-06 DIAGNOSIS — F41.1 GENERALIZED ANXIETY DISORDER: ICD-10-CM

## 2023-09-06 DIAGNOSIS — F43.10 POST TRAUMATIC STRESS DISORDER (PTSD): ICD-10-CM

## 2023-09-06 NOTE — TELEPHONE ENCOUNTER
Spoke to patients primary care about patient needing some technical assistance with his appointments with our office. Pts fuentes needs to be unmuted for future appointments, and he is having difficulties getting to the settings to unmute.     Linda at pcp office stated that he could come into the office for assistance.

## 2023-09-06 NOTE — PROGRESS NOTES
Date: September 6, 2023  Time In: 10:00AM  Time Out: 11:00AM  This provider is located at [place], [place] for [facility] ([facility]), [address], [place] [address] using a secure [employer] Video Visit through [employer]. Patient is being seen remotely via telehealth at home address in [place] and stated they are in a secure environment for this session. The patient's condition being diagnosed/treated is appropriate for telemedicine. The provider identified herself as well as her credentials. The patient, and/or patients guardian, consent to be seen remotely, and when consent is given they understand that the consent allows for patient identifiable information to be sent to a third party as needed. They may refuse to be seen remotely at any time. The electronic data is encrypted and password protected, and the patient and/or guardian has been advised of the potential risks to privacy not withstanding such measures.      You have chosen to receive care through a telehealth visit.  Do you consent to use a video/audio connection for your medical care today? Yes    PROGRESS NOTE  Data:  Félix Brian is a 63 y.o. male who presents today for follow up    Chief Complaint: Anxiety, PTSD, Depression    History of Present Illness: Patient reports fluctuating anxiety with panic attacks and depression through the week. Patient reports throat cancer in 2009 with difficulty swallowing which has returned to over the past couple months. Patient reports adverse effects of medications which increased anxiety at night. Patient reports noticeable increased agitation and irritability over the past couple of months. 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 Patient reports experiencing paranoia and excessive worrying about things outside of his control. Patient reports frustration and stress surrounding  medications and not getting the results desired. Patient reports unresolved loss and grief over multiple losses over the past few years. Patient was having difficulty with turning on his microphone during video visit which resulted in having to conduct session over video and over the telephone.       Clinical Maneuvering/Intervention: CBT, MI, Patient Centered    (Scales based on 0 - 10 with 10 being the worst)  Depression: 9 Anxiety: 10       Assisted patient in processing above session content; acknowledged and normalized patient’s thoughts, feelings, and concerns.  Rationalized patient thought process regarding recent stressors and life events. Discussed triggers associated with patient's emotions. Also discussed coping skills for patient to implement. Discussed excessive worrying and attempted to identify ways to utilize relaxation exercises. Explored circular breathing exercises and grounding techniques to utilize through the week.     Allowed patient to freely discuss issues without interruption or judgment. Provided safe, confidential environment to facilitate the development of positive therapeutic relationship and encourage open, honest communication. Assisted patient in identifying risk factors which would indicate the need for higher level of care including thoughts to harm self or others and/or self-harming behavior and encouraged patient to contact this office, call 911, or present to the nearest emergency room should any of these events occur. Discussed crisis intervention services and means to access. Patient adamantly and convincingly denies current suicidal or homicidal ideation or perceptual disturbance.    Assessment:   Assessment   Patient appears to maintain relative stability as compared to their baseline.  However, patient continues to struggle with anxiety, PTSD, and depression which continues to cause impairment in important areas of functioning.  A result, they can be reasonably expected to  continue to benefit from treatment and would likely be at increased risk for decompensation otherwise.    Mental Status Exam:   Hygiene:   good  Cooperation:  Cooperative  Eye Contact:  Good  Psychomotor Behavior:  Appropriate  Affect:  Full range  Mood: anxious and irritable  Speech:  Normal  Thought Process:  Linear  Thought Content:  Mood congruent  Suicidal:  None  Homicidal:  None  Hallucinations:  None  Delusion:  None  Memory:  Intact  Orientation:  Person, Place, Time and Situation  Reliability:  fair  Insight:  Fair  Judgement:  Fair  Impulse Control:  Fair  Physical/Medical Issues:  No        Patient's Support Network Includes:  extended family    Functional Status: Mild impairment     Progress toward goal: Patient is working towards processing emotions and thoughts during sessions and self care practices.     Prognosis: Good with Ongoing Treatment            Plan:    Patient will continue in individual outpatient therapy with focus on improved functioning and coping skills, maintaining stability, and avoiding decompensation and the need for higher level of care.    Patient will adhere to medication regimen as prescribed and report any side effects. Patient will contact this office, call 911 or present to the nearest emergency room should suicidal or homicidal ideations occur. Provide Cognitive Behavioral Therapy and Solution Focused Therapy to improve functioning, maintain stability, and avoid decompensation and the need for higher level of care.     Return in about 2 weeks, or earlier if symptoms worsen or fail to improve.           VISIT DIAGNOSIS:     ICD-10-CM ICD-9-CM   1. Severe episode of recurrent major depressive disorder, without psychotic features  F33.2 296.33   2. Generalized anxiety disorder  F41.1 300.02   3. Post traumatic stress disorder (PTSD)  F43.10 309.81        Diagnoses and all orders for this visit:    1. Severe episode of recurrent major depressive disorder, without psychotic  features (Primary)    2. Generalized anxiety disorder    3. Post traumatic stress disorder (PTSD)           [facility] No Show Policy:  We understand unexpected circumstances arise; however, anytime you miss your appointment we are unable to provide you appropriate care.  In addition, each appointment missed could have been used to provide care for others.  We ask that you call at least 24 hours in advance to cancel or reschedule an appointment.  We would like to take this opportunity to remind you of our policy stating patients who miss THREE or more appointments without cancelling or rescheduling 24 hours in advance of the appointment may be subject to cancellation of any further visits with our clinic and recommendation to seek in-person services/visits.    Please call [phone] to reschedule your appointment. If there are reasons that make it difficult for you to keep the appointments, please call and let us know how we can help.  Please understand that medication prescribing will not continue without seeing your provider.      [facility]'s No Show Policy reviewed with patient at today's visit. Patient verbalized understanding of this policy. Discussed with patient that in the event that there are three or more no show visits, it will be recommended that they pursue in-person services/visits as noncompliance with telehealth visits indicates that patient is not an appropriate candidate for telemedicine and would likely be more appropriate for in-person services/visits. Patient verbalizes understanding and is agreeable to this.       This document has been electronically signed by Fadi Mcintyre III, LCSW  September 6, 2023 10:32 EDT      Part of this note may be an electronic transcription/translation of spoken language to printed text using the Dragon Dictation System.

## 2023-09-11 ENCOUNTER — TELEPHONE (OUTPATIENT)
Dept: PSYCHIATRY | Facility: CLINIC | Age: 63
End: 2023-09-11
Payer: MEDICAID

## 2023-09-11 NOTE — TELEPHONE ENCOUNTER
Attempted to reach pt regarding cancelled appt scheduled on 9/12/23. After chart reviewed by provider, it is recommended that pt be seen in person by a psychiatrist. Pt acuity is outside of the scope of midlevel providers.

## 2023-09-18 ENCOUNTER — TELEMEDICINE (OUTPATIENT)
Dept: PSYCHIATRY | Facility: CLINIC | Age: 63
End: 2023-09-18
Payer: MEDICAID

## 2023-09-18 DIAGNOSIS — F33.2 SEVERE EPISODE OF RECURRENT MAJOR DEPRESSIVE DISORDER, WITHOUT PSYCHOTIC FEATURES: Primary | ICD-10-CM

## 2023-09-18 DIAGNOSIS — F43.10 POST TRAUMATIC STRESS DISORDER (PTSD): ICD-10-CM

## 2023-09-18 DIAGNOSIS — F41.1 GENERALIZED ANXIETY DISORDER: ICD-10-CM

## 2023-09-18 NOTE — PROGRESS NOTES
Date: September 18, 2023  Time In: 1:30PM  Time Out: 2:30PM  This provider is located at [place], [place] for [facility] (through [facility]), [address], [place] [address] using a secure [employer] Video Visit through [employer]. Patient is being seen remotely via telehealth at home address in [place] and stated they are in a secure environment for this session. The patient's condition being diagnosed/treated is appropriate for telemedicine. The provider identified herself as well as her credentials. The patient, and/or patients guardian, consent to be seen remotely, and when consent is given they understand that the consent allows for patient identifiable information to be sent to a third party as needed. They may refuse to be seen remotely at any time. The electronic data is encrypted and password protected, and the patient and/or guardian has been advised of the potential risks to privacy not withstanding such measures.     You have chosen to receive care through a telehealth visit.  Do you consent to use a video/audio connection for your medical care today? Yes    PROGRESS NOTE  Data:  Félix Brian is a 63 y.o. male who presents today for follow up    Chief Complaint: Depression, PTSD, Anxiety    History of Present Illness: Patient reports fluctuating anxiety and depression through the week. Patient reports experiencing difficulties regulating emotions and managing impulsive behavior since accident. Patient reports fear and stress surrounding financial responsibilities while being on disability. Patient reports anxiety induced panic attacks when going out in social environments or enraging in simple tasks including shopping. Patient reports continued listlessness and not being able to get off the couch some day through the week. Patient reports trying to contact some friends and sister when experiencing symptoms.      Clinical Maneuvering/Intervention:  CBT, MI, Patient Centered    (Scales based on 0 - 10 with 10 being the worst)  Depression: 7 Anxiety: 10       Assisted patient in processing above session content; acknowledged and normalized patient’s thoughts, feelings, and concerns.  Rationalized patient thought process regarding recent stressors and life events. Discussed triggers associated with patient's emotions. Also discussed coping skills for patient to implement. Discussed difficulty regulating emotions and difficulty relaxing. Processed thoughts and emotions surrounding difficulties managing daily tasks. Discussed talking with psychiatrist about medications, staying busy, and practicing increased self care through the week    Allowed patient to freely discuss issues without interruption or judgment. Provided safe, confidential environment to facilitate the development of positive therapeutic relationship and encourage open, honest communication. Assisted patient in identifying risk factors which would indicate the need for higher level of care including thoughts to harm self or others and/or self-harming behavior and encouraged patient to contact this office, call 911, or present to the nearest emergency room should any of these events occur. Discussed crisis intervention services and means to access. Patient adamantly and convincingly denies current suicidal or homicidal ideation or perceptual disturbance.    Assessment:   Assessment   Patient appears to maintain relative stability as compared to their baseline.  However, patient continues to struggle with depression, PTSD, and anxiety which continues to cause impairment in important areas of functioning.  A result, they can be reasonably expected to continue to benefit from treatment and would likely be at increased risk for decompensation otherwise.    Mental Status Exam:   Hygiene:   good  Cooperation:  Cooperative  Eye Contact:  Good  Psychomotor Behavior:  Appropriate  Affect:  Full range  Mood:  depressed, anxious, and fluctates  Speech:  Normal  Thought Process:  Linear  Thought Content:  Mood congruent  Suicidal:  None  Homicidal:  None  Hallucinations:  None  Delusion:  None  Memory:  Intact  Orientation:  Person, Place, Time and Situation  Reliability:  fair  Insight:  Fair  Judgement:  Fair  Impulse Control:  Fair  Physical/Medical Issues:  No        Patient's Support Network Includes:   Sister    Functional Status: Moderate impairment     Progress toward goal: Patient is working towards practicing the ABC's of CBT model while at home to process through and correct irrational cognitions. Patient is making progress on daily positive affirmation and self care practices.     Prognosis: Fair with Ongoing Treatment            Plan:    Patient will continue in individual outpatient therapy with focus on improved functioning and coping skills, maintaining stability, and avoiding decompensation and the need for higher level of care.    Patient will adhere to medication regimen as prescribed and report any side effects. Patient will contact this office, call 911 or present to the nearest emergency room should suicidal or homicidal ideations occur. Provide Cognitive Behavioral Therapy and Solution Focused Therapy to improve functioning, maintain stability, and avoid decompensation and the need for higher level of care.     Return in about 3 weeks, or earlier if symptoms worsen or fail to improve.           VISIT DIAGNOSIS:     ICD-10-CM ICD-9-CM   1. Severe episode of recurrent major depressive disorder, without psychotic features  F33.2 296.33   2. Generalized anxiety disorder  F41.1 300.02   3. Post traumatic stress disorder (PTSD)  F43.10 309.81        Diagnoses and all orders for this visit:    1. Severe episode of recurrent major depressive disorder, without psychotic features (Primary)    2. Generalized anxiety disorder    3. Post traumatic stress disorder (PTSD)           [facility]  Clinic No Show Policy:  We understand unexpected circumstances arise; however, anytime you miss your appointment we are unable to provide you appropriate care.  In addition, each appointment missed could have been used to provide care for others.  We ask that you call at least 24 hours in advance to cancel or reschedule an appointment.  We would like to take this opportunity to remind you of our policy stating patients who miss THREE or more appointments without cancelling or rescheduling 24 hours in advance of the appointment may be subject to cancellation of any further visits with our clinic and recommendation to seek in-person services/visits.    Please call [phone] to reschedule your appointment. If there are reasons that make it difficult for you to keep the appointments, please call and let us know how we can help.  Please understand that medication prescribing will not continue without seeing your provider.      [facility]'s No Show Policy reviewed with patient at today's visit. Patient verbalized understanding of this policy. Discussed with patient that in the event that there are three or more no show visits, it will be recommended that they pursue in-person services/visits as noncompliance with telehealth visits indicates that patient is not an appropriate candidate for telemedicine and would likely be more appropriate for in-person services/visits. Patient verbalizes understanding and is agreeable to this.       This document has been electronically signed by Fadi Mcintyre III, LCSW  September 18, 2023 14:33 EDT      Part of this note may be an electronic transcription/translation of spoken language to printed text using the Dragon Dictation System.

## 2023-09-19 NOTE — OUTREACH NOTE
Call Center TCM Note      Responses   [facility] patient discharged from?  [facility]   Does the patient have one of the following disease processes/diagnoses(primary or secondary)?  Other   TCM attempt successful?  Yes   Call start time  0911   Call end time  0915   Discharge diagnosis  Chest pain-left heart cath this visit   Meds reviewed with patient/caregiver?  Yes   Is the patient having any side effects they believe may be caused by any medication additions or changes?  No   Does the patient have all medications ordered at discharge?  Yes   Is the patient taking all medications as directed (includes completed medication regime)?  Yes   Does the patient have a primary care provider?   Yes   Does the patient have an appointment with their PCP within 7 days of discharge?  Yes   Comments regarding PCP  hospital f/u with Dr. Bolaños on 8/30   Has the patient kept scheduled appointments due by today?  N/A   Has home health visited the patient within 72 hours of discharge?  N/A   Psychosocial issues?  No   Did the patient receive a copy of their discharge instructions?  Yes   Nursing interventions  Reviewed instructions with patient   What is the patient's perception of their health status since discharge?  Improving   Is the patient/caregiver able to teach back the hierarchy of who to call/visit for symptoms/problems? PCP, Specialist, Home health nurse, Urgent Care, ED, 911  Yes   TCM call completed?  Yes   Wrap up additional comments  Says he is doing well, no questions at this time, confirmed f/u appt with Dr. Bolaños for 8/30.          Carolyn Estrada RN    8/26/2021, 09:15 EDT      
Assistance OOB with selected safe patient handling equipment/Communicate Risk of Fall with Harm to all staff/Discuss with provider need for PT consult/Monitor gait and stability/Provide patient with walking aids - walker, cane, crutches/Reinforce activity limits and safety measures with patient and family/Tailored Fall Risk Interventions/Visual Cue: Yellow wristband and red socks/Bed in lowest position, wheels locked, appropriate side rails in place/Call bell, personal items and telephone in reach/Instruct patient to call for assistance before getting out of bed or chair/Non-slip footwear when patient is out of bed/[place] to call system/Physically safe environment - no spills, clutter or unnecessary equipment/Purposeful Proactive Rounding/Room/bathroom lighting operational, light cord in reach

## 2023-10-12 DIAGNOSIS — F41.1 GENERALIZED ANXIETY DISORDER: ICD-10-CM

## 2023-10-13 DIAGNOSIS — F41.1 GENERALIZED ANXIETY DISORDER: ICD-10-CM

## 2023-10-13 RX ORDER — LORAZEPAM 0.5 MG/1
0.5 TABLET ORAL EVERY 8 HOURS PRN
Qty: 90 TABLET | Refills: 2 | OUTPATIENT
Start: 2023-10-13

## 2023-10-13 NOTE — TELEPHONE ENCOUNTER
Rx Refill Note  Requested Prescriptions     Pending Prescriptions Disp Refills    LORazepam (Ativan) 0.5 MG tablet 90 tablet 2     Sig: Take 1 tablet by mouth Every 8 (Eight) Hours As Needed for Anxiety.      Last office visit with prescribing clinician: 7/12/2023   Last telemedicine visit with prescribing clinician: Visit date not found   Next office visit with prescribing clinician: 10/18/2023                         Would you like a call back once the refill request has been completed: [] Yes [] No    If the office needs to give you a call back, can they leave a voicemail: [] Yes [] No    Nathalie Soto MA  10/13/23, 12:18 EDT

## 2023-10-14 RX ORDER — LORAZEPAM 0.5 MG/1
0.5 TABLET ORAL EVERY 8 HOURS PRN
Qty: 90 TABLET | Refills: 2 | Status: SHIPPED | OUTPATIENT
Start: 2023-10-14

## 2023-10-18 ENCOUNTER — OFFICE VISIT (OUTPATIENT)
Dept: FAMILY MEDICINE CLINIC | Facility: CLINIC | Age: 63
End: 2023-10-18
Payer: MEDICAID

## 2023-10-18 VITALS
HEART RATE: 68 BPM | WEIGHT: 221 LBS | DIASTOLIC BLOOD PRESSURE: 82 MMHG | HEIGHT: 73 IN | SYSTOLIC BLOOD PRESSURE: 122 MMHG | BODY MASS INDEX: 29.29 KG/M2 | OXYGEN SATURATION: 98 %

## 2023-10-18 DIAGNOSIS — F41.1 GENERALIZED ANXIETY DISORDER: ICD-10-CM

## 2023-10-18 DIAGNOSIS — E55.9 VITAMIN D DEFICIENCY: ICD-10-CM

## 2023-10-18 DIAGNOSIS — Z12.11 COLON CANCER SCREENING: ICD-10-CM

## 2023-10-18 DIAGNOSIS — Z00.00 PREVENTATIVE HEALTH CARE: Primary | ICD-10-CM

## 2023-10-18 DIAGNOSIS — Z12.5 ENCOUNTER FOR PROSTATE CANCER SCREENING: ICD-10-CM

## 2023-10-18 DIAGNOSIS — F33.9 EPISODE OF RECURRENT MAJOR DEPRESSIVE DISORDER, UNSPECIFIED DEPRESSION EPISODE SEVERITY: ICD-10-CM

## 2023-10-18 PROCEDURE — 1159F MED LIST DOCD IN RCRD: CPT | Performed by: INTERNAL MEDICINE

## 2023-10-18 PROCEDURE — 3074F SYST BP LT 130 MM HG: CPT | Performed by: INTERNAL MEDICINE

## 2023-10-18 PROCEDURE — 3044F HG A1C LEVEL LT 7.0%: CPT | Performed by: INTERNAL MEDICINE

## 2023-10-18 PROCEDURE — 3079F DIAST BP 80-89 MM HG: CPT | Performed by: INTERNAL MEDICINE

## 2023-10-18 PROCEDURE — 99396 PREV VISIT EST AGE 40-64: CPT | Performed by: INTERNAL MEDICINE

## 2023-10-18 PROCEDURE — 1160F RVW MEDS BY RX/DR IN RCRD: CPT | Performed by: INTERNAL MEDICINE

## 2023-10-18 NOTE — PROGRESS NOTES
Chief Complaint   Patient presents with    Anxiety    Depression       HPI:  Félix Brian is a 63 y.o. male who presents today for annual exam.    ROS:  Constitutional: no fevers, night sweats or unexplained weight loss  Eyes: no vision changes  ENT: no runny nose, ear pain, sore throat  Cardio: no chest pain, palpitations  Pulm: no shortness of breath, wheezing, or cough  GI: no abdominal pain or changes in bowel movements  : no difficulty urinating  MSK: no difficulty ambulating, no joint pain  Neuro: no weakness, dizziness or headache  Psych: no trouble sleeping  Endo: no change in appetite      Past Medical History:   Diagnosis Date    Arthritis     Boston esophagus     Electrocution 02/2021    GERD (gastroesophageal reflux disease)     Hypertension     Memory loss       Family History   Problem Relation Age of Onset    Cancer Mother     No Known Problems Father     Diabetes Brother     Cancer Maternal Grandmother     Cancer Maternal Grandfather     Cancer Paternal Grandmother     Cancer Paternal Grandfather       Social History     Socioeconomic History    Marital status:    Tobacco Use    Smoking status: Never    Smokeless tobacco: Never   Vaping Use    Vaping Use: Never used   Substance and Sexual Activity    Alcohol use: Yes     Comment: 3 x wk     Drug use: Yes     Types: Marijuana    Sexual activity: Defer      Allergies   Allergen Reactions    Ativan [Lorazepam] Mental Status Change     Headaches, confusion    Buspar [Buspirone] Mental Status Change        There is no immunization history on file for this patient.     PE:  Vitals:    10/18/23 1059   BP: 122/82   Pulse: 68   SpO2: 98%      Body mass index is 29.16 kg/m².    Gen Appearance: NAD  HEENT: Normocephalic, PERRLA, no thyromegaly, trache midline  Heart: RRR, normal S1 and S2, no murmur  Lungs: CTA b/l, no wheezing, no crackles  Abdomen: Soft, non-tender, non-distended, no guarding and BSx4  MSK: Moves all extremities well,  normal gait, no peripheral edema  Pulses: Palpable and equal b/l  Lymph nodes: No palpable lymphadenopathy   Neuro: No focal deficits      Current Outpatient Medications   Medication Sig Dispense Refill    hydrOXYzine (ATARAX) 50 MG tablet TAKE 1 TABLET BY MOUTH AT NIGHT AS NEEDED FOR ANXIETY 30 tablet 2    hydrOXYzine (ATARAX) 50 MG tablet Take 1 tablet by mouth At Night As Needed for Anxiety. 30 tablet 2    LORazepam (Ativan) 0.5 MG tablet Take 1 tablet by mouth Every 8 (Eight) Hours As Needed for Anxiety. 90 tablet 2    OMEPRAZOLE PO Take  by mouth.       No current facility-administered medications for this visit.        Diagnoses and all orders for this visit:    1. Preventative health care (Primary)  -     CBC & Differential; Future  -     Comprehensive Metabolic Panel; Future  -     Hemoglobin A1c; Future  -     Lipid Panel; Future  -     TSH+Free T4; Future  -     Urinalysis With Culture If Indicated -; Future  Counseled on healthy weight, nutrition, physical activity, cancer screening, and immunizations.    2. Encounter for prostate cancer screening    3. Episode of recurrent major depressive disorder, unspecified depression episode severity    4. Generalized anxiety disorder  Currently taking Ativan 3 times daily as needed anxiety and panic attacks.  Has tried and failed several serotonin anxiety medications in the past.  5. Vitamin D deficiency  -     Vitamin D,25-Hydroxy; Future    6. Colon cancer screening  -     Cologuard - Stool, Per Rectum; Future         Return in about 3 months (around 1/18/2024).     Dictated Utilizing Dragon Dictation    Please note that portions of this note were completed with a voice recognition program.    Part of this note may be an electronic transcription/translation of spoken language to printed text using the Dragon Dictation System.

## 2023-10-30 ENCOUNTER — TELEMEDICINE (OUTPATIENT)
Dept: PSYCHIATRY | Facility: CLINIC | Age: 63
End: 2023-10-30
Payer: MEDICAID

## 2023-10-30 DIAGNOSIS — F33.2 SEVERE EPISODE OF RECURRENT MAJOR DEPRESSIVE DISORDER, WITHOUT PSYCHOTIC FEATURES: Primary | ICD-10-CM

## 2023-10-30 DIAGNOSIS — F41.1 GENERALIZED ANXIETY DISORDER: ICD-10-CM

## 2023-10-30 DIAGNOSIS — F43.10 POST TRAUMATIC STRESS DISORDER (PTSD): ICD-10-CM

## 2023-10-30 PROCEDURE — 90837 PSYTX W PT 60 MINUTES: CPT | Performed by: COUNSELOR

## 2023-10-30 NOTE — PROGRESS NOTES
Date: October 30, 2023  Time In: 11:00AM  Time Out: 12:00PM  This provider is located at [place], [place] for [facility] (through [facility]), [address], [place] [address] using a secure [employer] Video Visit through [employer]. Patient is being seen remotely via telehealth at home address in [place] and stated they are in a secure environment for this session. The patient's condition being diagnosed/treated is appropriate for telemedicine. The provider identified herself as well as her credentials. The patient, and/or patients guardian, consent to be seen remotely, and when consent is given they understand that the consent allows for patient identifiable information to be sent to a third party as needed. They may refuse to be seen remotely at any time. The electronic data is encrypted and password protected, and the patient and/or guardian has been advised of the potential risks to privacy not withstanding such measures.     You have chosen to receive care through a telehealth visit.  Do you consent to use a video/audio connection for your medical care today? Yes    PROGRESS NOTE  Data:  Félix Brian is a 63 y.o. male who presents today for follow up    Chief Complaint: Depression and Anxiety    History of Present Illness: Patient reports fluctuating anxiety and depression through the week. Patient reports going to see psychiatrist surrounding panic attacks and possible medications to treat symptoms. Patient reports medications made him feel intense anxiety and he experiencing panic attack that lasted several hours. Patient reports struggling with excessive worrying and regulating emotions through the week. Patient reports medications feel like they are not working any more and feeling extremely frustrated with current state. Patient reports family and financial stress with limited support since his wife passed away. Patient [place]  [place] and feeling increased hypervigilance due to feeling needing to protect the property. Patient reports experiencing connection with God recently that has improved his worry about being alone. Patient reports working towards utilizing positive thinking, maintaining all medical appointments, and [place] farm.       Clinical Maneuvering/Intervention: CBT, MI, Patient Centered    (Scales based on 0 - 10 with 10 being the worst)  Depression: 8 Anxiety: 9       Assisted patient in processing above session content; acknowledged and normalized patient’s thoughts, feelings, and concerns.  Rationalized patient thought process regarding recent stressors and life events. Discussed triggers associated with patient's emotions. Utilized CBT to process through and correct irrational cognitions with emphasis on  medications and assistance from others . Also discussed coping skills for patient to implement. Discussed wide range of emotions and difficulty regulating them daily. Processed through thoughts and emotions surrounding frustration over medications and side effects as well as limited results addressing panic attacks. Identified thoughts and beliefs surrounding minimal results and the thoughts experienced as a result. Explored alternative thoughts to apply when frustrated and the possible emotions that result. Discussed continued work towards short term goals of staying busy, maintaining all medical appointments, and increasing self care.     Allowed patient to freely discuss issues without interruption or judgment. Provided safe, confidential environment to facilitate the development of positive therapeutic relationship and encourage open, honest communication. Assisted patient in identifying risk factors which would indicate the need for higher level of care including thoughts to harm self or others and/or self-harming behavior and encouraged patient to contact this office,  call 911, or present to the nearest emergency room should any of these events occur. Discussed crisis intervention services and means to access. Patient adamantly and convincingly denies current suicidal or homicidal ideation or perceptual disturbance.    Assessment:   Assessment   Patient appears to maintain relative stability as compared to their baseline.  However, patient continues to struggle with depression and anxiety which continues to cause impairment in important areas of functioning.  A result, they can be reasonably expected to continue to benefit from treatment and would likely be at increased risk for decompensation otherwise.    Mental Status Exam:   Hygiene:   good  Cooperation:  Cooperative  Eye Contact:  Good  Psychomotor Behavior:  Appropriate  Affect:  Full range  Mood: fluctates  Speech:  Normal  Thought Process:  Linear  Thought Content:  Mood congruent  Suicidal:  None  Homicidal:  None  Hallucinations:  None  Delusion:  None  Memory:  Intact  Orientation:  Person, Place, Time and Situation  Reliability:  fair  Insight:  Fair  Judgement:  Fair  Impulse Control:  Fair  Physical/Medical Issues:  No        Patient's Support Network Includes:  extended family    Functional Status: Mild impairment     Progress toward goal: Patient is working towards practicing the ABC's of CBT model while at home to process through and correct or improve cognitions. Patient is making progress on processing thoughts and emotions during sessions, utilizing positive thinking strategies, daily positive affirmations and self care practices.     Prognosis: Good with Ongoing Treatment            Plan:    Patient will continue in individual outpatient therapy with focus on improved functioning and coping skills, maintaining stability, and avoiding decompensation and the need for higher level of care.    Patient will adhere to medication regimen as prescribed and report any side effects. Patient will contact this office,  call 911 or present to the nearest emergency room should suicidal or homicidal ideations occur. Provide Cognitive Behavioral Therapy and Solution Focused Therapy to improve functioning, maintain stability, and avoid decompensation and the need for higher level of care.     Return in about 3 weeks, or earlier if symptoms worsen or fail to improve.           VISIT DIAGNOSIS:     ICD-10-CM ICD-9-CM   1. Severe episode of recurrent major depressive disorder, without psychotic features  F33.2 296.33   2. Generalized anxiety disorder  F41.1 300.02   3. Post traumatic stress disorder (PTSD)  F43.10 309.81        Diagnoses and all orders for this visit:    1. Severe episode of recurrent major depressive disorder, without psychotic features (Primary)    2. Generalized anxiety disorder    3. Post traumatic stress disorder (PTSD)           [facility] No Show Policy:  We understand unexpected circumstances arise; however, anytime you miss your appointment we are unable to provide you appropriate care.  In addition, each appointment missed could have been used to provide care for others.  We ask that you call at least 24 hours in advance to cancel or reschedule an appointment.  We would like to take this opportunity to remind you of our policy stating patients who miss THREE or more appointments without cancelling or rescheduling 24 hours in advance of the appointment may be subject to cancellation of any further visits with our clinic and recommendation to seek in-person services/visits.    Please call [phone] to reschedule your appointment. If there are reasons that make it difficult for you to keep the appointments, please call and let us know how we can help.  Please understand that medication prescribing will not continue without seeing your provider.      [facility]'s No Show Policy reviewed with patient at today's visit. Patient verbalized understanding of this policy.  Discussed with patient that in the event that there are three or more no show visits, it will be recommended that they pursue in-person services/visits as noncompliance with telehealth visits indicates that patient is not an appropriate candidate for telemedicine and would likely be more appropriate for in-person services/visits. Patient verbalizes understanding and is agreeable to this.       This document has been electronically signed by Fadi Mcintyre III, LCSW  October 30, 2023 11:32 EDT      Part of this note may be an electronic transcription/translation of spoken language to printed text using the Dragon Dictation System.

## 2023-11-03 DIAGNOSIS — F32.A ANXIETY AND DEPRESSION: Primary | ICD-10-CM

## 2023-11-03 DIAGNOSIS — F41.9 ANXIETY AND DEPRESSION: Primary | ICD-10-CM

## 2023-11-10 DIAGNOSIS — R41.9 NEUROCOGNITIVE DISORDER: Primary | ICD-10-CM

## 2023-11-10 DIAGNOSIS — R41.3 MEMORY LOSS: ICD-10-CM

## 2023-11-21 ENCOUNTER — TELEMEDICINE (OUTPATIENT)
Dept: PSYCHIATRY | Facility: CLINIC | Age: 63
End: 2023-11-21
Payer: MEDICAID

## 2023-11-21 DIAGNOSIS — F33.2 SEVERE EPISODE OF RECURRENT MAJOR DEPRESSIVE DISORDER, WITHOUT PSYCHOTIC FEATURES: Primary | ICD-10-CM

## 2023-11-21 DIAGNOSIS — F43.10 POST TRAUMATIC STRESS DISORDER (PTSD): ICD-10-CM

## 2023-11-21 DIAGNOSIS — F41.1 GENERALIZED ANXIETY DISORDER: ICD-10-CM

## 2023-11-21 NOTE — PROGRESS NOTES
Date: November 21, 2023  Time In: 12:30PM  Time Out: 1:30PM  This provider is located at [place], [place] for [facility] (through [facility]), [address], [place] [address] using a secure [employer] Video Visit through [employer]. Patient is being seen remotely via telehealth at home address in [place] and stated they are in a secure environment for this session. The patient's condition being diagnosed/treated is appropriate for telemedicine. The provider identified herself as well as her credentials. The patient, and/or patients guardian, consent to be seen remotely, and when consent is given they understand that the consent allows for patient identifiable information to be sent to a third party as needed. They may refuse to be seen remotely at any time. The electronic data is encrypted and password protected, and the patient and/or guardian has been advised of the potential risks to privacy not withstanding such measures.     You have chosen to receive care through a telehealth visit.  Do you consent to use a video/audio connection for your medical care today? Yes    PROGRESS NOTE  Data:  Félix Brian is a 63 y.o. male who presents today for follow up    Chief Complaint: Depression and Anxiety    History of Present Illness: Patient reports increased anxiety and depression through the week. Patient reports [place] two horses [place] farm recently which was almost unbearable. Patient reports feeling symptoms of a heart attack including numb arm and trouble breathing before going to hospital. Patient reports that the local hospital was unable to help and referred him to [facility]. Patient reports he was unable to get medication filled and struggled following the ER visit. Patient reports that his sister is not doing well medically which is making the holidays even harder for him. Patient reports difficulty regulating emotions and feeling lonely during this time  of year. Patient reports re-occurring negative thinking pattens and negative self perception. Patient identified need to engage in more social activities and self care through the upcoming holidays.        Clinical Maneuvering/Intervention: CBT, MI, Patient Centered    (Scales based on 0 - 10 with 10 being the worst)  Depression: 8 Anxiety: 8       Assisted patient in processing above session content; acknowledged and normalized patient’s thoughts, feelings, and concerns.  Rationalized patient thought process regarding recent stressors and life events. Discussed triggers associated with patient's emotions. Utilized CBT to process through and correct irrational cognitions with emphasis on  reversing established negative thinking strategies  . Also discussed coping skills for patient to implement. 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  Processed through thoughts and emotions surrounding recent trip to the hospital, feeling distant from family, and difficulty finding peace.  Discussed continued work towards setting personal boundaries, making time for relaxation, and increasing self-care practices through the week.    Allowed patient to freely discuss issues without interruption or judgment. Provided safe, confidential environment to facilitate the development of positive therapeutic relationship and encourage open, honest communication. Assisted patient in identifying risk factors which would indicate the need for higher level of care including thoughts to harm self or others and/or self-harming behavior and encouraged patient to contact this office, call 911, or present to the nearest emergency room should any of these events occur. Discussed crisis intervention services and means to access. Patient adamantly and convincingly denies current suicidal or homicidal ideation or perceptual disturbance.    Assessment:   Assessment   Patient appears to maintain  relative stability as compared to their baseline.  However, patient continues to struggle with depression and anxiety which continues to cause impairment in important areas of functioning.  A result, they can be reasonably expected to continue to benefit from treatment and would likely be at increased risk for decompensation otherwise.    Mental Status Exam:   Hygiene:   good  Cooperation:  Cooperative  Eye Contact:  Good  Psychomotor Behavior:  Appropriate  Affect:  Full range  Mood: fluctates  Speech:  Normal  Thought Process:  Linear  Thought Content:  Mood congruent  Suicidal:  None  Homicidal:  None  Hallucinations:  None  Delusion:  None  Memory:  Intact  Orientation:  Person, Place, Time and Situation  Reliability:  fair  Insight:  Fair  Judgement:  Fair  Impulse Control:  Fair  Physical/Medical Issues:  No        Patient's Support Network Includes:  extended family    Functional Status: Mild impairment     Progress toward goal: Patient is working towards practicing the ABC's of CBT model while at home to process through and correct or improve cognitions. Patient is making progress on processing thoughts and emotions during sessions, utilizing positive thinking strategies, daily positive affirmations and self care practices.     Prognosis: Good with Ongoing Treatment            Plan:    Patient will continue in individual outpatient therapy with focus on improved functioning and coping skills, maintaining stability, and avoiding decompensation and the need for higher level of care.    Patient will adhere to medication regimen as prescribed and report any side effects. Patient will contact this office, call 911 or present to the nearest emergency room should suicidal or homicidal ideations occur. Provide Cognitive Behavioral Therapy and Solution Focused Therapy to improve functioning, maintain stability, and avoid decompensation and the need for higher level of care.     Return in about 3 weeks, or earlier if  symptoms worsen or fail to improve.           VISIT DIAGNOSIS:     ICD-10-CM ICD-9-CM   1. Severe episode of recurrent major depressive disorder, without psychotic features  F33.2 296.33   2. Generalized anxiety disorder  F41.1 300.02   3. Post traumatic stress disorder (PTSD)  F43.10 309.81        Diagnoses and all orders for this visit:    1. Severe episode of recurrent major depressive disorder, without psychotic features (Primary)    2. Generalized anxiety disorder    3. Post traumatic stress disorder (PTSD)           [facility] No Show Policy:  We understand unexpected circumstances arise; however, anytime you miss your appointment we are unable to provide you appropriate care.  In addition, each appointment missed could have been used to provide care for others.  We ask that you call at least 24 hours in advance to cancel or reschedule an appointment.  We would like to take this opportunity to remind you of our policy stating patients who miss THREE or more appointments without cancelling or rescheduling 24 hours in advance of the appointment may be subject to cancellation of any further visits with our clinic and recommendation to seek in-person services/visits.    Please call [phone] to reschedule your appointment. If there are reasons that make it difficult for you to keep the appointments, please call and let us know how we can help.  Please understand that medication prescribing will not continue without seeing your provider.      [facility]'s No Show Policy reviewed with patient at today's visit. Patient verbalized understanding of this policy. Discussed with patient that in the event that there are three or more no show visits, it will be recommended that they pursue in-person services/visits as noncompliance with telehealth visits indicates that patient is not an appropriate candidate for telemedicine and would likely be more appropriate for  in-person services/visits. Patient verbalizes understanding and is agreeable to this.       This document has been electronically signed by Fadi Mcintyre III, REYNA  November 21, 2023 16:52 EST      Part of this note may be an electronic transcription/translation of spoken language to printed text using the Dragon Dictation System.

## 2023-12-04 ENCOUNTER — HOSPITAL ENCOUNTER (EMERGENCY)
Facility: HOSPITAL | Age: 63
Discharge: HOME | End: 2023-12-04
Attending: EMERGENCY MEDICINE | Admitting: EMERGENCY MEDICINE
Payer: MEDICAID

## 2023-12-04 VITALS
RESPIRATION RATE: 18 BRPM | OXYGEN SATURATION: 96 % | HEART RATE: 79 BPM | SYSTOLIC BLOOD PRESSURE: 152 MMHG | DIASTOLIC BLOOD PRESSURE: 94 MMHG | TEMPERATURE: 97.5 F

## 2023-12-04 DIAGNOSIS — F41.0 PANIC ATTACKS: ICD-10-CM

## 2023-12-04 DIAGNOSIS — F41.9 ANXIETY: Primary | ICD-10-CM

## 2023-12-04 PROCEDURE — 99283 EMERGENCY DEPT VISIT LOW MDM: CPT

## 2023-12-04 RX ORDER — CLONAZEPAM 1 MG/1
1 TABLET ORAL ONCE
Status: COMPLETED | OUTPATIENT
Start: 2023-12-04 | End: 2023-12-04

## 2023-12-04 RX ADMIN — CLONAZEPAM 1 MG: 1 TABLET ORAL at 19:46

## 2023-12-05 ENCOUNTER — TELEPHONE (OUTPATIENT)
Dept: FAMILY MEDICINE CLINIC | Facility: CLINIC | Age: 63
End: 2023-12-05

## 2023-12-05 NOTE — DISCHARGE INSTRUCTIONS
Symptomatic care is recommended. Take all medications as prescribed and instructed. Follow up with your primary care, psychiatrist as scheduled and behavioral health as scheduled or return to Emergency Department with worsening of symptoms.

## 2023-12-05 NOTE — ED PROVIDER NOTES
EMERGENCY DEPARTMENT ENCOUNTER    Pt Name: Félix Brian  MRN: [id number]  Pt :   1960  Room Number:  RW1/R1  Date of encounter:  2023  PCP: Nik Bolaños DO  ED Provider: TORITO Villanueva    Historian: Patient    HPI:  Chief Complaint: Anxiety and panic attacks    Context: Félix Brian is a 63 y.o. male who presents to the ED c/o anxiety and panic attacks.  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  He reports that since this time he has bad anxiety and panic attacks.  He is also experienced memory loss.  He states that he has been following with his primary care who has prescribed him medications to help but he reports that these are not working.  He states that he has been inpatient before but that the providers at the facility were crazy and prescribed him medications that did not match what his diagnosis is were or his DNA profile.  Patient denies any thoughts of hurting himself or anybody else.  He is looking for a different provider to help him with his anxiety and panic attacks and has been referred from his primary to an outpatient provider but cannot see them until the middle of January.  Patient states that today he has had 3 doses of his Ativan but it has not helped with his symptoms.  HPI     REVIEW OF SYSTEMS  A chief complaint appropriate review of systems was completed and is negative except as noted in the HPI.     PAST MEDICAL HISTORY  Past Medical History:   Diagnosis Date    Arthritis     Boston esophagus     Electrocution 2021    GERD (gastroesophageal reflux disease)     Hypertension     Memory loss        PAST SURGICAL HISTORY  Past Surgical History:   Procedure Laterality Date    CARDIAC CATHETERIZATION N/A 2021    Procedure: Left Heart Cath;  Surgeon: Martínez Estrada MD;  Location: [facility] CATH INVASIVE LOCATION;  Service: Cardiology;  Laterality: N/A;    ENDOSCOPY          FAMILY HISTORY  Family History   Problem Relation Age of Onset    Cancer Mother     No Known Problems Father     Diabetes Brother     Cancer Maternal Grandmother     Cancer Maternal Grandfather     Cancer Paternal Grandmother     Cancer Paternal Grandfather        SOCIAL HISTORY  Social History     Socioeconomic History    Marital status:    Tobacco Use    Smoking status: Never    Smokeless tobacco: Never   Vaping Use    Vaping Use: Never used   Substance and Sexual Activity    Alcohol use: Yes     Comment: 3 x wk     Drug use: Yes     Types: Marijuana    Sexual activity: Defer       ALLERGIES  Ativan [lorazepam] and Buspar [buspirone]    PHYSICAL EXAM  Physical Exam  GENERAL:   Appears in no acute distress.  HENT: Nares patent.  EYES: No scleral icterus.  CV: Regular rhythm, regular rate.  RESPIRATORY: Normal effort.  MUSCULOSKELETAL: No deformities.   NEURO: Alert, moves all extremities, follows commands.  SKIN: Warm, dry, no rash visualized.  PSYCH: Anxious  I have reviewed the triage vital signs and nursing notes.    LAB RESULTS    If labs were ordered, I independently reviewed the results and considered them in treating the patient.    RADIOLOGY  No orders to display     [] Radiologist's Report Reviewed:  I ordered and independently reviewed the above noted radiographic studies.  See radiologist's dictation for official interpretation.      PROCEDURES    Procedures    No orders to display       MEDICATIONS GIVEN IN ER    Medications   clonazePAM (KlonoPIN) tablet 1 mg (1 mg Oral Given 12/4/23 1946)       MEDICAL DECISION MAKING, PROGRESS, and CONSULTS   Medical Decision Making  Problems Addressed:  Anxiety: complicated acute illness or injury  Panic attacks: complicated acute illness or injury    Risk  Prescription drug management.        All labs have been independently reviewed by me.  All radiology studies have been reviewed by me and the radiologist dictating the report.  All EKG's have been  independently viewed by me.    [] Discussed with radiology regarding test interpretation:    Discussion below represents my analysis of pertinent findings related to patient's condition, differential diagnosis, treatment plan and final disposition.    Differential diagnosis:  The differential diagnosis associated with the patient's presentation includes: Anxiety reaction / panic attack  Additional sources  Discussed/ obtained information from independent historians:   [] Spouse  [] Parent  [] Family member  [] Friend  [] EMS   [] Other:  External (non-ED) record review:   [] Inpatient record:   [] Office record:   [] Outpatient record:   [] Prior Outpatient labs:   [] Prior Outpatient radiology:   [x] Primary Care record: Personally reviewed most recent primary care visits with diagnosis of recurrent major depressive disorder and generalized anxiety disorder   [] Outside ED record:   [] Other:   Patient's care impacted by:   [x] Diabetes  [x] Hypertension  [] Hyperlipidemia  [] Hypothyroidism   [] Coronary Artery Disease   [] COPD   [] Cancer   [] Obesity  [] GERD   [] Tobacco Abuse   [] Substance Abuse    [x] Anxiety   [] Depression   [] Other:   Care significantly affected by Social Determinants of Health (housing and economic circumstances, unemployment)    [] Yes     [x] No   If yes, Patient's care significantly limited by  Social Determinants of Health including:   [] Inadequate housing   [] Low income   [] Alcoholism and drug addiction in family   [] Problems related to primary support group   [] Unemployment   [] Problems related to employment   [] Other Social Determinants of Health:     Shared decision making:  I had a discussion with the patient/family regarding diagnosis, diagnostic results, treatment plan, and medications.  The patient/family indicated understanding of these instructions.  I spent adequate time at the bedside preceding discharge necessary to personally discuss the aftercare instructions,  giving patient education, providing explanations of the results of our evaluations/findings, and my decision making to assure that the patient/family understand the plan of care.  Time was allotted to answer questions at that time and throughout the ED course.  Emphasis was placed on timely follow-up after discharge.  I also discussed the potential for the development of an acute emergent condition requiring further evaluation, admission, or even surgical intervention. I discussed that we found nothing during the visit today indicating the need for further workup, admission, or the presence of an unstable medical condition.  I encouraged the patient to return to the emergency department immediately for ANY concerns, worsening, new complaints, or if symptoms persist and unable to seek follow-up in a timely fashion.  The patient/family expressed understanding and agreement with this plan.  The patient will follow-up with primary care, psychiatry and behavioral health for reevaluation.      Orders placed during this visit:  No orders of the defined types were placed in this encounter.      ED Course:    ED Course as of 12/05/23 0348   Tue Dec 05, 2023   0344 Patient with history of anxiety and panic attacks from trauma of being electrocuted 3 years prior. Prescribed Ativan for anxiety but reports it is no longer working for him. Not suicidal or homicidal.  Behavioral health assessment declined. Patient provided one dose of clonazepam in ED and instructed to keep follow up with PCP, behavioral health and psychiatry as scheduled.  [JG]      ED Course User Index  [JG] Sky Stewart PA            DIAGNOSIS  Final diagnoses:   Anxiety   Panic attacks       DISPOSITION    ED Disposition       ED Disposition   Discharge    Condition   Stable    Comment   --               Please note that portions of this document were completed with voice recognition software.        Sky Stewart PA  12/05/23 0348

## 2023-12-05 NOTE — TELEPHONE ENCOUNTER
Caller: YADYSARAH    Relationship: Emergency Contact    Best call back number: [phone]    What medication are you requesting: KLONIPIN 1MG    If a prescription is needed, what is your preferred pharmacy and phone number:    [facility] [phone]  Additional notes:    PATIENT WAS SEEN IN ER YESTERDAY AND WAS GIVEN THIS MEDICATION AND PATIENT SLEPT REALLY WELL AND HELPED A LOT HOWEVER ER DID NOT GIVE A SCRIPT. PATIENT SEES DR RUBIO TOMORROW HOWEVER WOULD LIKE 2 PILLS UNTIL HIS APPOINTMENT. PLEASE ADVISE

## 2023-12-06 ENCOUNTER — OFFICE VISIT (OUTPATIENT)
Dept: FAMILY MEDICINE CLINIC | Facility: CLINIC | Age: 63
End: 2023-12-06
Payer: MEDICAID

## 2023-12-06 VITALS
HEART RATE: 70 BPM | DIASTOLIC BLOOD PRESSURE: 88 MMHG | SYSTOLIC BLOOD PRESSURE: 142 MMHG | BODY MASS INDEX: 29.29 KG/M2 | WEIGHT: 221 LBS | HEIGHT: 73 IN | OXYGEN SATURATION: 97 %

## 2023-12-06 DIAGNOSIS — F41.9 ANXIETY AND DEPRESSION: ICD-10-CM

## 2023-12-06 DIAGNOSIS — F32.A ANXIETY AND DEPRESSION: ICD-10-CM

## 2023-12-06 DIAGNOSIS — R03.0 ELEVATED BLOOD PRESSURE READING: ICD-10-CM

## 2023-12-06 DIAGNOSIS — F41.0 PANIC ATTACKS: Primary | ICD-10-CM

## 2023-12-06 RX ORDER — CLONAZEPAM 1 MG/1
1 TABLET ORAL 2 TIMES DAILY PRN
Qty: 60 TABLET | Refills: 2 | Status: SHIPPED | OUTPATIENT
Start: 2023-12-06

## 2023-12-06 NOTE — PROGRESS NOTES
Chief Complaint   Patient presents with    Anxiety     Follow up, pt states current medication not working.        HPI:  Félix Brian is a 63 y.o. male who presents today for recurrent panic attacks. Recently prescribed Klonopin in the ER which was more effective than Ativan.  Recently lost a cat which caused worsening anxiety.  Has an appointment with psychiatry scheduled for next month.    ROS:  Constitutional: no fevers, night sweats or unexplained weight loss  Eyes: no vision changes  ENT: no runny nose, ear pain, sore throat  Cardio: no chest pain, palpitations  Pulm: no shortness of breath, wheezing, or cough  GI: no abdominal pain or changes in bowel movements  : no difficulty urinating  MSK: no difficulty ambulating, no joint pain  Neuro: no weakness, dizziness or headache  Psych: no trouble sleeping  Endo: no change in appetite      Past Medical History:   Diagnosis Date    Arthritis     [place] esophagus     Electrocution 02/2021    GERD (gastroesophageal reflux disease)     Hypertension     Memory loss       Family History   Problem Relation Age of Onset    Cancer Mother     No Known Problems Father     Diabetes Brother     Cancer Maternal Grandmother     Cancer Maternal Grandfather     Cancer Paternal Grandmother     Cancer Paternal Grandfather       Social History     Socioeconomic History    Marital status:    Tobacco Use    Smoking status: Never    Smokeless tobacco: Never   Vaping Use    Vaping Use: Never used   Substance and Sexual Activity    Alcohol use: Yes     Comment: 3 x wk     Drug use: Yes     Types: Marijuana    Sexual activity: Defer      Allergies   Allergen Reactions    Ativan [Lorazepam] Mental Status Change     Headaches, confusion    Buspar [Buspirone] Mental Status Change        There is no immunization history on file for this patient.     PE:  Vitals:    12/06/23 1111   BP: 142/88   Pulse: 70   SpO2: 97%      Body mass index is 29.16 kg/m².    Gen Appearance:  NAD  HEENT: Normocephalic, PERRLA, no thyromegaly, trache midline  Heart: RRR, normal S1 and S2, no murmur  Lungs: CTA b/l, no wheezing, no crackles  Abdomen: Soft, non-tender, non-distended, no guarding and BSx4  MSK: Moves all extremities well, normal gait, no peripheral edema  Pulses: Palpable and equal b/l  Lymph nodes: No palpable lymphadenopathy   Neuro: No focal deficits      Current Outpatient Medications   Medication Sig Dispense Refill    clonazePAM (KlonoPIN) 1 MG tablet Take 1 tablet by mouth 2 (Two) Times a Day As Needed for Anxiety. 60 tablet 2    hydrOXYzine (ATARAX) 50 MG tablet TAKE 1 TABLET BY MOUTH AT NIGHT AS NEEDED FOR ANXIETY 30 tablet 2    OMEPRAZOLE PO Take  by mouth.       No current facility-administered medications for this visit.      DC Ativan, switching to Klonopin as needed.  Follow-up with behavioral health as scheduled next month.  Therapy scheduled for next week.  Elevated blood pressure likely due to stress.  Continue to monitor.    Diagnoses and all orders for this visit:    1. Panic attacks (Primary)  -     clonazePAM (KlonoPIN) 1 MG tablet; Take 1 tablet by mouth 2 (Two) Times a Day As Needed for Anxiety.  Dispense: 60 tablet; Refill: 2    2. Anxiety and depression    3. Elevated blood pressure reading         No follow-ups on file.     Dictated Utilizing Dragon Dictation    Please note that portions of this note were completed with a voice recognition program.    Part of this note may be an electronic transcription/translation of spoken language to printed text using the Dragon Dictation System.

## 2023-12-12 ENCOUNTER — TELEMEDICINE (OUTPATIENT)
Dept: PSYCHIATRY | Facility: CLINIC | Age: 63
End: 2023-12-12
Payer: MEDICAID

## 2023-12-12 DIAGNOSIS — F33.2 SEVERE EPISODE OF RECURRENT MAJOR DEPRESSIVE DISORDER, WITHOUT PSYCHOTIC FEATURES: Primary | ICD-10-CM

## 2023-12-12 DIAGNOSIS — F41.1 GENERALIZED ANXIETY DISORDER: ICD-10-CM

## 2023-12-12 DIAGNOSIS — F43.10 POST TRAUMATIC STRESS DISORDER (PTSD): ICD-10-CM

## 2023-12-12 PROCEDURE — 90837 PSYTX W PT 60 MINUTES: CPT | Performed by: COUNSELOR

## 2023-12-12 NOTE — PROGRESS NOTES
Date: December 12, 2023  Time In: 2:30PM  Time Out: 3:30PM  This provider is located at [place], [place] for [facility] (through [facility]), [address], [place] [address] using a secure [employer] Video Visit through [employer]. Patient is being seen remotely via telehealth at home address in [place] and stated they are in a secure environment for this session. The patient's condition being diagnosed/treated is appropriate for telemedicine. The provider identified herself as well as her credentials. The patient, and/or patients guardian, consent to be seen remotely, and when consent is given they understand that the consent allows for patient identifiable information to be sent to a third party as needed. They may refuse to be seen remotely at any time. The electronic data is encrypted and password protected, and the patient and/or guardian has been advised of the potential risks to privacy not withstanding such measures.     You have chosen to receive care through a telehealth visit.  Do you consent to use a video/audio connection for your medical care today? Yes    PROGRESS NOTE  Data:  Félix Brian is a 63 y.o. male who presents today for follow up    Chief Complaint: Depression, PTSD, Anxiety    History of Present Illness: Patient reports fluctuating depression and anxiety through the week. Patient reports losing another pet last week which has been difficult to process. Patient reports going over the edge when the cat passed and reaching out to his sister for help. Patient reports his sister's  came and got him into the hospital. Patient reports being told he was having a reaction to his medications. Patient reports frustration that his medications were not working and he feel were making symptoms worse. Patient reports finally getting new medication which has helped decrease symptoms. Patient reports his sister stayed with him while he was  transitioning to new medication. Patient reports struggling with some loneness and having health problems has increased anxiety. Patient reports trauma has impacted his entire life and he is working to try to be a better person and try to continue to help others. Patient reports he is working towards setting short term goals, increasing social activities when possible, and taking time for relaxation through the week.        Clinical Maneuvering/Intervention: CBT, MI, Patient Centered    (Scales based on 0 - 10 with 10 being the worst)  Depression: 5 Anxiety: 6       Assisted patient in processing above session content; acknowledged and normalized patient’s thoughts, feelings, and concerns.  Rationalized patient thought process regarding recent stressors and life events. Discussed triggers associated with patient's emotions. Utilized CBT to process through and correct irrational cognitions with emphasis on  medication adjustment and adherence . Also discussed coping skills for patient to implement. Discussed recent events leading to going to the hospital and eventually having medications changed which has been a positive change. Processed through thoughts and emotions surrounding loss and grief, trauma, and frustration with negative people in the world. Discussed continued work towards goal setting, engaging in more social activities including going to the [employer], and increasing self care.     Allowed patient to freely discuss issues without interruption or judgment. Provided safe, confidential environment to facilitate the development of positive therapeutic relationship and encourage open, honest communication. Assisted patient in identifying risk factors which would indicate the need for higher level of care including thoughts to harm self or others and/or self-harming behavior and encouraged patient to contact this office, call 911, or present to the nearest emergency room should any of these events occur.  Discussed crisis intervention services and means to access. Patient adamantly and convincingly denies current suicidal or homicidal ideation or perceptual disturbance.    Assessment:   Assessment   Patient appears to maintain relative stability as compared to their baseline.  However, patient continues to struggle with anxiety and depression which continues to cause impairment in important areas of functioning.  A result, they can be reasonably expected to continue to benefit from treatment and would likely be at increased risk for decompensation otherwise.    Mental Status Exam:   Hygiene:   good  Cooperation:  Cooperative  Eye Contact:  Good  Psychomotor Behavior:  Appropriate  Affect:  Restricted  Mood: fluctates  Speech:  Normal  Thought Process:  Linear  Thought Content:  Mood congruent  Suicidal:  None  Homicidal:  None  Hallucinations:  None  Delusion:  None  Memory:  Intact  Orientation:  Person, Place, Time and Situation  Reliability:  fair  Insight:  Fair  Judgement:  Fair  Impulse Control:  Fair  Physical/Medical Issues:  No        Patient's Support Network Includes:  extended family    Functional Status: Mild impairment     Progress toward goal: Patient is working towards practicing the ABC's of CBT model while at home to process through and correct or improve cognitions. Patient is making progress on processing thoughts and emotions during sessions, utilizing positive thinking strategies, daily positive affirmations and self care practices.     Prognosis: Good with Ongoing Treatment            Plan:    Patient will continue in individual outpatient therapy with focus on improved functioning and coping skills, maintaining stability, and avoiding decompensation and the need for higher level of care.    Patient will adhere to medication regimen as prescribed and report any side effects. Patient will contact this office, call 911 or present to the nearest emergency room should suicidal or homicidal  ideations occur. Provide Cognitive Behavioral Therapy and Solution Focused Therapy to improve functioning, maintain stability, and avoid decompensation and the need for higher level of care.     Return in about 3 weeks, or earlier if symptoms worsen or fail to improve.           VISIT DIAGNOSIS:     ICD-10-CM ICD-9-CM   1. Severe episode of recurrent major depressive disorder, without psychotic features  F33.2 296.33   2. Generalized anxiety disorder  F41.1 300.02   3. Post traumatic stress disorder (PTSD)  F43.10 309.81        Diagnoses and all orders for this visit:    1. Severe episode of recurrent major depressive disorder, without psychotic features (Primary)    2. Generalized anxiety disorder    3. Post traumatic stress disorder (PTSD)           [facility] No Show Policy:  We understand unexpected circumstances arise; however, anytime you miss your appointment we are unable to provide you appropriate care.  In addition, each appointment missed could have been used to provide care for others.  We ask that you call at least 24 hours in advance to cancel or reschedule an appointment.  We would like to take this opportunity to remind you of our policy stating patients who miss THREE or more appointments without cancelling or rescheduling 24 hours in advance of the appointment may be subject to cancellation of any further visits with our clinic and recommendation to seek in-person services/visits.    Please call [phone] to reschedule your appointment. If there are reasons that make it difficult for you to keep the appointments, please call and let us know how we can help.  Please understand that medication prescribing will not continue without seeing your provider.      [facility]'s No Show Policy reviewed with patient at today's visit. Patient verbalized understanding of this policy. Discussed with patient that in the event that there are three or more no show  visits, it will be recommended that they pursue in-person services/visits as noncompliance with telehealth visits indicates that patient is not an appropriate candidate for telemedicine and would likely be more appropriate for in-person services/visits. Patient verbalizes understanding and is agreeable to this.       This document has been electronically signed by Fadi Mcintyre III, LCSW  December 12, 2023 15:23 EST      Part of this note may be an electronic transcription/translation of spoken language to printed text using the Dragon Dictation System.

## 2023-12-22 NOTE — TELEPHONE ENCOUNTER
Rx Refill Note  Requested Prescriptions      No prescriptions requested or ordered in this encounter      Last office visit with prescribing clinician: 12/6/2023   Last telemedicine visit with prescribing clinician: Visit date not found   Next office visit with prescribing clinician: 1/18/2024                         Would you like a call back once the refill request has been completed: [] Yes [] No    If the office needs to give you a call back, can they leave a voicemail: [] Yes [] No    Margarita Wei MA  12/22/23, 08:36 EST

## 2023-12-28 DIAGNOSIS — F41.0 PANIC ATTACKS: ICD-10-CM

## 2023-12-28 RX ORDER — CLONAZEPAM 1 MG/1
1 TABLET ORAL 2 TIMES DAILY PRN
Qty: 60 TABLET | Refills: 2 | Status: CANCELLED | OUTPATIENT
Start: 2023-12-28

## 2023-12-28 NOTE — TELEPHONE ENCOUNTER
Rx Refill Note  Requested Prescriptions     Pending Prescriptions Disp Refills    clonazePAM (KlonoPIN) 1 MG tablet 60 tablet 2     Sig: Take 1 tablet by mouth 2 (Two) Times a Day As Needed for Anxiety.      Last office visit with prescribing clinician: 12/6/2023     Next office visit with prescribing clinician: 1/18/2024     CSA:4/10/23  UDS:4/10/23    Marybeth Adams MA  12/28/23, 13:19 EST

## 2023-12-28 NOTE — TELEPHONE ENCOUNTER
"Caller: Félix Brian \"Ramesh\"    Relationship: Self    Best call back number: [phone]     THE CLONAZEPAM IS NOT DUE FOR REFILL. BUT, HE LOST HIS DAD YESTERDAY AND WILL BE OUT OF TOWN STARTING ON SUNDAY. HE WOULD LIKE TO  A REFILL BEFORE SUNDAY.    "

## 2024-01-16 ENCOUNTER — OFFICE VISIT (OUTPATIENT)
Age: 64
End: 2024-01-16
Payer: MEDICAID

## 2024-01-16 VITALS
HEART RATE: 68 BPM | SYSTOLIC BLOOD PRESSURE: 132 MMHG | HEIGHT: 73 IN | WEIGHT: 227.5 LBS | BODY MASS INDEX: 30.15 KG/M2 | OXYGEN SATURATION: 97 % | DIASTOLIC BLOOD PRESSURE: 86 MMHG

## 2024-01-16 DIAGNOSIS — F41.1 GENERALIZED ANXIETY DISORDER: Primary | ICD-10-CM

## 2024-01-16 DIAGNOSIS — Z51.81 ENCOUNTER FOR THERAPEUTIC DRUG LEVEL MONITORING: ICD-10-CM

## 2024-01-16 PROCEDURE — 3079F DIAST BP 80-89 MM HG: CPT | Performed by: STUDENT IN AN ORGANIZED HEALTH CARE EDUCATION/TRAINING PROGRAM

## 2024-01-16 PROCEDURE — 1160F RVW MEDS BY RX/DR IN RCRD: CPT | Performed by: STUDENT IN AN ORGANIZED HEALTH CARE EDUCATION/TRAINING PROGRAM

## 2024-01-16 PROCEDURE — 1159F MED LIST DOCD IN RCRD: CPT | Performed by: STUDENT IN AN ORGANIZED HEALTH CARE EDUCATION/TRAINING PROGRAM

## 2024-01-16 PROCEDURE — 3075F SYST BP GE 130 - 139MM HG: CPT | Performed by: STUDENT IN AN ORGANIZED HEALTH CARE EDUCATION/TRAINING PROGRAM

## 2024-01-16 PROCEDURE — 90792 PSYCH DIAG EVAL W/MED SRVCS: CPT | Performed by: STUDENT IN AN ORGANIZED HEALTH CARE EDUCATION/TRAINING PROGRAM
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
## 2024-01-20 LAB
1OH-MIDAZOLAM UR QL SCN: NOT DETECTED NG/MG CREAT
6MAM UR QL SCN: NEGATIVE NG/ML
7AMINOCLONAZEPAM/CREAT UR: 308 NG/MG CREAT
A-OH ALPRAZ/CREAT UR: NOT DETECTED NG/MG CREAT
A-OH-TRIAZOLAM/CREAT UR CFM: NOT DETECTED NG/MG CREAT
ACP UR QL CFM: NOT DETECTED
ALPRAZ/CREAT UR CFM: NOT DETECTED NG/MG CREAT
AMPHETAMINES UR QL SCN: NEGATIVE NG/ML
APAP UR QL SCN: NEGATIVE UG/ML
BARBITURATES UR QL SCN: NEGATIVE NG/ML
BENZODIAZ SCN METH UR: ABNORMAL
BUPRENORPHINE UR QL SCN: NEGATIVE
BUPRENORPHINE/CREAT UR: NOT DETECTED NG/MG CREAT
CANNABINOIDS UR QL CFM: NORMAL
CANNABINOIDS UR QL SCN: ABNORMAL NG/ML
CARBOXYTHC UR CFM-MCNC: 450 NG/MG CREAT
CARISOPRODOL UR QL: NEGATIVE NG/ML
CLONAZEPAM/CREAT UR CFM: NOT DETECTED NG/MG CREAT
COCAINE+BZE UR QL SCN: NEGATIVE NG/ML
CREAT UR-MCNC: 12 MG/DL
D-METHORPHAN UR-MCNC: NOT DETECTED NG/ML
D-METHORPHAN+LEVORPHANOL UR QL: NOT DETECTED
DESALKYLFLURAZ/CREAT UR: NOT DETECTED NG/MG CREAT
DIAZEPAM/CREAT UR: NOT DETECTED NG/MG CREAT
ETHANOL UR QL SCN: NEGATIVE G/DL
ETHANOL UR QL SCN: NEGATIVE NG/ML
FENTANYL CTO UR SCN-MCNC: NEGATIVE NG/ML
FENTANYL/CREAT UR: NOT DETECTED NG/MG CREAT
FLUNITRAZEPAM UR QL SCN: NOT DETECTED NG/MG CREAT
GABAPENTIN UR-MCNC: NEGATIVE UG/ML
HYPNOTICS UR QL SCN: NEGATIVE
KETAMINE UR QL: NOT DETECTED
LORAZEPAM/CREAT UR: NOT DETECTED NG/MG CREAT
MEPERIDINE UR QL SCN: NEGATIVE NG/ML
METHADONE UR QL SCN: NEGATIVE NG/ML
METHADONE+METAB UR QL SCN: NEGATIVE NG/ML
MIDAZOLAM/CREAT UR CFM: NOT DETECTED NG/MG CREAT
MISCELLANEOUS, UR: NEGATIVE
NORBUPRENORPHINE/CREAT UR: NOT DETECTED NG/MG CREAT
NORDIAZEPAM/CREAT UR: NOT DETECTED NG/MG CREAT
NORFENTANYL/CREAT UR: NOT DETECTED NG/MG CREAT
NORFLUNITRAZEPAM UR-MCNC: NOT DETECTED NG/MG CREAT
NORKETAMINE UR-MCNC: NOT DETECTED UG/ML
OPIATES UR SCN-MCNC: NEGATIVE NG/ML
OTHER HALLUCINOGENS UR: NEGATIVE
OXAZEPAM/CREAT UR: NOT DETECTED NG/MG CREAT
OXYCODONE CTO UR SCN-MCNC: NEGATIVE NG/ML
PCP UR QL SCN: NEGATIVE NG/ML
PRESCRIBED MEDICATIONS: ABNORMAL
PROPOXYPH UR QL SCN: NEGATIVE NG/ML
TAPENTADOL CTO UR SCN-MCNC: NEGATIVE NG/ML
TEMAZEPAM/CREAT UR: NOT DETECTED NG/MG CREAT
TRAMADOL UR QL SCN: NEGATIVE NG/ML
ZALEPLON UR-MCNC: NOT DETECTED NG/ML
ZOLPIDEM PHENYL-4-CARB UR QL SCN: NOT DETECTED
ZOLPIDEM UR QL SCN: NOT DETECTED
ZOPICLONE-N-OXIDE UR-MCNC: NOT DETECTED NG/ML

## 2024-01-23 ENCOUNTER — LAB (OUTPATIENT)
Dept: LAB | Facility: HOSPITAL | Age: 64
End: 2024-01-23
Payer: MEDICAID

## 2024-01-23 DIAGNOSIS — Z00.00 PREVENTATIVE HEALTH CARE: ICD-10-CM

## 2024-01-23 DIAGNOSIS — E55.9 VITAMIN D DEFICIENCY: ICD-10-CM

## 2024-01-23 LAB
25(OH)D3 SERPL-MCNC: 19 NG/ML (ref 30–100)
ALBUMIN SERPL-MCNC: 4.5 G/DL (ref 3.5–5.2)
ALBUMIN/GLOB SERPL: 1.6 G/DL
ALP SERPL-CCNC: 84 U/L (ref 39–117)
ALT SERPL W P-5'-P-CCNC: 23 U/L (ref 1–41)
ANION GAP SERPL CALCULATED.3IONS-SCNC: 10 MMOL/L (ref 5–15)
AST SERPL-CCNC: 26 U/L (ref 1–40)
BASOPHILS # BLD AUTO: 0.03 10*3/MM3 (ref 0–0.2)
BASOPHILS NFR BLD AUTO: 0.5 % (ref 0–1.5)
BILIRUB SERPL-MCNC: 0.5 MG/DL (ref 0–1.2)
BILIRUB UR QL STRIP: NEGATIVE
BUN SERPL-MCNC: 15 MG/DL (ref 8–23)
BUN/CREAT SERPL: 12.6 (ref 7–25)
CALCIUM SPEC-SCNC: 9.7 MG/DL (ref 8.6–10.5)
CHLORIDE SERPL-SCNC: 101 MMOL/L (ref 98–107)
CHOLEST SERPL-MCNC: 227 MG/DL (ref 0–200)
CLARITY UR: CLEAR
CO2 SERPL-SCNC: 27 MMOL/L (ref 22–29)
COLOR UR: YELLOW
CREAT SERPL-MCNC: 1.19 MG/DL (ref 0.76–1.27)
DEPRECATED RDW RBC AUTO: 49.1 FL (ref 37–54)
EGFRCR SERPLBLD CKD-EPI 2021: 69.1 ML/MIN/1.73
EOSINOPHIL # BLD AUTO: 0.07 10*3/MM3 (ref 0–0.4)
EOSINOPHIL NFR BLD AUTO: 1.2 % (ref 0.3–6.2)
ERYTHROCYTE [DISTWIDTH] IN BLOOD BY AUTOMATED COUNT: 14.6 % (ref 12.3–15.4)
GLOBULIN UR ELPH-MCNC: 2.8 GM/DL
GLUCOSE SERPL-MCNC: 112 MG/DL (ref 65–99)
GLUCOSE UR STRIP-MCNC: NEGATIVE MG/DL
HBA1C MFR BLD: 5.6 % (ref 4.8–5.6)
HCT VFR BLD AUTO: 46 % (ref 37.5–51)
HDLC SERPL-MCNC: 60 MG/DL (ref 40–60)
HGB BLD-MCNC: 16.2 G/DL (ref 13–17.7)
HGB UR QL STRIP.AUTO: NEGATIVE
HOLD SPECIMEN: NORMAL
IMM GRANULOCYTES # BLD AUTO: 0.02 10*3/MM3 (ref 0–0.05)
IMM GRANULOCYTES NFR BLD AUTO: 0.3 % (ref 0–0.5)
KETONES UR QL STRIP: NEGATIVE
LDLC SERPL CALC-MCNC: 149 MG/DL (ref 0–100)
LDLC/HDLC SERPL: 2.44 {RATIO}
LEUKOCYTE ESTERASE UR QL STRIP.AUTO: NEGATIVE
LYMPHOCYTES # BLD AUTO: 1.83 10*3/MM3 (ref 0.7–3.1)
LYMPHOCYTES NFR BLD AUTO: 30.1 % (ref 19.6–45.3)
MCH RBC QN AUTO: 32.6 PG (ref 26.6–33)
MCHC RBC AUTO-ENTMCNC: 35.2 G/DL (ref 31.5–35.7)
MCV RBC AUTO: 92.6 FL (ref 79–97)
MONOCYTES # BLD AUTO: 0.43 10*3/MM3 (ref 0.1–0.9)
MONOCYTES NFR BLD AUTO: 7.1 % (ref 5–12)
NEUTROPHILS NFR BLD AUTO: 3.69 10*3/MM3 (ref 1.7–7)
NEUTROPHILS NFR BLD AUTO: 60.8 % (ref 42.7–76)
NITRITE UR QL STRIP: NEGATIVE
NRBC BLD AUTO-RTO: 0 /100 WBC (ref 0–0.2)
PH UR STRIP.AUTO: 7 [PH] (ref 5–8)
PLATELET # BLD AUTO: 218 10*3/MM3 (ref 140–450)
PMV BLD AUTO: 10.6 FL (ref 6–12)
POTASSIUM SERPL-SCNC: 5.1 MMOL/L (ref 3.5–5.2)
PROT SERPL-MCNC: 7.3 G/DL (ref 6–8.5)
PROT UR QL STRIP: NEGATIVE
RBC # BLD AUTO: 4.97 10*6/MM3 (ref 4.14–5.8)
SODIUM SERPL-SCNC: 138 MMOL/L (ref 136–145)
SP GR UR STRIP: 1.01 (ref 1–1.03)
T4 FREE SERPL-MCNC: 0.95 NG/DL (ref 0.93–1.7)
TRIGL SERPL-MCNC: 104 MG/DL (ref 0–150)
TSH SERPL DL<=0.05 MIU/L-ACNC: 1.34 UIU/ML (ref 0.27–4.2)
UROBILINOGEN UR QL STRIP: NORMAL
VLDLC SERPL-MCNC: 18 MG/DL (ref 5–40)
WBC NRBC COR # BLD AUTO: 6.07 10*3/MM3 (ref 3.4–10.8)

## 2024-01-23 PROCEDURE — 80061 LIPID PANEL: CPT

## 2024-01-23 PROCEDURE — 83036 HEMOGLOBIN GLYCOSYLATED A1C: CPT

## 2024-01-23 PROCEDURE — 84439 ASSAY OF FREE THYROXINE: CPT

## 2024-01-23 PROCEDURE — 80050 GENERAL HEALTH PANEL: CPT

## 2024-01-23 PROCEDURE — 81003 URINALYSIS AUTO W/O SCOPE: CPT

## 2024-01-23 PROCEDURE — 82306 VITAMIN D 25 HYDROXY: CPT

## 2024-01-25 ENCOUNTER — OFFICE VISIT (OUTPATIENT)
Dept: FAMILY MEDICINE CLINIC | Facility: CLINIC | Age: 64
End: 2024-01-25
Payer: MEDICAID

## 2024-01-25 VITALS
WEIGHT: 221 LBS | BODY MASS INDEX: 29.29 KG/M2 | OXYGEN SATURATION: 99 % | TEMPERATURE: 97.8 F | DIASTOLIC BLOOD PRESSURE: 82 MMHG | SYSTOLIC BLOOD PRESSURE: 144 MMHG | HEIGHT: 73 IN | HEART RATE: 82 BPM

## 2024-01-25 DIAGNOSIS — F41.0 PANIC ATTACK DUE TO POST TRAUMATIC STRESS DISORDER (PTSD): Primary | ICD-10-CM

## 2024-01-25 DIAGNOSIS — F43.10 PANIC ATTACK DUE TO POST TRAUMATIC STRESS DISORDER (PTSD): Primary | ICD-10-CM
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
## 2024-01-26 RX ORDER — HYDROXYZINE 50 MG/1
TABLET, FILM COATED ORAL
Qty: 30 TABLET | Refills: 5 | Status: SHIPPED | OUTPATIENT
Start: 2024-01-26

## 2024-02-08 ENCOUNTER — OFFICE VISIT (OUTPATIENT)
Dept: BEHAVIORAL HEALTH | Facility: CLINIC | Age: 64
End: 2024-02-08
Payer: MEDICAID

## 2024-02-08 VITALS — HEIGHT: 73 IN | WEIGHT: 229 LBS | BODY MASS INDEX: 30.35 KG/M2

## 2024-02-08 DIAGNOSIS — F06.4 ANXIETY DISORDER DUE TO GENERAL MEDICAL CONDITION WITH PANIC ATTACK: Primary | ICD-10-CM

## 2024-02-08 DIAGNOSIS — F41.1 GENERALIZED ANXIETY DISORDER: ICD-10-CM

## 2024-02-08 DIAGNOSIS — F41.0 PANIC ATTACKS: ICD-10-CM

## 2024-02-08 DIAGNOSIS — F41.0 ANXIETY DISORDER DUE TO GENERAL MEDICAL CONDITION WITH PANIC ATTACK: Primary | ICD-10-CM

## 2024-02-08 RX ORDER — CLONAZEPAM 1 MG/1
1 TABLET ORAL 2 TIMES DAILY PRN
Qty: 60 TABLET | Refills: 2 | Status: SHIPPED | OUTPATIENT
Start: 2024-02-08

## 2024-02-08 RX ORDER — DESVENLAFAXINE SUCCINATE 50 MG/1
50 TABLET, EXTENDED RELEASE ORAL DAILY
Qty: 30 TABLET | Refills: 1 | Status: SHIPPED | OUTPATIENT
Start: 2024-02-08

## 2024-02-08 NOTE — PATIENT INSTRUCTIONS
"[facility], Holly Mendez Boom    [facility], Panfilo Gordon called \"Panic Last\"  "
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
## 2024-02-19 ENCOUNTER — TELEMEDICINE (OUTPATIENT)
Dept: PSYCHIATRY | Facility: CLINIC | Age: 64
End: 2024-02-19
Payer: MEDICAID

## 2024-02-19 DIAGNOSIS — F33.2 SEVERE EPISODE OF RECURRENT MAJOR DEPRESSIVE DISORDER, WITHOUT PSYCHOTIC FEATURES: Primary | ICD-10-CM

## 2024-02-19 DIAGNOSIS — F41.1 GENERALIZED ANXIETY DISORDER: ICD-10-CM

## 2024-02-19 DIAGNOSIS — F43.10 POST TRAUMATIC STRESS DISORDER (PTSD): ICD-10-CM

## 2024-02-19 PROCEDURE — 90837 PSYTX W PT 60 MINUTES: CPT | Performed by: COUNSELOR
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
## 2024-03-06 NOTE — PROGRESS NOTES
"               Follow Up Office Visit      Date: 2024   Patient Name: Félix Brian  : 1960   MRN: [id number]     Referring Provider: Ileana An PA-C    Chief Complaint:      ICD-10-CM ICD-9-CM   1. Anxiety disorder due to general medical condition with panic attack  F06.4 293.84    F41.0 300.01        History of Present Illness:   Félix Brian is a 63 y.o. male who is here today for follow up with medication management for an anxiety disorder. At last visit, we attempted to start Pristiq 50 mg. He reports, \"It perry near killed me.\" He took it one evening, and says it was so sedating that he couldn't leave his bed the next day. A few days later, he tried the medication again, and had the same result. He is still willing to try and establish with a daily anxiety medication, but is anxious about potential side effects. He reports daily panic episodes that involve shortness of breath and palpitations, that he can get under control with medication and meditation. He reports frequent emotional episodes, often crying without really knowing the reason, or crying about things he normally would not cry about. He still has issues with his memory, often waking up and not remembering that his father has passed away; each day as he realized the events of the last six months, he experiences grief again. Supportive factors include getting up and taking care of his animals, and he expresses a desire to have minimal sedating medication; he believes that activity will help him get back to normal. He discussed wanting a new primary provider, preferably in this office so that all his providers can collaborate. Patient denies SI/HI/psychotic/manic symptoms.     Subjective     Review of Systems:   Review of Systems   Psychiatric/Behavioral:  Negative for stress (short term memory loss). The patient is nervous/anxious.        Screening Scores:   PHQ-9 : 10 (last visit, 20)  LEONOR-7 : 13 (last " visit, 14)    Medications:     Current Outpatient Medications:   •  hydrOXYzine (ATARAX) 50 MG tablet, Take 1 tablet by mouth 4 (Four) Times a Day As Needed for Anxiety (and insomnia)., Disp: 120 tablet, Rfl: 1  •  clonazePAM (KlonoPIN) 1 MG tablet, Take 1 tablet by mouth 2 (Two) Times a Day As Needed for Anxiety., Disp: 60 tablet, Rfl: 2  •  Levomilnacipran HCl ER 20 MG capsule sustained-release 24 hr, Take 20 mg by mouth Daily., Disp: 30 capsule, Rfl: 0  •  OMEPRAZOLE PO, Take  by mouth., Disp: , Rfl:     Allergies:   Allergies   Allergen Reactions   • Ativan [Lorazepam] Mental Status Change     Headaches, confusion   • Buspar [Buspirone] Mental Status Change       Results Reviewed: n/a     The following portion of the patient's history were reviewed and updated appropriately: allergies, current and past medications, family history, medical history and social history.    Objective     Vital Signs: There were no vitals filed for this visit.  There is no height or weight on file to calculate BMI.     Mental Status Exam:   MENTAL STATUS EXAM   General Appearance:  Cleanly groomed and dressed  Eye Contact:  Good eye contact  Attitude:  Cooperative  Motor Activity:  Normal gait, posture and restless  Muscle Strength:  Normal  Speech:  Normal rate, tone, volume, hyper talkative and rambling  Language:  Spontaneous  Mood and affect:  Anxious  Hopelessness:  2  Thought Process:  Logical and goal-directed  Hallucinations:  None  Suicidal Ideations:  Not present  Homicidal Ideation:  Not present  Sensorium:  Alert and clear  Orientation:  Person, place, time and situation  Immediate Recall, Recent, and Remote Memory:  Deficit noted  Attention Span/ Concentration:  Easily distracted  Fund of Knowledge:  Appropriate for age and educational level  Intellectual Functioning:  Average range  Insight:  Good  Judgement:  Good  Reliability:  Good  Impulse Control:  Good        SUICIDE RISK ASSESSMENT/CSSRS:  1. Does patient have  thoughts of suicide? no  2. Does patient have intent for suicide? no  3. Does patient have a current plan for suicide? no  4. History of suicide attempts: no  5. Family history of suicide or attempts: no  6. History of violent behaviors towards others or property or thoughts of committing suicide: no  7. History of sexual aggression toward others: no  8. Access to firearms or weapons: no    Labs Reviewed: n/a  UDS Reviewed: n/a  Chart since last visit reviewed: yes    Assessment / Plan    Quality Measures:  Tobacco cessation: Patient denies tobacco use. No tobacco cessation education necessary.    Depression (PHQ >9): Addressed this visit, medication management, screening score monitoring, and supportive care    Medication Considerations:  Benzo: CSA on file  Stimulants: n/a   ERICK reviewed and appropriate.     Safety: No acute safety concerns    Risk Assessment: Risk of self-harm acutely is low. Risk of self-harm chronically is also low, but could be further elevated in the event of treatment noncompliance and/or AODA.    Visit Diagnosis/Orders Placed This Visit:  Diagnoses and all orders for this visit:    1. Anxiety disorder due to general medical condition with panic attack (Primary)  -     hydrOXYzine (ATARAX) 50 MG tablet; Take 1 tablet by mouth 4 (Four) Times a Day As Needed for Anxiety (and insomnia).  Dispense: 120 tablet; Refill: 1  -     Levomilnacipran HCl ER 20 MG capsule sustained-release 24 hr; Take 20 mg by mouth Daily.  Dispense: 30 capsule; Refill: 0  -     Ambulatory Referral to Neuropsychology         Impression/Formulation:  Patient appeared alert and oriented.  Patient is voluntarily continuing to receive psychiatric care at [facility].   Patient is receptive to assistance with maintaining a stable lifestyle.  Patient presents with history of     ICD-10-CM ICD-9-CM   1. Anxiety disorder due to general medical condition with panic attack  F06.4 293.84    F41.0 300.01   .      Treatment Plan:   Continue Hydroxyzine 50 mg as needed for insomnia; advised patient it's ok to try 100 mg to help with sleep. Continue Klonopin as previously prescribed. For anxiety and chronic nerve pain, start Fetzima; because of the patient's sensitivity to other medications, start at 20 mg (instead of the usual 40) and follow up in 4 weeks. Referral made to Neuropsychology at , and to new primary care provider, per patient request.     [facility] [place]     Patient will continue supportive psychotherapy efforts and medications as indicated.  Discussed medication options and treatment plan of prescribed medication(s) as well as the risks, benefits, and potential side effects. Patient will contact this office, call 911 or present to the nearest emergency room should suicidal or homicidal ideations occur. Clinic will obtain release of information for current treatment team for continuity of care as needed. Patient ackowledged and verbally consented to continue with current treatment plan and was educated on the importance of compliance with treatment and follow-up appointments.     Follow Up:   Return in about 4 weeks (around 4/4/2024) for Medication Management.    MARIANNA Butler  [facility]    This is electronically signed by MARIANNA Butler  03/07/2024 16:34 EST

## 2024-03-07 ENCOUNTER — OFFICE VISIT (OUTPATIENT)
Dept: BEHAVIORAL HEALTH | Facility: CLINIC | Age: 64
End: 2024-03-07
Payer: MEDICAID

## 2024-03-07 DIAGNOSIS — F06.4 ANXIETY DISORDER DUE TO GENERAL MEDICAL CONDITION WITH PANIC ATTACK: Primary | ICD-10-CM

## 2024-03-07 DIAGNOSIS — F41.0 ANXIETY DISORDER DUE TO GENERAL MEDICAL CONDITION WITH PANIC ATTACK: Primary | ICD-10-CM

## 2024-03-07 RX ORDER — HYDROXYZINE 50 MG/1
50 TABLET, FILM COATED ORAL 4 TIMES DAILY PRN
Qty: 120 TABLET | Refills: 1 | Status: SHIPPED | OUTPATIENT
Start: 2024-03-07

## 2024-03-13 ENCOUNTER — OFFICE VISIT (OUTPATIENT)
Dept: INTERNAL MEDICINE | Facility: CLINIC | Age: 64
End: 2024-03-13
Payer: MEDICAID

## 2024-03-13 VITALS
RESPIRATION RATE: 16 BRPM | HEART RATE: 75 BPM | WEIGHT: 221 LBS | SYSTOLIC BLOOD PRESSURE: 120 MMHG | BODY MASS INDEX: 29.29 KG/M2 | TEMPERATURE: 97.3 F | DIASTOLIC BLOOD PRESSURE: 88 MMHG | OXYGEN SATURATION: 98 % | HEIGHT: 73 IN

## 2024-03-13 DIAGNOSIS — F41.9 ANXIETY: ICD-10-CM

## 2024-03-13 DIAGNOSIS — F12.20: ICD-10-CM

## 2024-03-13 DIAGNOSIS — F10.20 ETOHISM: ICD-10-CM

## 2024-03-13 DIAGNOSIS — G47.33 OSA (OBSTRUCTIVE SLEEP APNEA): Primary | ICD-10-CM

## 2024-03-13 DIAGNOSIS — Z85.819 HISTORY OF THROAT CANCER: ICD-10-CM

## 2024-03-13 PROCEDURE — 3044F HG A1C LEVEL LT 7.0%: CPT | Performed by: INTERNAL MEDICINE

## 2024-03-13 PROCEDURE — 3074F SYST BP LT 130 MM HG: CPT | Performed by: INTERNAL MEDICINE

## 2024-03-13 PROCEDURE — 3079F DIAST BP 80-89 MM HG: CPT | Performed by: INTERNAL MEDICINE
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
## 2024-03-18 ENCOUNTER — TELEPHONE (OUTPATIENT)
Dept: INTERNAL MEDICINE | Facility: CLINIC | Age: 64
End: 2024-03-18
Payer: MEDICAID

## 2024-03-18 NOTE — TELEPHONE ENCOUNTER
"  Caller: Félix Brian \"Ramesh\"    Relationship: Self    Best call back number: [phone]     What is the best time to reach you: ANYTIME    Who are you requesting to speak with (clinical staff, provider,  specific staff member): JIMENA    What was the call regarding: MEDICATION, PATIENT STATED THAT HE MAY HAVE TO MOVE TO ANOTHER AREA OF THE HOUSE FOR BETTER RECEPTION SO BEAR WITH HIM BEFORE DISCONNECTING THE CALL.      "

## 2024-03-19 ENCOUNTER — TELEMEDICINE (OUTPATIENT)
Dept: PSYCHIATRY | Facility: CLINIC | Age: 64
End: 2024-03-19
Payer: MEDICAID

## 2024-03-19 DIAGNOSIS — F33.2 SEVERE EPISODE OF RECURRENT MAJOR DEPRESSIVE DISORDER, WITHOUT PSYCHOTIC FEATURES: Primary | ICD-10-CM

## 2024-03-19 DIAGNOSIS — F43.10 POST TRAUMATIC STRESS DISORDER (PTSD): ICD-10-CM

## 2024-03-19 DIAGNOSIS — F41.1 GENERALIZED ANXIETY DISORDER: ICD-10-CM

## 2024-03-19 NOTE — PROGRESS NOTES
Date: March 19, 2024  Time In: 10:00AM  Time Out: 11:00AM  This provider is located at [place], [place] for [facility] (through [facility]), [address], [place] [address] using a secure [employer] Video Visit through [employer]. Patient is being seen remotely via telehealth at home address in [place] and stated they are in a secure environment for this session. The patient's condition being diagnosed/treated is appropriate for telemedicine. The provider identified herself as well as her credentials. The patient, and/or patients guardian, consent to be seen remotely, and when consent is given they understand that the consent allows for patient identifiable information to be sent to a third party as needed. They may refuse to be seen remotely at any time. The electronic data is encrypted and password protected, and the patient and/or guardian has been advised of the potential risks to privacy not withstanding such measures.     You have chosen to receive care through a telehealth visit.  Do you consent to use a video/audio connection for your medical care today? Yes    PROGRESS NOTE  Data:  Félix Brian is a 63 y.o. male who presents today for follow up    Chief Complaint: Depression, Anxiety with Panic Attacks    History of Present Illness: Patient reports fluctuating anxiety and depression through the week. Patient stress when having trouble breathing due to previous throat cancer which causes panic. Patient reports having difficulties with previous provider and finding a new provider who was able to provide valuable information on condition. Patient reports recent medication changes have helped reduce symptoms of anxiety and depression this week. Patient reports feeling lonely and only having one visitor this year. Patient reports experiencing financial stress and limited resources but finally getting social security benefits this month after turning 64.  Patient reports having headaches and having difficulty breathing when feeling stressed or overwhelmed. Patient reports family discord following passing of relatives and divisions in [facility]. Patient reports loss and grief leading into he and his wife's wedding anniversary and the passing of her and his mother. Patient reports planning on putting ashes on the farm near his property. 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 Patient identified goals of increasing stress tolerance skills and getting more restful sleep at night.  Patient reports recent medications appear to be working to reduce symptoms and stabilize mood. Patient is working towards short term goal setting, engaging in self awareness exercises, engaging in prosocial activities, and finding ways to increase exercise away from the home.     Clinical Maneuvering/Intervention: CBT, MI, Patient Centered    (Scales based on 0 - 10 with 10 being the worst)  Depression: 6 Anxiety: 7       Assisted patient in processing above session content; acknowledged and normalized patient’s thoughts, feelings, and concerns.  Rationalized patient thought process regarding recent stressors and life events. Discussed triggers associated with patient's emotions. Utilized CBT to process through and correct irrational cognitions with emphasis on importance of setting personal boundaries with self and others. Also discussed coping skills for patient to implement. Discussed recent medication adjustment with positive results and interaction with primary provider.  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 Discussed continued work towards utilizing coping skills, engaging in circular breathing and grounding techniques when feeling stressed or overwhelmed, engaging in positive thinking strategies, journaling thoughts and emotions,  engaging in prosocial activities and finding ways to increase self care practices.     Allowed patient to freely discuss issues without interruption or judgment. Provided safe, confidential environment to facilitate the development of positive therapeutic relationship and encourage open, honest communication. Assisted patient in identifying risk factors which would indicate the need for higher level of care including thoughts to harm self or others and/or self-harming behavior and encouraged patient to contact this office, call 911, or present to the nearest emergency room should any of these events occur. Discussed crisis intervention services and means to access. Patient adamantly and convincingly denies current suicidal or homicidal ideation or perceptual disturbance.    Assessment:   Assessment   Patient appears to maintain relative stability as compared to their baseline.  However, patient continues to struggle with depression, anxiety, trauma which continues to cause impairment in important areas of functioning.  A result, they can be reasonably expected to continue to benefit from treatment and would likely be at increased risk for decompensation otherwise.    Mental Status Exam:   Hygiene:   good  Cooperation:  Cooperative  Eye Contact:  Good  Psychomotor Behavior:  Appropriate  Affect:  Full range  Mood: fluctates  Speech:  Normal  Thought Process:  Linear  Thought Content:  Mood congruent  Suicidal:  None  Homicidal:  None  Hallucinations:  None  Delusion:  None  Memory:  Intact  Orientation:  Person, Place, Time and Situation  Reliability:  fair  Insight:  Fair  Judgement:  Fair  Impulse Control:  Fair  Physical/Medical Issues:  No        Patient's Support Network Includes:  extended family    Functional Status:  Moderate  impairment     Progress toward goal: Patient is working towards practicing the ABC's of CBT model while at home to process through and correct or improve cognitions. Patient is making  progress on processing thoughts and emotions during sessions, journaling, utilizing positive thinking strategies, engaging in breathing and grounding skills, daily positive affirmations and self care practices.     Prognosis: Good with Ongoing Treatment            Plan:    Patient will continue in individual outpatient therapy with focus on improved functioning and coping skills, maintaining stability, and avoiding decompensation and the need for higher level of care.    Patient will adhere to medication regimen as prescribed and report any side effects. Patient will contact this office, call 911 or present to the nearest emergency room should suicidal or homicidal ideations occur. Provide Cognitive Behavioral Therapy and Solution Focused Therapy to improve functioning, maintain stability, and avoid decompensation and the need for higher level of care.     Return in about 4 weeks, or earlier if symptoms worsen or fail to improve.           VISIT DIAGNOSIS:     ICD-10-CM ICD-9-CM   1. Severe episode of recurrent major depressive disorder, without psychotic features  F33.2 296.33   2. Generalized anxiety disorder  F41.1 300.02   3. Post traumatic stress disorder (PTSD)  F43.10 309.81        Diagnoses and all orders for this visit:    1. Severe episode of recurrent major depressive disorder, without psychotic features (Primary)    2. Generalized anxiety disorder    3. Post traumatic stress disorder (PTSD)           [facility] No Show Policy:  We understand unexpected circumstances arise; however, anytime you miss your appointment we are unable to provide you appropriate care.  In addition, each appointment missed could have been used to provide care for others.  We ask that you call at least 24 hours in advance to cancel or reschedule an appointment.  We would like to take this opportunity to remind you of our policy stating patients who miss THREE or more appointments without cancelling or  rescheduling 24 hours in advance of the appointment may be subject to cancellation of any further visits with our clinic and recommendation to seek in-person services/visits.    Please call [phone] to reschedule your appointment. If there are reasons that make it difficult for you to keep the appointments, please call and let us know how we can help.  Please understand that medication prescribing will not continue without seeing your provider.      [facility]'s No Show Policy reviewed with patient at today's visit. Patient verbalized understanding of this policy. Discussed with patient that in the event that there are three or more no show visits, it will be recommended that they pursue in-person services/visits as noncompliance with telehealth visits indicates that patient is not an appropriate candidate for telemedicine and would likely be more appropriate for in-person services/visits. Patient verbalizes understanding and is agreeable to this.       This document has been electronically signed by Fadi Mcintyre III, LCSW  March 19, 2024 13:22 EDT      Part of this note may be an electronic transcription/translation of spoken language to printed text using the Dragon Dictation System.

## 2024-03-19 NOTE — TELEPHONE ENCOUNTER
Called patient back. He just wanted to update me on how his medications are working (very well) and to thank me for taking care of him.

## 2024-04-04 ENCOUNTER — OUTSIDE FACILITY SERVICE (OUTPATIENT)
Dept: GASTROENTEROLOGY | Facility: CLINIC | Age: 64
End: 2024-04-04
Payer: MEDICAID

## 2024-04-04 PROCEDURE — 43239 EGD BIOPSY SINGLE/MULTIPLE: CPT | Performed by: INTERNAL MEDICINE

## 2024-04-23 ENCOUNTER — TELEMEDICINE (OUTPATIENT)
Dept: PSYCHIATRY | Facility: CLINIC | Age: 64
End: 2024-04-23
Payer: MEDICAID

## 2024-04-23 DIAGNOSIS — F41.1 GENERALIZED ANXIETY DISORDER: ICD-10-CM

## 2024-04-23 DIAGNOSIS — F33.2 SEVERE EPISODE OF RECURRENT MAJOR DEPRESSIVE DISORDER, WITHOUT PSYCHOTIC FEATURES: Primary | ICD-10-CM

## 2024-04-23 DIAGNOSIS — F43.10 POST TRAUMATIC STRESS DISORDER (PTSD): ICD-10-CM

## 2024-04-23 PROCEDURE — 90837 PSYTX W PT 60 MINUTES: CPT | Performed by: COUNSELOR

## 2024-04-23 NOTE — PROGRESS NOTES
Date: April 23, 2024  Time In: 11:00AM  Time Out: 12:00PM  This provider is located at [place], [place] for [facility] (through [facility]), [address], [place] [address] using a secure [employer] Video Visit through [employer]. Patient is being seen remotely via telehealth at home address in [place] and stated they are in a secure environment for this session. The patient's condition being diagnosed/treated is appropriate for telemedicine. The provider identified himself as well as his credentials.. The patient, and/or patients guardian, consent to be seen remotely, and when consent is given they understand that the consent allows for patient identifiable information to be sent to a third party as needed. They may refuse to be seen remotely at any time. The electronic data is encrypted and password protected, and the patient and/or guardian has been advised of the potential risks to privacy not withstanding such measures.     You have chosen to receive care through a telehealth visit.  Do you consent to use a video/audio connection for your medical care today? Yes    PROGRESS NOTE  Data:  Félix Brian is a 63 y.o. male who presents today for follow up    Chief Complaint: Depression, Anxiety, PTSD    History of Present Illness: Patient reports fluctuating anxiety and depression through the week. Patient reports stress due to his 22 year old dog has been ill and he has been sleeping on the floor with her to tend to her needs. Patient reports struggling with isolation with few visitors recently. Patient reports conflict with siblings over the estate proceedings following his mothers passing. Patient reports discord with half sister who has changed terms and is not separate assets appropriately. Patient reports struggling with low energy and stayed in bed most the day besides feeding the horses yesterday. 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 Patient reports having difficulties in the morning while experiencing limited energy which has increased depressed mood. Patient identified need to change morning routine to improve thinking and behavior. Patient reports financial stress due to gas prices while living on a fixed income. Patient reports current medications appear to be working to reduce symptoms and stabilize mood. Patient is working towards short term goal setting, engaging in self awareness exercises, engaging in prosocial activities outside of the home, and finding ways to increase daily exercise.     Clinical Maneuvering/Intervention: CBT, MI, Patient Centered    (Scales based on 0 - 10 with 10 being the worst)  Depression: 7 Anxiety: 8       Assisted patient in processing above session content; acknowledged and normalized patient’s thoughts, feelings, and concerns.  Rationalized patient thought process regarding recent stressors and life events. Discussed triggers associated with patient's emotions. Utilized CBT to process through and correct irrational cognitions with emphasis on negative thinking patterns verse positivity thinking strategies as well as loci of control. Also discussed coping skills for patient to implement. Discussed difficult mornings with low energy and discord with siblings over mothers estate. Processed through thoughts and emotions surrounding loss and grief, losing pets, family dysfunction, and sisters declining health. Discussed continued work towards utilizing coping skills, engaging in circular breathing and grounding techniques when feeling stressed or overwhelmed, engaging in positive thinking strategies, journaling thoughts and emotions, making changes to daily routine and finding ways to increase self care practices.     Allowed patient to freely discuss issues without interruption or judgment. Provided safe, confidential environment to facilitate the development of positive  therapeutic relationship and encourage open, honest communication. Assisted patient in identifying risk factors which would indicate the need for higher level of care including thoughts to harm self or others and/or self-harming behavior and encouraged patient to contact this office, call 911, or present to the nearest emergency room should any of these events occur. Discussed crisis intervention services and means to access. Patient adamantly and convincingly denies current suicidal or homicidal ideation or perceptual disturbance.    Assessment:   Assessment   Patient appears to maintain relative stability as compared to their baseline.  However, patient continues to struggle with depression, anxiety, PTSD which continues to cause impairment in important areas of functioning.  A result, they can be reasonably expected to continue to benefit from treatment and would likely be at increased risk for decompensation otherwise.    Mental Status Exam:   Hygiene:   good  Cooperation:  Cooperative  Eye Contact:  Good  Psychomotor Behavior:  Appropriate  Affect:  Full range  Mood: fluctates  Speech:  Normal  Thought Process:  Linear  Thought Content:  Mood congruent  Suicidal:  None  Homicidal:  None  Hallucinations:  None  Delusion:  None  Memory:  Intact  Orientation:  Person, Place, Time and Situation  Reliability:  fair  Insight:  Fair  Judgement:  Fair  Impulse Control:  Fair  Physical/Medical Issues:  No        Patient's Support Network Includes:  extended family    Functional Status:  moderate to severe  impairment     Progress toward goal: Patient is working towards practicing the ABC's of CBT model while at home to process through and correct or improve cognitions. Patient is making progress on processing thoughts and emotions during sessions, journaling, utilizing positive thinking strategies, engaging in breathing and grounding skills, boundary setting, and self care practices.     Prognosis: Good with Ongoing  Treatment            Plan:    Patient will continue in individual outpatient therapy with focus on improved functioning and coping skills, maintaining stability, and avoiding decompensation and the need for higher level of care.    Patient will adhere to medication regimen as prescribed and report any side effects. Patient will contact this office, call 911 or present to the nearest emergency room should suicidal or homicidal ideations occur. Provide Cognitive Behavioral Therapy and Solution Focused Therapy to improve functioning, maintain stability, and avoid decompensation and the need for higher level of care.     Return in about 4 weeks, or earlier if symptoms worsen or fail to improve.           VISIT DIAGNOSIS:     ICD-10-CM ICD-9-CM   1. Severe episode of recurrent major depressive disorder, without psychotic features  F33.2 296.33   2. Generalized anxiety disorder  F41.1 300.02   3. Post traumatic stress disorder (PTSD)  F43.10 309.81        Diagnoses and all orders for this visit:    1. Severe episode of recurrent major depressive disorder, without psychotic features (Primary)    2. Generalized anxiety disorder    3. Post traumatic stress disorder (PTSD)           [facility] No Show Policy:  We understand unexpected circumstances arise; however, anytime you miss your appointment we are unable to provide you appropriate care.  In addition, each appointment missed could have been used to provide care for others.  We ask that you call at least 24 hours in advance to cancel or reschedule an appointment.  We would like to take this opportunity to remind you of our policy stating patients who miss THREE or more appointments without cancelling or rescheduling 24 hours in advance of the appointment may be subject to cancellation of any further visits with our clinic and recommendation to seek in-person services/visits.    Please call [phone] to reschedule your appointment. If there are  reasons that make it difficult for you to keep the appointments, please call and let us know how we can help.  Please understand that medication prescribing will not continue without seeing your provider.      [facility]'s No Show Policy reviewed with patient at today's visit. Patient verbalized understanding of this policy. Discussed with patient that in the event that there are three or more no show visits, it will be recommended that they pursue in-person services/visits as noncompliance with telehealth visits indicates that patient is not an appropriate candidate for telemedicine and would likely be more appropriate for in-person services/visits. Patient verbalizes understanding and is agreeable to this.       This document has been electronically signed by Fadi Mcintyre III, LCSW  April 23, 2024 11:50 EDT      Part of this note may be an electronic transcription/translation of spoken language to printed text using the Dragon Dictation System.

## 2024-05-24 RX ORDER — SODIUM, POTASSIUM,MAG SULFATES 17.5-3.13G
2 SOLUTION, RECONSTITUTED, ORAL ORAL TAKE AS DIRECTED
Qty: 354 ML | Refills: 0 | Status: SHIPPED | OUTPATIENT
Start: 2024-05-24

## 2024-05-29 DIAGNOSIS — F06.4 ANXIETY DISORDER DUE TO GENERAL MEDICAL CONDITION WITH PANIC ATTACK: ICD-10-CM

## 2024-05-29 DIAGNOSIS — F41.0 ANXIETY DISORDER DUE TO GENERAL MEDICAL CONDITION WITH PANIC ATTACK: ICD-10-CM

## 2024-05-29 RX ORDER — HYDROXYZINE 50 MG/1
TABLET, FILM COATED ORAL
Qty: 120 TABLET | Refills: 1 | Status: SHIPPED | OUTPATIENT
Start: 2024-05-29

## 2024-05-29 RX ORDER — CLONAZEPAM 1 MG/1
1 TABLET ORAL 2 TIMES DAILY PRN
Qty: 60 TABLET | Refills: 1 | Status: SHIPPED | OUTPATIENT
Start: 2024-05-29

## 2024-05-29 NOTE — TELEPHONE ENCOUNTER
I will refill the medications today, but I would like to schedule a follow up appointment to check in.

## 2024-06-04 ENCOUNTER — TELEMEDICINE (OUTPATIENT)
Dept: PSYCHIATRY | Facility: CLINIC | Age: 64
End: 2024-06-04
Payer: MEDICAID

## 2024-06-04 DIAGNOSIS — F41.1 GENERALIZED ANXIETY DISORDER: ICD-10-CM

## 2024-06-04 DIAGNOSIS — F43.10 POST TRAUMATIC STRESS DISORDER (PTSD): ICD-10-CM

## 2024-06-04 DIAGNOSIS — F33.2 SEVERE EPISODE OF RECURRENT MAJOR DEPRESSIVE DISORDER, WITHOUT PSYCHOTIC FEATURES: Primary | ICD-10-CM

## 2024-06-04 PROCEDURE — 90837 PSYTX W PT 60 MINUTES: CPT | Performed by: COUNSELOR

## 2024-06-04 NOTE — PROGRESS NOTES
Date: June 4, 2024  Time In: 11:00AM  Time Out: 12:00PM  This provider is located at [place], [place] for [facility] (through [facility]), [address], [place] [address] using a secure [employer] Video Visit through [employer]. Patient is being seen remotely via telehealth at home address in [place] and stated they are in a secure environment for this session. The patient's condition being diagnosed/treated is appropriate for telemedicine. The provider identified himself as well as his credentials.. The patient, and/or patients guardian, consent to be seen remotely, and when consent is given they understand that the consent allows for patient identifiable information to be sent to a third party as needed. They may refuse to be seen remotely at any time. The electronic data is encrypted and password protected, and the patient and/or guardian has been advised of the potential risks to privacy not withstanding such measures.     You have chosen to receive care through a telehealth visit.  Do you consent to use a video/audio connection for your medical care today? Yes    PROGRESS NOTE  Data:  Félix Brian is a 64 y.o. male who presents today for follow up    Chief Complaint: Depression, Anxiety, PTSD    History of Present Illness: Patient reports fluctuating anxiety and depression through the week. Patient reports sadness and grief due to losing his dog that he had for over 20 years. Patient reports experiencing difficulty regulating emotions and frustration surrounding families handling of fathers estate. Patient reports stress due to half sister selling property and not discussing matters with siblings. Patient reports some fear surrounding upcoming medical procedure and annual checkup. Patient reports difficulty with concentration recently and experiencing excessive thinking at night which is impacting sleep. 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 Patient reports current medications appear to be working to reduce symptoms and stabilize mood. Patient is working towards short term goal setting, engaging in self awareness exercises, engaging in prosocial activities, and finding ways to increase exercise through the week.     Clinical Maneuvering/Intervention: CBT, MI, Patient Centered    (Scales based on 0 - 10 with 10 being the worst)  Depression: 7 Anxiety: 7       Assisted patient in processing above session content; acknowledged and normalized patient’s thoughts, feelings, and concerns.  Rationalized patient thought process regarding recent stressors and life events. Discussed triggers associated with patient's emotions. Utilized CBT to process through and correct irrational cognitions with emphasis on negative thought patterns and changing thinking processes. Also discussed coping skills for patient to implement. Discussed recent loss and grief of lifelong pet and stress due to family discord following fathers passing. Discussed continued work towards utilizing coping skills, engaging in circular breathing and grounding techniques when feeling stressed or overwhelmed, engaging in positive thinking strategies, journaling thoughts and emotions, and finding ways to increase self care practices.     Allowed patient to freely discuss issues without interruption or judgment. Provided safe, confidential environment to facilitate the development of positive therapeutic relationship and encourage open, honest communication. Assisted patient in identifying risk factors which would indicate the need for higher level of care including thoughts to harm self or others and/or self-harming behavior and encouraged patient to contact this office, call 911, or present to the nearest emergency room should any of these events occur. Discussed crisis intervention services and means to access. Patient adamantly and  convincingly denies current suicidal or homicidal ideation or perceptual disturbance.    Assessment:   Assessment   Patient appears to maintain relative stability as compared to their baseline.  However, patient continues to struggle with depression, anxiety, PTSD which continues to cause impairment in important areas of functioning.  A result, they can be reasonably expected to continue to benefit from treatment and would likely be at increased risk for decompensation otherwise.    Mental Status Exam:   Hygiene:   good  Cooperation:  Cooperative  Eye Contact:  Good  Psychomotor Behavior:  Appropriate  Affect:  Appropriate  Mood: fluctates  Speech:  Normal  Thought Process:  Linear  Thought Content:  Mood congruent  Suicidal:  None  Homicidal:  None  Hallucinations:  None  Delusion:  None  Memory:  Intact  Orientation:  Person, Place, Time and Situation  Reliability:  fair  Insight:  Fair  Judgement:  Fair  Impulse Control:  Fair  Physical/Medical Issues:  No        Patient's Support Network Includes:  extended family    Functional Status:  Moderate to severe  impairment     Progress toward goal: Patient is working towards practicing the ABC's of CBT model while at home to process through and correct or improve cognitions. Patient is making progress on processing thoughts and emotions during sessions, journaling, utilizing positive thinking strategies, engaging in breathing and grounding skills, daily positive affirmations and self care practices.     Prognosis: Good with Ongoing Treatment            Plan:    Patient will continue in individual outpatient therapy with focus on improved functioning and coping skills, maintaining stability, and avoiding decompensation and the need for higher level of care.    Patient will adhere to medication regimen as prescribed and report any side effects. Patient will contact this office, call 911 or present to the nearest emergency room should suicidal or homicidal ideations  occur. Provide Cognitive Behavioral Therapy and Solution Focused Therapy to improve functioning, maintain stability, and avoid decompensation and the need for higher level of care.     Return in about 5 weeks, or earlier if symptoms worsen or fail to improve.           VISIT DIAGNOSIS:     ICD-10-CM ICD-9-CM   1. Severe episode of recurrent major depressive disorder, without psychotic features  F33.2 296.33   2. Post traumatic stress disorder (PTSD)  F43.10 309.81   3. Generalized anxiety disorder  F41.1 300.02        Diagnoses and all orders for this visit:    1. Severe episode of recurrent major depressive disorder, without psychotic features (Primary)    2. Post traumatic stress disorder (PTSD)    3. Generalized anxiety disorder           [facility] No Show Policy:  We understand unexpected circumstances arise; however, anytime you miss your appointment we are unable to provide you appropriate care.  In addition, each appointment missed could have been used to provide care for others.  We ask that you call at least 24 hours in advance to cancel or reschedule an appointment.  We would like to take this opportunity to remind you of our policy stating patients who miss THREE or more appointments without cancelling or rescheduling 24 hours in advance of the appointment may be subject to cancellation of any further visits with our clinic and recommendation to seek in-person services/visits.    Please call [phone] to reschedule your appointment. If there are reasons that make it difficult for you to keep the appointments, please call and let us know how we can help.  Please understand that medication prescribing will not continue without seeing your provider.      [facility]'s No Show Policy reviewed with patient at today's visit. Patient verbalized understanding of this policy. Discussed with patient that in the event that there are three or more no show visits, it  will be recommended that they pursue in-person services/visits as noncompliance with telehealth visits indicates that patient is not an appropriate candidate for telemedicine and would likely be more appropriate for in-person services/visits. Patient verbalizes understanding and is agreeable to this.       This document has been electronically signed by Fadi Mcintyre III, LCSW  June 4, 2024 11:04 EDT      Part of this note may be an electronic transcription/translation of spoken language to printed text using the Dragon Dictation System.

## 2024-06-05 ENCOUNTER — TELEPHONE (OUTPATIENT)
Dept: GASTROENTEROLOGY | Facility: CLINIC | Age: 64
End: 2024-06-05
Payer: MEDICAID

## 2024-07-01 RX ORDER — POLYETHYLENE GLYCOL-3350 AND ELECTROLYTES 236; 6.74; 5.86; 2.97; 22.74 G/274.31G; G/274.31G; G/274.31G; G/274.31G; G/274.31G
POWDER, FOR SOLUTION ORAL
Qty: 4000 ML | Refills: 0 | Status: SHIPPED | OUTPATIENT
Start: 2024-07-01

## 2024-07-22 DIAGNOSIS — F41.0 ANXIETY DISORDER DUE TO GENERAL MEDICAL CONDITION WITH PANIC ATTACK: ICD-10-CM

## 2024-07-22 DIAGNOSIS — F06.4 ANXIETY DISORDER DUE TO GENERAL MEDICAL CONDITION WITH PANIC ATTACK: ICD-10-CM

## 2024-07-22 RX ORDER — HYDROXYZINE 50 MG/1
TABLET, FILM COATED ORAL
Qty: 120 TABLET | Refills: 1 | Status: SHIPPED | OUTPATIENT
Start: 2024-07-22

## 2024-07-25 DIAGNOSIS — F41.0 ANXIETY DISORDER DUE TO GENERAL MEDICAL CONDITION WITH PANIC ATTACK: ICD-10-CM

## 2024-07-25 DIAGNOSIS — F06.4 ANXIETY DISORDER DUE TO GENERAL MEDICAL CONDITION WITH PANIC ATTACK: ICD-10-CM

## 2024-07-25 RX ORDER — CLONAZEPAM 1 MG/1
1 TABLET ORAL 2 TIMES DAILY PRN
Qty: 60 TABLET | Refills: 1 | Status: SHIPPED | OUTPATIENT
Start: 2024-07-25

## 2024-08-08 ENCOUNTER — TELEMEDICINE (OUTPATIENT)
Dept: PSYCHIATRY | Facility: CLINIC | Age: 64
End: 2024-08-08
Payer: MEDICAID

## 2024-08-08 DIAGNOSIS — F33.2 SEVERE EPISODE OF RECURRENT MAJOR DEPRESSIVE DISORDER, WITHOUT PSYCHOTIC FEATURES: ICD-10-CM

## 2024-08-08 DIAGNOSIS — F43.10 POST TRAUMATIC STRESS DISORDER (PTSD): ICD-10-CM

## 2024-08-08 DIAGNOSIS — F41.1 GENERALIZED ANXIETY DISORDER: Primary | ICD-10-CM

## 2024-08-08 NOTE — TREATMENT PLAN
Multi-Disciplinary Problems (from Behavioral Health Treatment Plan)      Active Problems       Problem: Anxiety  Start Date: 08/08/24      Problem Details: The patient self-scales this problem as a 10 with 10 being the worst.          Goal Priority Start Date Expected End Date End Date    Patient will develop and implement behavioral and cognitive strategies to reduce anxiety and irrational fears. -- 08/08/24 02/06/25 --    Goal Details: Progress toward goal:  The patient self-scales their progress related to this goal as a 8 with 10 being the worst.          Goal Intervention Frequency Start Date End Date    Help patient explore past emotional issues in relation to present anxiety. PRN 08/08/24 --    Intervention Details: Duration of treatment until remission of symptoms.          Goal Intervention Frequency Start Date End Date    Help patient develop an awareness of their cognitive and physical responses to anxiety. PRN 08/08/24 --    Intervention Details: Duration of treatment until remission of symptoms.                  Problem: Depression  Start Date: 08/08/24      Problem Details: The patient self-scales this problem as a 9 with 10 being the worst.          Goal Priority Start Date Expected End Date End Date    Patient will demonstrate the ability to initiate new constructive life skills outside of sessions on a consistent basis. -- 08/08/24 02/06/25 --    Goal Details: Progress toward goal:  The patient self-scales their progress related to this goal as a 8 with 10 being the worst.          Goal Intervention Frequency Start Date End Date    Assist patient in setting attainable activities of daily living goals. PRN 08/08/24 --    Intervention Details: Patient will identify triggers to symptoms, limit isolation by increasing social activities, medication adherence, monitoring sleep, and increase daily exercise.         Goal Intervention Frequency Start Date End Date    Provide education about depression PRN  08/08/24 --    Intervention Details: Duration of treatment until remission of symptoms.          Goal Intervention Frequency Start Date End Date    Assist patient in developing healthy coping strategies. PRN 08/08/24 --    Intervention Details: Duration of treatment until remission of symptoms.                  Problem: Post Traumatic Stress  Start Date: 08/08/24      Problem Details: The patient self-scales this problem as a 10 with 10 being the worst.          Goal Priority Start Date Expected End Date End Date    Patient will process and move through trauma in a way that improves self regard and the patients ability to function optimally in the world around them. -- 08/08/24 02/06/25 --    Goal Details: Progress toward goal:  The patient self-scales their progress related to this goal as a 8 with 10 being the worst.          Goal Intervention Frequency Start Date End Date    Assist patient in identifying ways that trauma has negatively impacted their view of themselves and the world. PRN 08/08/24 --    Intervention Details: Duration of treatment until remission of symptoms.          Goal Intervention Frequency Start Date End Date    Process trauma in the context of the safe session environment. PRN 08/08/24 --    Intervention Details: Duration of treatment until discharged.          Goal Intervention Frequency Start Date End Date    Develop a plan of behavior changes that will reduce the stress of the trauma. PRN 08/08/24 --    Intervention Details: Duration of treatment until remission of symptoms.                                 I have discussed and reviewed this treatment plan with the patient.  It has been printed for signatures.
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
## 2024-08-08 NOTE — PLAN OF CARE
Patient will develop healthy coping strategies, improving overall health, finding ways to increase exercise, decreasing isolation by increasing social activities, identify and create short term goals, utilize grounding and breathing techniques and increase self care practices.

## 2024-09-23 DIAGNOSIS — F41.0 ANXIETY DISORDER DUE TO GENERAL MEDICAL CONDITION WITH PANIC ATTACK: ICD-10-CM

## 2024-09-23 DIAGNOSIS — F06.4 ANXIETY DISORDER DUE TO GENERAL MEDICAL CONDITION WITH PANIC ATTACK: ICD-10-CM

## 2024-09-23 RX ORDER — HYDROXYZINE HYDROCHLORIDE 50 MG/1
TABLET, FILM COATED ORAL
Qty: 120 TABLET | Refills: 1 | Status: SHIPPED | OUTPATIENT
Start: 2024-09-23

## 2024-09-23 RX ORDER — CLONAZEPAM 1 MG/1
1 TABLET ORAL 2 TIMES DAILY PRN
Qty: 60 TABLET | Refills: 0 | Status: SHIPPED | OUTPATIENT
Start: 2024-09-26

## 2024-09-24 ENCOUNTER — TELEMEDICINE (OUTPATIENT)
Dept: PSYCHIATRY | Facility: CLINIC | Age: 64
End: 2024-09-24
Payer: MEDICAID

## 2024-09-24 DIAGNOSIS — F43.10 POST TRAUMATIC STRESS DISORDER (PTSD): ICD-10-CM

## 2024-09-24 DIAGNOSIS — F41.1 GENERALIZED ANXIETY DISORDER: Primary | ICD-10-CM

## 2024-09-24 DIAGNOSIS — F33.2 SEVERE EPISODE OF RECURRENT MAJOR DEPRESSIVE DISORDER, WITHOUT PSYCHOTIC FEATURES: ICD-10-CM

## 2024-09-24 PROCEDURE — 90834 PSYTX W PT 45 MINUTES: CPT | Performed by: COUNSELOR

## 2024-10-14 ENCOUNTER — OUTSIDE FACILITY SERVICE (OUTPATIENT)
Dept: GASTROENTEROLOGY | Facility: CLINIC | Age: 64
End: 2024-10-14
Payer: MEDICAID

## 2024-10-22 DIAGNOSIS — F41.0 ANXIETY DISORDER DUE TO GENERAL MEDICAL CONDITION WITH PANIC ATTACK: ICD-10-CM

## 2024-10-22 DIAGNOSIS — F06.4 ANXIETY DISORDER DUE TO GENERAL MEDICAL CONDITION WITH PANIC ATTACK: ICD-10-CM

## 2024-10-25 ENCOUNTER — LAB (OUTPATIENT)
Dept: LAB | Facility: HOSPITAL | Age: 64
End: 2024-10-25
Payer: MEDICAID

## 2024-10-25 ENCOUNTER — OFFICE VISIT (OUTPATIENT)
Dept: FAMILY MEDICINE CLINIC | Facility: CLINIC | Age: 64
End: 2024-10-25
Payer: MEDICAID

## 2024-10-25 VITALS
BODY MASS INDEX: 28.55 KG/M2 | DIASTOLIC BLOOD PRESSURE: 82 MMHG | WEIGHT: 215.4 LBS | OXYGEN SATURATION: 97 % | HEART RATE: 65 BPM | HEIGHT: 73 IN | SYSTOLIC BLOOD PRESSURE: 132 MMHG

## 2024-10-25 DIAGNOSIS — N41.9 PROSTATITIS, UNSPECIFIED PROSTATITIS TYPE: ICD-10-CM

## 2024-10-25 DIAGNOSIS — R35.0 BENIGN PROSTATIC HYPERPLASIA WITH URINARY FREQUENCY: ICD-10-CM

## 2024-10-25 DIAGNOSIS — K92.1 BLOOD IN STOOL: ICD-10-CM

## 2024-10-25 DIAGNOSIS — N40.1 BENIGN PROSTATIC HYPERPLASIA WITH URINARY FREQUENCY: ICD-10-CM

## 2024-10-25 DIAGNOSIS — Z12.5 ENCOUNTER FOR PROSTATE CANCER SCREENING: ICD-10-CM

## 2024-10-25 DIAGNOSIS — R35.0 URINARY FREQUENCY: ICD-10-CM

## 2024-10-25 DIAGNOSIS — R35.0 URINARY FREQUENCY: Primary | ICD-10-CM

## 2024-10-25 DIAGNOSIS — K21.9 GASTROESOPHAGEAL REFLUX DISEASE, UNSPECIFIED WHETHER ESOPHAGITIS PRESENT: ICD-10-CM

## 2024-10-25 DIAGNOSIS — R15.9 INCONTINENCE OF FECES, UNSPECIFIED FECAL INCONTINENCE TYPE: ICD-10-CM

## 2024-10-25 LAB
ALBUMIN SERPL-MCNC: 4.4 G/DL (ref 3.5–5.2)
ALBUMIN/GLOB SERPL: 1.5 G/DL
ALP SERPL-CCNC: 75 U/L (ref 39–117)
ALT SERPL W P-5'-P-CCNC: 26 U/L (ref 1–41)
ANION GAP SERPL CALCULATED.3IONS-SCNC: 9.2 MMOL/L (ref 5–15)
AST SERPL-CCNC: 28 U/L (ref 1–40)
BILIRUB BLD-MCNC: NEGATIVE MG/DL
BILIRUB SERPL-MCNC: 0.5 MG/DL (ref 0–1.2)
BUN SERPL-MCNC: 16 MG/DL (ref 8–23)
BUN/CREAT SERPL: 14 (ref 7–25)
CALCIUM SPEC-SCNC: 10 MG/DL (ref 8.6–10.5)
CHLORIDE SERPL-SCNC: 105 MMOL/L (ref 98–107)
CLARITY, POC: CLEAR
CO2 SERPL-SCNC: 27.8 MMOL/L (ref 22–29)
COLOR UR: YELLOW
CREAT SERPL-MCNC: 1.14 MG/DL (ref 0.76–1.27)
EGFRCR SERPLBLD CKD-EPI 2021: 72.3 ML/MIN/1.73
GLOBULIN UR ELPH-MCNC: 2.9 GM/DL
GLUCOSE SERPL-MCNC: 88 MG/DL (ref 65–99)
GLUCOSE UR STRIP-MCNC: NEGATIVE MG/DL
KETONES UR QL: NEGATIVE
LEUKOCYTE EST, POC: NEGATIVE
NITRITE UR-MCNC: NEGATIVE MG/ML
PH UR: 6.5 [PH] (ref 5–8)
POTASSIUM SERPL-SCNC: 4.8 MMOL/L (ref 3.5–5.2)
PROT SERPL-MCNC: 7.3 G/DL (ref 6–8.5)
PROT UR STRIP-MCNC: NEGATIVE MG/DL
PSA SERPL-MCNC: 3.12 NG/ML (ref 0–4)
RBC # UR STRIP: NEGATIVE /UL
SODIUM SERPL-SCNC: 142 MMOL/L (ref 136–145)
SP GR UR: 1 (ref 1–1.03)
UROBILINOGEN UR QL: NORMAL

## 2024-10-25 PROCEDURE — 3079F DIAST BP 80-89 MM HG: CPT | Performed by: INTERNAL MEDICINE

## 2024-10-25 PROCEDURE — 80053 COMPREHEN METABOLIC PANEL: CPT

## 2024-10-25 PROCEDURE — 99417 PROLNG OP E/M EACH 15 MIN: CPT | Performed by: INTERNAL MEDICINE

## 2024-10-25 PROCEDURE — 3044F HG A1C LEVEL LT 7.0%: CPT | Performed by: INTERNAL MEDICINE

## 2024-10-25 PROCEDURE — 1160F RVW MEDS BY RX/DR IN RCRD: CPT | Performed by: INTERNAL MEDICINE

## 2024-10-25 PROCEDURE — G0103 PSA SCREENING: HCPCS

## 2024-10-25 PROCEDURE — 3075F SYST BP GE 130 - 139MM HG: CPT | Performed by: INTERNAL MEDICINE

## 2024-10-25 PROCEDURE — 99215 OFFICE O/P EST HI 40 MIN: CPT | Performed by: INTERNAL MEDICINE

## 2024-10-25 PROCEDURE — 1125F AMNT PAIN NOTED PAIN PRSNT: CPT | Performed by: INTERNAL MEDICINE

## 2024-10-25 PROCEDURE — 1159F MED LIST DOCD IN RCRD: CPT | Performed by: INTERNAL MEDICINE

## 2024-10-25 RX ORDER — CIPROFLOXACIN 500 MG/1
500 TABLET, FILM COATED ORAL 2 TIMES DAILY
Qty: 14 TABLET | Refills: 0 | Status: SHIPPED | OUTPATIENT
Start: 2024-10-25 | End: 2024-11-01

## 2024-10-25 RX ORDER — OMEPRAZOLE 40 MG/1
40 CAPSULE, DELAYED RELEASE ORAL DAILY
Qty: 90 CAPSULE | Refills: 3 | Status: SHIPPED | OUTPATIENT
Start: 2024-10-25

## 2024-10-25 RX ORDER — TAMSULOSIN HYDROCHLORIDE 0.4 MG/1
1 CAPSULE ORAL DAILY
Qty: 30 CAPSULE | Refills: 0 | Status: SHIPPED | OUTPATIENT
Start: 2024-10-25
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
## 2024-10-28 ENCOUNTER — TELEMEDICINE (OUTPATIENT)
Age: 64
End: 2024-10-28
Payer: MEDICAID

## 2024-10-28 DIAGNOSIS — F06.4 ANXIETY DISORDER DUE TO GENERAL MEDICAL CONDITION WITH PANIC ATTACK: ICD-10-CM

## 2024-10-28 DIAGNOSIS — F41.0 PANIC ATTACKS: ICD-10-CM

## 2024-10-28 DIAGNOSIS — F41.1 GENERALIZED ANXIETY DISORDER: Primary | ICD-10-CM

## 2024-10-28 DIAGNOSIS — F41.0 ANXIETY DISORDER DUE TO GENERAL MEDICAL CONDITION WITH PANIC ATTACK: ICD-10-CM

## 2024-10-28 PROCEDURE — 1159F MED LIST DOCD IN RCRD: CPT

## 2024-10-28 PROCEDURE — 99214 OFFICE O/P EST MOD 30 MIN: CPT

## 2024-10-28 PROCEDURE — 1160F RVW MEDS BY RX/DR IN RCRD: CPT

## 2024-10-28 RX ORDER — CLONAZEPAM 1 MG/1
1 TABLET ORAL 2 TIMES DAILY PRN
Qty: 60 TABLET | OUTPATIENT
Start: 2024-10-28

## 2024-10-28 RX ORDER — CLONAZEPAM 1 MG/1
1 TABLET ORAL 2 TIMES DAILY PRN
Qty: 60 TABLET | Refills: 1 | Status: SHIPPED | OUTPATIENT
Start: 2024-10-28

## 2024-10-28 RX ORDER — DESVENLAFAXINE 25 MG/1
25 TABLET, EXTENDED RELEASE ORAL DAILY
Qty: 30 TABLET | Refills: 0 | Status: SHIPPED | OUTPATIENT
Start: 2024-10-28
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
## 2024-10-30 ENCOUNTER — LAB (OUTPATIENT)
Dept: LAB | Facility: HOSPITAL | Age: 64
End: 2024-10-30
Payer: MEDICAID

## 2024-10-30 DIAGNOSIS — R35.0 URINARY FREQUENCY: ICD-10-CM

## 2024-10-30 DIAGNOSIS — N41.9 PROSTATITIS, UNSPECIFIED PROSTATITIS TYPE: ICD-10-CM

## 2024-10-30 DIAGNOSIS — K92.1 BLOOD IN STOOL: ICD-10-CM

## 2024-10-30 PROCEDURE — 85025 COMPLETE CBC W/AUTO DIFF WBC: CPT

## 2024-10-31 LAB
BASOPHILS # BLD AUTO: 0.02 10*3/MM3 (ref 0–0.2)
BASOPHILS NFR BLD AUTO: 0.4 % (ref 0–1.5)
DEPRECATED RDW RBC AUTO: 39.5 FL (ref 37–54)
EOSINOPHIL # BLD AUTO: 0.06 10*3/MM3 (ref 0–0.4)
EOSINOPHIL NFR BLD AUTO: 1.1 % (ref 0.3–6.2)
ERYTHROCYTE [DISTWIDTH] IN BLOOD BY AUTOMATED COUNT: 12.7 % (ref 12.3–15.4)
HCT VFR BLD AUTO: 44.2 % (ref 37.5–51)
HGB BLD-MCNC: 15.3 G/DL (ref 13–17.7)
IMM GRANULOCYTES # BLD AUTO: 0.02 10*3/MM3 (ref 0–0.05)
IMM GRANULOCYTES NFR BLD AUTO: 0.4 % (ref 0–0.5)
LYMPHOCYTES # BLD AUTO: 1.81 10*3/MM3 (ref 0.7–3.1)
LYMPHOCYTES NFR BLD AUTO: 32.1 % (ref 19.6–45.3)
MCH RBC QN AUTO: 29.7 PG (ref 26.6–33)
MCHC RBC AUTO-ENTMCNC: 34.6 G/DL (ref 31.5–35.7)
MCV RBC AUTO: 85.8 FL (ref 79–97)
MONOCYTES # BLD AUTO: 0.34 10*3/MM3 (ref 0.1–0.9)
MONOCYTES NFR BLD AUTO: 6 % (ref 5–12)
NEUTROPHILS NFR BLD AUTO: 3.38 10*3/MM3 (ref 1.7–7)
NEUTROPHILS NFR BLD AUTO: 60 % (ref 42.7–76)
NRBC BLD AUTO-RTO: 0 /100 WBC (ref 0–0.2)
PLATELET # BLD AUTO: 222 10*3/MM3 (ref 140–450)
PMV BLD AUTO: 10.5 FL (ref 6–12)
RBC # BLD AUTO: 5.15 10*6/MM3 (ref 4.14–5.8)
WBC NRBC COR # BLD AUTO: 5.63 10*3/MM3 (ref 3.4–10.8)

## 2024-11-01 ENCOUNTER — PATIENT ROUNDING (BHMG ONLY) (OUTPATIENT)
Dept: FAMILY MEDICINE CLINIC | Facility: CLINIC | Age: 64
End: 2024-11-01
Payer: MEDICAID

## 2024-11-05 ENCOUNTER — OFFICE VISIT (OUTPATIENT)
Dept: FAMILY MEDICINE CLINIC | Facility: CLINIC | Age: 64
End: 2024-11-05
Payer: MEDICAID

## 2024-11-05 VITALS
OXYGEN SATURATION: 97 % | HEART RATE: 68 BPM | WEIGHT: 215.6 LBS | DIASTOLIC BLOOD PRESSURE: 80 MMHG | BODY MASS INDEX: 28.57 KG/M2 | HEIGHT: 73 IN | SYSTOLIC BLOOD PRESSURE: 116 MMHG

## 2024-11-05 DIAGNOSIS — N40.1 BENIGN PROSTATIC HYPERPLASIA WITH URINARY FREQUENCY: Primary | ICD-10-CM

## 2024-11-05 DIAGNOSIS — R35.0 BENIGN PROSTATIC HYPERPLASIA WITH URINARY FREQUENCY: Primary | ICD-10-CM

## 2024-11-05 LAB
BILIRUB BLD-MCNC: NEGATIVE MG/DL
CLARITY, POC: CLEAR
COLOR UR: YELLOW
GLUCOSE UR STRIP-MCNC: NEGATIVE MG/DL
KETONES UR QL: NEGATIVE
LEUKOCYTE EST, POC: NEGATIVE
NITRITE UR-MCNC: NEGATIVE MG/ML
PH UR: 7 [PH] (ref 5–8)
PROT UR STRIP-MCNC: NEGATIVE MG/DL
RBC # UR STRIP: NEGATIVE /UL
SP GR UR: 1 (ref 1–1.03)
UROBILINOGEN UR QL: NORMAL

## 2024-11-05 PROCEDURE — 3079F DIAST BP 80-89 MM HG: CPT | Performed by: INTERNAL MEDICINE

## 2024-11-05 PROCEDURE — 99214 OFFICE O/P EST MOD 30 MIN: CPT | Performed by: INTERNAL MEDICINE

## 2024-11-05 PROCEDURE — 3044F HG A1C LEVEL LT 7.0%: CPT | Performed by: INTERNAL MEDICINE

## 2024-11-05 PROCEDURE — 1160F RVW MEDS BY RX/DR IN RCRD: CPT | Performed by: INTERNAL MEDICINE

## 2024-11-05 PROCEDURE — 1125F AMNT PAIN NOTED PAIN PRSNT: CPT | Performed by: INTERNAL MEDICINE

## 2024-11-05 PROCEDURE — 3074F SYST BP LT 130 MM HG: CPT | Performed by: INTERNAL MEDICINE

## 2024-11-05 PROCEDURE — 81002 URINALYSIS NONAUTO W/O SCOPE: CPT | Performed by: INTERNAL MEDICINE

## 2024-11-05 PROCEDURE — 1159F MED LIST DOCD IN RCRD: CPT | Performed by: INTERNAL MEDICINE
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
## 2024-11-25 DIAGNOSIS — F41.0 ANXIETY DISORDER DUE TO GENERAL MEDICAL CONDITION WITH PANIC ATTACK: ICD-10-CM

## 2024-11-25 DIAGNOSIS — N40.1 BENIGN PROSTATIC HYPERPLASIA WITH URINARY FREQUENCY: ICD-10-CM

## 2024-11-25 DIAGNOSIS — R35.0 BENIGN PROSTATIC HYPERPLASIA WITH URINARY FREQUENCY: ICD-10-CM

## 2024-11-25 DIAGNOSIS — F06.4 ANXIETY DISORDER DUE TO GENERAL MEDICAL CONDITION WITH PANIC ATTACK: ICD-10-CM

## 2024-11-25 DIAGNOSIS — F41.1 GENERALIZED ANXIETY DISORDER: ICD-10-CM

## 2024-11-25 DIAGNOSIS — F41.0 PANIC ATTACKS: ICD-10-CM

## 2024-11-25 RX ORDER — TAMSULOSIN HYDROCHLORIDE 0.4 MG/1
1 CAPSULE ORAL DAILY
Qty: 30 CAPSULE | Refills: 0 | Status: SHIPPED | OUTPATIENT
Start: 2024-11-25

## 2024-11-25 RX ORDER — DESVENLAFAXINE 25 MG/1
25 TABLET, EXTENDED RELEASE ORAL DAILY
Qty: 30 TABLET | Refills: 2 | Status: SHIPPED | OUTPATIENT
Start: 2024-11-25

## 2024-11-25 RX ORDER — HYDROXYZINE HYDROCHLORIDE 50 MG/1
TABLET, FILM COATED ORAL
Qty: 120 TABLET | Refills: 3 | Status: SHIPPED | OUTPATIENT
Start: 2024-11-25

## 2024-11-25 NOTE — TELEPHONE ENCOUNTER
Rx Refill Note  Requested Prescriptions     Pending Prescriptions Disp Refills    tamsulosin (FLOMAX) 0.4 MG capsule 24 hr capsule [Pharmacy Med Name: TAMSULOSIN HCL 0.4 MG CAPSULE] 30 capsule 0     Sig: TAKE 1 CAPSULE BY MOUTH DAILY      Last office visit with prescribing clinician: 11/5/2024   Last telemedicine visit with prescribing clinician: Visit date not found   Next office visit with prescribing clinician: Visit date not found                         Would you like a call back once the refill request has been completed: [] Yes [] No    If the office needs to give you a call back, can they leave a voicemail: [] Yes [] No    Cheryl Macias MA  11/25/24, 13:26 EST

## 2024-12-26 ENCOUNTER — OFFICE VISIT (OUTPATIENT)
Dept: UROLOGY | Facility: CLINIC | Age: 64
End: 2024-12-26
Payer: MEDICAID

## 2024-12-26 VITALS — WEIGHT: 215 LBS | HEIGHT: 73 IN | BODY MASS INDEX: 28.49 KG/M2

## 2024-12-26 DIAGNOSIS — Z87.898 HISTORY OF GROSS HEMATURIA: ICD-10-CM

## 2024-12-26 DIAGNOSIS — N13.8 BPH WITH OBSTRUCTION/LOWER URINARY TRACT SYMPTOMS: Primary | ICD-10-CM

## 2024-12-26 DIAGNOSIS — N40.1 BPH WITH OBSTRUCTION/LOWER URINARY TRACT SYMPTOMS: Primary | ICD-10-CM

## 2024-12-26 DIAGNOSIS — R33.9 INCOMPLETE BLADDER EMPTYING: ICD-10-CM

## 2024-12-26 LAB
BILIRUB BLD-MCNC: NEGATIVE MG/DL
CLARITY, POC: CLEAR
COLOR UR: YELLOW
EXPIRATION DATE: NORMAL
GLUCOSE UR STRIP-MCNC: NEGATIVE MG/DL
KETONES UR QL: NEGATIVE
LEUKOCYTE EST, POC: NEGATIVE
Lab: NORMAL
NITRITE UR-MCNC: NEGATIVE MG/ML
PH UR: 6 [PH] (ref 5–8)
PROT UR STRIP-MCNC: NEGATIVE MG/DL
RBC # UR STRIP: NEGATIVE /UL
SP GR UR: 1.01 (ref 1–1.03)
UROBILINOGEN UR QL: NORMAL
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
## 2024-12-27 DIAGNOSIS — N40.1 BENIGN PROSTATIC HYPERPLASIA WITH URINARY FREQUENCY: ICD-10-CM

## 2024-12-27 DIAGNOSIS — F41.0 ANXIETY DISORDER DUE TO GENERAL MEDICAL CONDITION WITH PANIC ATTACK: ICD-10-CM

## 2024-12-27 DIAGNOSIS — F06.4 ANXIETY DISORDER DUE TO GENERAL MEDICAL CONDITION WITH PANIC ATTACK: ICD-10-CM

## 2024-12-27 DIAGNOSIS — R35.0 BENIGN PROSTATIC HYPERPLASIA WITH URINARY FREQUENCY: ICD-10-CM

## 2024-12-27 DIAGNOSIS — F41.0 PANIC ATTACKS: ICD-10-CM

## 2024-12-27 RX ORDER — TAMSULOSIN HYDROCHLORIDE 0.4 MG/1
1 CAPSULE ORAL DAILY
Qty: 30 CAPSULE | Refills: 0 | Status: SHIPPED | OUTPATIENT
Start: 2024-12-27

## 2024-12-27 NOTE — TELEPHONE ENCOUNTER
Rx Refill Note  Requested Prescriptions     Pending Prescriptions Disp Refills    tamsulosin (FLOMAX) 0.4 MG capsule 24 hr capsule [Pharmacy Med Name: TAMSULOSIN HCL 0.4 MG CAPSULE] 30 capsule 0     Sig: TAKE 1 CAPSULE BY MOUTH DAILY      Last office visit with prescribing clinician: 11/5/2024   Last telemedicine visit with prescribing clinician: Visit date not found   Next office visit with prescribing clinician: Visit date not found                         Would you like a call back once the refill request has been completed: [] Yes [] No    If the office needs to give you a call back, can they leave a voicemail: [] Yes [] No    Cheryl Macias MA  12/27/24, 15:31 EST

## 2024-12-30 RX ORDER — CLONAZEPAM 1 MG/1
1 TABLET ORAL 2 TIMES DAILY PRN
Qty: 60 TABLET | Refills: 0 | Status: SHIPPED | OUTPATIENT
Start: 2024-12-30

## 2025-01-13 ENCOUNTER — PROCEDURE VISIT (OUTPATIENT)
Dept: UROLOGY | Facility: CLINIC | Age: 65
End: 2025-01-13
Payer: MEDICAID

## 2025-01-13 DIAGNOSIS — N40.1 BPH WITH OBSTRUCTION/LOWER URINARY TRACT SYMPTOMS: Primary | ICD-10-CM

## 2025-01-13 DIAGNOSIS — N13.8 BPH WITH OBSTRUCTION/LOWER URINARY TRACT SYMPTOMS: Primary | ICD-10-CM

## 2025-01-13 RX ORDER — FINASTERIDE 5 MG/1
5 TABLET, FILM COATED ORAL DAILY
Qty: 30 TABLET | Refills: 2 | Status: SHIPPED | OUTPATIENT
Start: 2025-01-13
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
## 2025-01-20 ENCOUNTER — HOSPITAL ENCOUNTER (OUTPATIENT)
Dept: CT IMAGING | Facility: HOSPITAL | Age: 65
Discharge: HOME | End: 2025-01-20
Admitting: NURSE PRACTITIONER
Payer: MEDICAID

## 2025-01-20 DIAGNOSIS — Z87.898 HISTORY OF GROSS HEMATURIA: ICD-10-CM

## 2025-01-20 LAB — CREAT BLDA-MCNC: 1.4 MG/DL (ref 0.6–1.3)

## 2025-01-20 PROCEDURE — 25510000001 IOPAMIDOL PER 1 ML: Performed by: NURSE PRACTITIONER

## 2025-01-20 PROCEDURE — 74178 CT ABD&PLV WO CNTR FLWD CNTR: CPT

## 2025-01-20 PROCEDURE — 82565 ASSAY OF CREATININE: CPT

## 2025-01-20 RX ORDER — IOPAMIDOL 755 MG/ML
150 INJECTION, SOLUTION INTRAVASCULAR
Status: COMPLETED | OUTPATIENT
Start: 2025-01-20 | End: 2025-01-20

## 2025-01-20 RX ADMIN — IOPAMIDOL 150 ML: 755 INJECTION, SOLUTION INTRAVENOUS at 16:05

## 2025-01-27 ENCOUNTER — TELEPHONE (OUTPATIENT)
Dept: GASTROENTEROLOGY | Facility: CLINIC | Age: 65
End: 2025-01-27
Payer: MEDICAID

## 2025-01-27 NOTE — TELEPHONE ENCOUNTER
Caller: TIMI FERRER     Relationship to patient: SELF    Best call back number: [phone]    Chief complaint: PT HAD SPELL WHERE HE WAS IN THE BATHROOM, PEEING BLOOD, PASSED OUT FOR 3 HOURS, WENT TO [facility], LIVER WITH LARGE CYST PARTIALLY CALCIFIED, SPLEEN WITH CYSTS, HERNIA AS WELL, IN A LOT OF PAIN, WENT TO UROLOGIST AND HE WANTS TO DO LASER SURGERY ON HIS PROSTATE. ER SAYS TO FOLLOW UP WITH GI.     Type of visit: FOLLOW UP    Requested date:      If rescheduling, when is the original appointment:      Additional notes:

## 2025-01-28 ENCOUNTER — OFFICE VISIT (OUTPATIENT)
Dept: BEHAVIORAL HEALTH | Facility: CLINIC | Age: 65
End: 2025-01-28
Payer: MEDICAID

## 2025-01-28 VITALS
BODY MASS INDEX: 28.89 KG/M2 | SYSTOLIC BLOOD PRESSURE: 118 MMHG | DIASTOLIC BLOOD PRESSURE: 74 MMHG | HEIGHT: 73 IN | WEIGHT: 218 LBS

## 2025-01-28 DIAGNOSIS — F41.0 PANIC ATTACKS: ICD-10-CM

## 2025-01-28 DIAGNOSIS — F41.1 GENERALIZED ANXIETY DISORDER: ICD-10-CM

## 2025-01-28 DIAGNOSIS — F06.4 ANXIETY DISORDER DUE TO GENERAL MEDICAL CONDITION WITH PANIC ATTACK: Primary | ICD-10-CM

## 2025-01-28 DIAGNOSIS — F41.0 ANXIETY DISORDER DUE TO GENERAL MEDICAL CONDITION WITH PANIC ATTACK: Primary | ICD-10-CM

## 2025-01-28 RX ORDER — DESVENLAFAXINE 50 MG/1
50 TABLET, FILM COATED, EXTENDED RELEASE ORAL NIGHTLY
Qty: 30 TABLET | Refills: 2 | Status: SHIPPED | OUTPATIENT
Start: 2025-01-28

## 2025-01-28 RX ORDER — CLONAZEPAM 1 MG/1
1 TABLET ORAL 2 TIMES DAILY PRN
Qty: 60 TABLET | Refills: 1 | Status: SHIPPED | OUTPATIENT
Start: 2025-01-28

## 2025-01-28 NOTE — PROGRESS NOTES
Follow Up Office Visit      Date: 2025   Patient Name: Félix Brian  : 1960   MRN: [id number]     Referring Provider: Nik Bolaños DO    Chief Complaint:  Félix Brian is a 64 y.o. male who is here today for follow up with medication management for anxiety and panic disorder.    History of Present Illness: Patient reports increase stressors, including multiple health issues and the death of his sister.  He reports that he feels more depressed and hopeless lately, and has not been sleeping well.  He continues to take clonazepam daily, and has been consistently taking Pristiq 25 mg at night.  He reports that the lower dose of Pristiq is not as sedating as his previous trial of the higher dose, but given his sleep difficulties lately, he is willing to try the regular dose of Pristiq again.  No SI/HI/psychotic/manic symptoms present, no adverse effects or side effects to current medications noted, and no issues with appetite reported.     Subjective     Review of Systems:   Review of Systems   Psychiatric/Behavioral:  Positive for sleep disturbance, depressed mood and stress. The patient is nervous/anxious.        Screening Scores:   PHQ-9: 20 (last visit, 10)  LEONOR-7: 19 (last visit, 13)    Medications:     Current Outpatient Medications:     clonazePAM (KlonoPIN) 1 MG tablet, Take 1 tablet by mouth 2 (Two) Times a Day As Needed for Anxiety., Disp: 60 tablet, Rfl: 1    Desvenlafaxine Succinate ER (PRISTIQ) 50 MG 24 hr tablet, Take 1 tablet by mouth Every Night., Disp: 30 tablet, Rfl: 2    finasteride (Proscar) 5 MG tablet, Take 1 tablet by mouth Daily., Disp: 30 tablet, Rfl: 2    hydrOXYzine (ATARAX) 50 MG tablet, TAKE 1 TABLET BY MOUTH 4 TIMES A DAY AS NEEDED FOR ANXIETY AND INSOMNIA, Disp: 120 tablet, Rfl: 3    omeprazole (priLOSEC) 40 MG capsule, Take 1 capsule by mouth Daily., Disp: 90 capsule, Rfl: 3    tamsulosin (FLOMAX) 0.4 MG capsule 24 hr capsule,  "TAKE 1 CAPSULE BY MOUTH DAILY, Disp: 30 capsule, Rfl: 0    Allergies:   Allergies   Allergen Reactions    Ativan [Lorazepam] Mental Status Change     Headaches, confusion    Buspar [Buspirone] Mental Status Change       Results Reviewed: n/a     The following portion of the patient's history were reviewed and updated appropriately: allergies, current and past medications, family history, medical history and social history.    Objective     Vital Signs:   Vitals:    01/28/25 1122   BP: 118/74   Weight: 98.9 kg (218 lb)   Height: 185.4 cm (72.99\")     Body mass index is 28.77 kg/m².     Mental Status Exam:   MENTAL STATUS EXAM   General Appearance:  Cleanly groomed and dressed  Eye Contact:  Good eye contact  Attitude:  Cooperative  Motor Activity:  Normal gait, posture and fidgety  Muscle Strength:  Normal  Speech:  Normal rate, tone, volume and rambling  Language:  Spontaneous  Mood and affect:  Normal, pleasant, anxious and depressed  Hopelessness:  2  Loneliness: 2  Thought Process:  Logical, goal-directed and tangential  Associations/ Thought Content:  No delusions  Hallucinations:  None  Suicidal Ideations:  Not present  Homicidal Ideation:  Not present  Sensorium:  Alert and clear  Orientation:  Person, place, time and situation  Immediate Recall, Recent, and Remote Memory:  Deficit noted  Attention Span/ Concentration:  Easily distracted  Fund of Knowledge:  Appropriate for age and educational level  Intellectual Functioning:  Average range  Insight:  Good  Judgement:  Good  Reliability:  Good  Impulse Control:  Good        SUICIDE RISK ASSESSMENT/CSSRS:  1. Does patient have thoughts of suicide? no  2. Does patient have intent for suicide? no  3. Does patient have a current plan for suicide? no  4. History of suicide attempts: no  5. Family history of suicide or attempts: no  6. History of violent behaviors towards others or property or thoughts of committing suicide: no  7. History of sexual aggression " toward others: no  8. Access to firearms or weapons: no    Labs Reviewed: n/a  UDS Reviewed: n/a (patient has prostate issues, cannot provide sample)  Chart since last visit reviewed: yes    Assessment / Plan    Quality Measures:  Tobacco cessation: Patient is a former tobacco user. No tobacco cessation education necessary.     Depression (PHQ >9): Patient screened positive for depression with a PHQ score of 20. Follow up recommendations include medication management, suicide risk assessment, continued screening score monitoring and supportive care.    Medication Considerations:  Benzo: No SI/HI/psychotic/manic symptoms present, no adverse effects or side effects to current medications noted, and no issues with appetite or sleep reported.  Stimulants: n/a   ERICK reviewed and appropriate.     Risk Assessment: Risk of self-harm acutely is low. Risk of self-harm chronically is also low, but could be further elevated in the event of treatment noncompliance and/or AODA.    Visit Diagnosis/Orders Placed This Visit:  Diagnoses and all orders for this visit:    1. Anxiety disorder due to general medical condition with panic attack (Primary)  -     clonazePAM (KlonoPIN) 1 MG tablet; Take 1 tablet by mouth 2 (Two) Times a Day As Needed for Anxiety.  Dispense: 60 tablet; Refill: 1    2. Generalized anxiety disorder  -     Desvenlafaxine Succinate ER (PRISTIQ) 50 MG 24 hr tablet; Take 1 tablet by mouth Every Night.  Dispense: 30 tablet; Refill: 2    3. Panic attacks  -     clonazePAM (KlonoPIN) 1 MG tablet; Take 1 tablet by mouth 2 (Two) Times a Day As Needed for Anxiety.  Dispense: 60 tablet; Refill: 1  -     Desvenlafaxine Succinate ER (PRISTIQ) 50 MG 24 hr tablet; Take 1 tablet by mouth Every Night.  Dispense: 30 tablet; Refill: 2         Impression/Formulation:  Patient appeared alert and oriented.  Patient is voluntarily continuing to receive psychiatric care at [facility].   Patient is receptive  to assistance with maintaining a stable lifestyle.  Patient presents with history of     ICD-10-CM ICD-9-CM   1. Anxiety disorder due to general medical condition with panic attack  F06.4 293.84    F41.0 300.01   2. Generalized anxiety disorder  F41.1 300.02   3. Panic attacks  F41.0 300.01   .     Treatment Plan:   Increase Pristiq to 50 mg nightly. Continue Klonopin 1 mg twice daily as needed for panic. Continue Hydroxyzine 50 mg up to 4 times daily as needed for anxiety. Follow up in 8 weeks.     [facility] in [place].     Patient will continue supportive psychotherapy efforts and medications as indicated.  Discussed medication options and treatment plan of prescribed medication(s) as well as the risks, benefits, and potential side effects. Patient will contact this office, call 911 or present to the nearest emergency room should suicidal or homicidal ideations occur. Clinic will obtain release of information for current treatment team for continuity of care as needed. Patient ackowledged and verbally consented to continue with current treatment plan and was educated on the importance of compliance with treatment and follow-up appointments.     Follow Up:   Return in about 8 weeks (around 3/25/2025) for Medication Management.        MARIANNA Butler  [facility]    This is electronically signed by MARIANNA Butler  01/28/2025 11:40 EST

## 2025-01-29 DIAGNOSIS — N40.1 BENIGN PROSTATIC HYPERPLASIA WITH URINARY FREQUENCY: ICD-10-CM

## 2025-01-29 DIAGNOSIS — R35.0 BENIGN PROSTATIC HYPERPLASIA WITH URINARY FREQUENCY: ICD-10-CM

## 2025-01-30 RX ORDER — TAMSULOSIN HYDROCHLORIDE 0.4 MG/1
1 CAPSULE ORAL DAILY
Qty: 30 CAPSULE | Refills: 0 | Status: SHIPPED | OUTPATIENT
Start: 2025-01-30

## 2025-01-30 NOTE — TELEPHONE ENCOUNTER
Rx Refill Note  Requested Prescriptions     Pending Prescriptions Disp Refills    tamsulosin (FLOMAX) 0.4 MG capsule 24 hr capsule [Pharmacy Med Name: TAMSULOSIN HCL 0.4 MG CAPSULE] 30 capsule 0     Sig: TAKE 1 CAPSULE BY MOUTH DAILY      Last office visit with prescribing clinician: 11/5/2024   Last telemedicine visit with prescribing clinician: Visit date not found   Next office visit with prescribing clinician: Visit date not found                         Would you like a call back once the refill request has been completed: [] Yes [] No    If the office needs to give you a call back, can they leave a voicemail: [] Yes [] No    Cheryl Macias MA  01/30/25, 09:07 EST

## 2025-02-27 DIAGNOSIS — N40.1 BENIGN PROSTATIC HYPERPLASIA WITH URINARY FREQUENCY: ICD-10-CM

## 2025-02-27 DIAGNOSIS — R35.0 BENIGN PROSTATIC HYPERPLASIA WITH URINARY FREQUENCY: ICD-10-CM

## 2025-02-27 RX ORDER — TAMSULOSIN HYDROCHLORIDE 0.4 MG/1
1 CAPSULE ORAL DAILY
Qty: 30 CAPSULE | Refills: 0 | Status: SHIPPED | OUTPATIENT
Start: 2025-02-27

## 2025-02-27 NOTE — TELEPHONE ENCOUNTER
Rx Refill Note  Requested Prescriptions     Pending Prescriptions Disp Refills    tamsulosin (FLOMAX) 0.4 MG capsule 24 hr capsule [Pharmacy Med Name: TAMSULOSIN HCL 0.4 MG CAPSULE] 30 capsule 0     Sig: TAKE 1 CAPSULE BY MOUTH DAILY      Last office visit with prescribing clinician: 11/5/2024   Last telemedicine visit with prescribing clinician: Visit date not found   Next office visit with prescribing clinician: Visit date not found                         Would you like a call back once the refill request has been completed: [] Yes [] No    If the office needs to give you a call back, can they leave a voicemail: [] Yes [] No    Cheryl Macias MA  02/27/25, 14:59 EST

## 2025-03-04 DIAGNOSIS — F06.4 ANXIETY DISORDER DUE TO GENERAL MEDICAL CONDITION WITH PANIC ATTACK: ICD-10-CM

## 2025-03-04 DIAGNOSIS — F41.0 ANXIETY DISORDER DUE TO GENERAL MEDICAL CONDITION WITH PANIC ATTACK: ICD-10-CM

## 2025-03-04 RX ORDER — HYDROXYZINE HYDROCHLORIDE 50 MG/1
TABLET, FILM COATED ORAL
Qty: 120 TABLET | Refills: 3 | Status: SHIPPED | OUTPATIENT
Start: 2025-03-04

## 2025-04-01 DIAGNOSIS — F41.0 PANIC ATTACKS: ICD-10-CM

## 2025-04-01 DIAGNOSIS — F41.1 GENERALIZED ANXIETY DISORDER: ICD-10-CM

## 2025-04-01 DIAGNOSIS — F41.0 ANXIETY DISORDER DUE TO GENERAL MEDICAL CONDITION WITH PANIC ATTACK: ICD-10-CM

## 2025-04-01 DIAGNOSIS — N40.1 BENIGN PROSTATIC HYPERPLASIA WITH URINARY FREQUENCY: ICD-10-CM

## 2025-04-01 DIAGNOSIS — F06.4 ANXIETY DISORDER DUE TO GENERAL MEDICAL CONDITION WITH PANIC ATTACK: ICD-10-CM

## 2025-04-01 DIAGNOSIS — R35.0 BENIGN PROSTATIC HYPERPLASIA WITH URINARY FREQUENCY: ICD-10-CM

## 2025-04-01 RX ORDER — HYDROXYZINE HYDROCHLORIDE 50 MG/1
25 TABLET, FILM COATED ORAL 4 TIMES DAILY PRN
Qty: 120 TABLET | Refills: 1 | Status: SHIPPED | OUTPATIENT
Start: 2025-04-01

## 2025-04-01 RX ORDER — CLONAZEPAM 1 MG/1
1 TABLET ORAL 2 TIMES DAILY PRN
Qty: 60 TABLET | Refills: 1 | Status: SHIPPED | OUTPATIENT
Start: 2025-04-01

## 2025-04-01 RX ORDER — TAMSULOSIN HYDROCHLORIDE 0.4 MG/1
1 CAPSULE ORAL DAILY
Qty: 30 CAPSULE | Refills: 0 | Status: SHIPPED | OUTPATIENT
Start: 2025-04-01

## 2025-04-01 NOTE — TELEPHONE ENCOUNTER
Incoming Refill Request      Medication requested (name and dose): CLONAZEPAM    Pharmacy where request should be sent: [facility]    Additional details provided by patient: APPT 5/6    Best call back number: [phone]    Does the patient have less than a 3 day supply:  [x] Yes  [] No    Glen Sanders Rep  04/01/25, 12:03 EDT

## 2025-04-01 NOTE — TELEPHONE ENCOUNTER
"Caller: Félix Brian Jaziel \"Ramesh\"    Relationship: Self    Best call back number: [phone]     Requested Prescriptions:   Requested Prescriptions     Pending Prescriptions Disp Refills    tamsulosin (FLOMAX) 0.4 MG capsule 24 hr capsule 30 capsule 0     Sig: Take 1 capsule by mouth Daily.        Pharmacy where request should be sent: [facility] [id number] [address] [facility] [phone] [facility] [phone]      Last office visit with prescribing clinician: 11/5/2024   Last telemedicine visit with prescribing clinician: Visit date not found   Next office visit with prescribing clinician: 4/3/2025     Does the patient have less than a 3 day supply:  [x] Yes  [] No    Would you like a call back once the refill request has been completed: [] Yes [x] No    If the office needs to give you a call back, can they leave a voicemail: [] Yes [x] No    Glen Shi Rep   04/01/25 12:51 EDT   "

## 2025-04-01 NOTE — TELEPHONE ENCOUNTER
Rx Refill Note  Requested Prescriptions     Pending Prescriptions Disp Refills    tamsulosin (FLOMAX) 0.4 MG capsule 24 hr capsule 30 capsule 0     Sig: Take 1 capsule by mouth Daily.      Last office visit with prescribing clinician: 11/5/2024   Last telemedicine visit with prescribing clinician: Visit date not found   Next office visit with prescribing clinician: 4/3/2025                         Would you like a call back once the refill request has been completed: [] Yes [] No    If the office needs to give you a call back, can they leave a voicemail: [] Yes [] No    April JUSTIN Soto  04/01/25, 13:59 EDT

## 2025-04-03 ENCOUNTER — OFFICE VISIT (OUTPATIENT)
Dept: FAMILY MEDICINE CLINIC | Facility: CLINIC | Age: 65
End: 2025-04-03
Payer: MEDICAID

## 2025-04-03 VITALS
OXYGEN SATURATION: 99 % | HEIGHT: 73 IN | SYSTOLIC BLOOD PRESSURE: 144 MMHG | HEART RATE: 85 BPM | BODY MASS INDEX: 26.61 KG/M2 | WEIGHT: 200.8 LBS | DIASTOLIC BLOOD PRESSURE: 86 MMHG

## 2025-04-03 DIAGNOSIS — R82.90 FOUL SMELLING URINE: Primary | ICD-10-CM

## 2025-04-03 DIAGNOSIS — N13.8 BPH WITH OBSTRUCTION/LOWER URINARY TRACT SYMPTOMS: ICD-10-CM

## 2025-04-03 DIAGNOSIS — N40.1 BPH WITH OBSTRUCTION/LOWER URINARY TRACT SYMPTOMS: ICD-10-CM

## 2025-04-03 DIAGNOSIS — R35.0 BENIGN PROSTATIC HYPERPLASIA WITH URINARY FREQUENCY: ICD-10-CM

## 2025-04-03 DIAGNOSIS — N40.1 BENIGN PROSTATIC HYPERPLASIA WITH URINARY FREQUENCY: ICD-10-CM

## 2025-04-03 PROCEDURE — 1125F AMNT PAIN NOTED PAIN PRSNT: CPT | Performed by: INTERNAL MEDICINE

## 2025-04-03 PROCEDURE — 3079F DIAST BP 80-89 MM HG: CPT | Performed by: INTERNAL MEDICINE

## 2025-04-03 PROCEDURE — 1159F MED LIST DOCD IN RCRD: CPT | Performed by: INTERNAL MEDICINE

## 2025-04-03 PROCEDURE — 99214 OFFICE O/P EST MOD 30 MIN: CPT | Performed by: INTERNAL MEDICINE

## 2025-04-03 PROCEDURE — 3077F SYST BP >= 140 MM HG: CPT | Performed by: INTERNAL MEDICINE

## 2025-04-03 PROCEDURE — 1160F RVW MEDS BY RX/DR IN RCRD: CPT | Performed by: INTERNAL MEDICINE

## 2025-04-03 RX ORDER — CIPROFLOXACIN 500 MG/1
500 TABLET, FILM COATED ORAL 2 TIMES DAILY
Qty: 14 TABLET | Refills: 0 | Status: SHIPPED | OUTPATIENT
Start: 2025-04-03 | End: 2025-04-10

## 2025-04-03 RX ORDER — FINASTERIDE 5 MG/1
5 TABLET, FILM COATED ORAL DAILY
Qty: 30 TABLET | Refills: 2 | Status: SHIPPED | OUTPATIENT
Start: 2025-04-03
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
## 2025-04-19 RX ORDER — SODIUM, POTASSIUM,MAG SULFATES 17.5-3.13G
SOLUTION, RECONSTITUTED, ORAL ORAL
Qty: 354 ML | Refills: 0 | Status: SHIPPED | OUTPATIENT
Start: 2025-04-19

## 2025-04-25 DIAGNOSIS — F41.0 PANIC ATTACKS: ICD-10-CM

## 2025-04-25 DIAGNOSIS — R35.0 BENIGN PROSTATIC HYPERPLASIA WITH URINARY FREQUENCY: ICD-10-CM

## 2025-04-25 DIAGNOSIS — F41.1 GENERALIZED ANXIETY DISORDER: ICD-10-CM

## 2025-04-25 DIAGNOSIS — N40.1 BENIGN PROSTATIC HYPERPLASIA WITH URINARY FREQUENCY: ICD-10-CM

## 2025-04-25 RX ORDER — TAMSULOSIN HYDROCHLORIDE 0.4 MG/1
1 CAPSULE ORAL DAILY
Qty: 30 CAPSULE | Refills: 0 | Status: SHIPPED | OUTPATIENT
Start: 2025-04-25

## 2025-04-25 RX ORDER — DESVENLAFAXINE 50 MG/1
50 TABLET, FILM COATED, EXTENDED RELEASE ORAL NIGHTLY
Qty: 90 TABLET | Refills: 1 | Status: SHIPPED | OUTPATIENT
Start: 2025-04-25

## 2025-04-25 NOTE — TELEPHONE ENCOUNTER
Rx Refill Note  Requested Prescriptions     Pending Prescriptions Disp Refills    tamsulosin (FLOMAX) 0.4 MG capsule 24 hr capsule [Pharmacy Med Name: TAMSULOSIN HCL 0.4 MG CAPSULE] 30 capsule 0     Sig: TAKE 1 CAPSULE BY MOUTH DAILY      Last office visit with prescribing clinician: 4/3/2025   Last telemedicine visit with prescribing clinician: Visit date not found   Next office visit with prescribing clinician: 5/5/2025                         Would you like a call back once the refill request has been completed: [] Yes [] No    If the office needs to give you a call back, can they leave a voicemail: [] Yes [] No    Cheryl Macias MA  04/25/25, 12:44 EDT

## 2025-04-29 ENCOUNTER — OUTSIDE FACILITY SERVICE (OUTPATIENT)
Dept: GASTROENTEROLOGY | Facility: CLINIC | Age: 65
End: 2025-04-29
Payer: MEDICAID

## 2025-04-29 PROCEDURE — 88305 TISSUE EXAM BY PATHOLOGIST: CPT | Performed by: INTERNAL MEDICINE

## 2025-04-30 ENCOUNTER — LAB REQUISITION (OUTPATIENT)
Dept: LAB | Facility: HOSPITAL | Age: 65
End: 2025-04-30
Payer: MEDICAID

## 2025-04-30 DIAGNOSIS — K57.30 DIVERTICULOSIS OF LARGE INTESTINE WITHOUT PERFORATION OR ABSCESS WITHOUT BLEEDING: ICD-10-CM

## 2025-04-30 DIAGNOSIS — K64.8 OTHER HEMORRHOIDS: ICD-10-CM

## 2025-04-30 DIAGNOSIS — Z86.0100 PERSONAL HISTORY OF COLON POLYPS, UNSPECIFIED: ICD-10-CM

## 2025-04-30 DIAGNOSIS — D12.3 BENIGN NEOPLASM OF TRANSVERSE COLON: ICD-10-CM

## 2025-05-01 DIAGNOSIS — F41.0 PANIC ATTACKS: ICD-10-CM

## 2025-05-01 DIAGNOSIS — F41.1 GENERALIZED ANXIETY DISORDER: ICD-10-CM

## 2025-05-01 LAB
CYTO UR: NORMAL
LAB AP CASE REPORT: NORMAL
LAB AP CLINICAL INFORMATION: NORMAL
PATH REPORT.FINAL DX SPEC: NORMAL
PATH REPORT.GROSS SPEC: NORMAL

## 2025-05-01 RX ORDER — DESVENLAFAXINE 25 MG/1
25 TABLET, EXTENDED RELEASE ORAL DAILY
Qty: 90 TABLET | OUTPATIENT
Start: 2025-05-01

## 2025-05-04 ENCOUNTER — RESULTS FOLLOW-UP (OUTPATIENT)
Dept: LAB | Facility: HOSPITAL | Age: 65
End: 2025-05-04
Payer: MEDICAID

## 2025-05-04 NOTE — LETTER
May 4, 2025    Félix Brian  [address] [facility] [address]        Dear Mr. Brian:    This letter is in regard to your recent colonoscopy of April 29, 2025.  This colonoscopy was in follow-up of the October 2024 colonoscopy which was remarkable for a large ascending colon polyp.     The polyp(s) removed proved to be adenomatous.  Specifically, the polyp removed from the transverse colon and ascending colon proved to be a tubular adenomas.  Adenomatous polyps are benign polyps that have the potential for becoming malignant.  The majority of adenomatous polyps do not become malignant.     A repeat colonoscopy is recommended in 3 years to diminish your future risk for developing colon cancer.     I hope this letter finds you well. Please call our office with any questions or concerns you may have.  Please also refer to the endoscopy report provided to you on the day of your procedure for further details.     Sincerely,  Selvin Vickers M.D.     CC: Nik Bolaños DO

## 2025-05-04 NOTE — TELEPHONE ENCOUNTER
May 4, 2025    Félix Brian  [address] [facility] [address]        Dear Mr. Brian:    This letter is in regard to your recent colonoscopy of April 29, 2025.  This colonoscopy was in follow-up of the October 2024 colonoscopy which was remarkable for a large ascending colon polyp.     The polyp(s) removed proved to be adenomatous.  Specifically, the polyp removed from the transverse colon and ascending colon proved to be a tubular adenomas.  Adenomatous polyps are benign polyps that have the potential for becoming malignant.  The majority of adenomatous polyps do not become malignant.     A repeat colonoscopy is recommended in 3 years to diminish your future risk for developing colon cancer.     I hope this letter finds you well. Please call our office with any questions or concerns you may have.  Please also refer to the endoscopy report provided to you on the day of your procedure for further details.     Sincerely,  Selvin Vickers M.D.     CC: Nik Bolaños DO

## 2025-05-05 ENCOUNTER — OFFICE VISIT (OUTPATIENT)
Dept: FAMILY MEDICINE CLINIC | Facility: CLINIC | Age: 65
End: 2025-05-05
Payer: MEDICAID

## 2025-05-05 VITALS
WEIGHT: 210.4 LBS | HEART RATE: 72 BPM | OXYGEN SATURATION: 99 % | SYSTOLIC BLOOD PRESSURE: 130 MMHG | HEIGHT: 73 IN | BODY MASS INDEX: 27.89 KG/M2 | DIASTOLIC BLOOD PRESSURE: 84 MMHG

## 2025-05-05 DIAGNOSIS — H61.22 IMPACTED CERUMEN OF LEFT EAR: ICD-10-CM

## 2025-05-05 DIAGNOSIS — N40.1 BPH WITH OBSTRUCTION/LOWER URINARY TRACT SYMPTOMS: Primary | ICD-10-CM

## 2025-05-05 DIAGNOSIS — N13.8 BPH WITH OBSTRUCTION/LOWER URINARY TRACT SYMPTOMS: Primary | ICD-10-CM

## 2025-05-05 LAB
BILIRUB BLD-MCNC: NEGATIVE MG/DL
CLARITY, POC: CLEAR
COLOR UR: YELLOW
GLUCOSE UR STRIP-MCNC: NEGATIVE MG/DL
KETONES UR QL: NEGATIVE
LEUKOCYTE EST, POC: NEGATIVE
NITRITE UR-MCNC: NEGATIVE MG/ML
PH UR: 7 [PH] (ref 5–8)
PROT UR STRIP-MCNC: NEGATIVE MG/DL
RBC # UR STRIP: NEGATIVE /UL
SP GR UR: 1.01 (ref 1–1.03)
UROBILINOGEN UR QL: NORMAL

## 2025-05-05 RX ORDER — FINASTERIDE 5 MG/1
5 TABLET, FILM COATED ORAL DAILY
Qty: 30 TABLET | Refills: 2 | Status: SHIPPED | OUTPATIENT
Start: 2025-05-05

## 2025-05-06 ENCOUNTER — OFFICE VISIT (OUTPATIENT)
Dept: BEHAVIORAL HEALTH | Facility: CLINIC | Age: 65
End: 2025-05-06
Payer: MEDICAID

## 2025-05-06 VITALS — BODY MASS INDEX: 27.43 KG/M2 | WEIGHT: 207 LBS | HEIGHT: 73 IN

## 2025-05-06 DIAGNOSIS — Z51.81 THERAPEUTIC DRUG MONITORING: ICD-10-CM

## 2025-05-06 DIAGNOSIS — F41.1 GENERALIZED ANXIETY DISORDER WITH PANIC ATTACKS: Primary | ICD-10-CM

## 2025-05-06 DIAGNOSIS — F41.0 GENERALIZED ANXIETY DISORDER WITH PANIC ATTACKS: Primary | ICD-10-CM

## 2025-05-06 RX ORDER — HYDROXYZINE HYDROCHLORIDE 50 MG/1
50 TABLET, FILM COATED ORAL 4 TIMES DAILY PRN
Qty: 120 TABLET | Refills: 2 | Status: SHIPPED | OUTPATIENT
Start: 2025-05-06

## 2025-05-06 NOTE — PROGRESS NOTES
Follow Up Office Visit      Date: 2025   Patient Name: Félix Brian  : 1960   MRN: [id number]     Patient or patient representative verbalized consent for the use of Ambient Listening during the visit with  MARIANNA Galan for chart documentation. 2025  13:39 EDT    Chief Complaint:  Félix Brian is a 65 y.o. male who is here today for follow up with medication management for anxiety.    History of Present Illness  He reports experiencing severe panic attacks, occurring in clusters of 3 to 4 episodes, which were not prevented by his use of Pristiq. Consequently, he has discontinued the use of this medication. He also mentions that he did not initially realize the need to halve the dose of hydroxyzine, resulting in an early depletion of his supply. A refill was provided last week, but only for a 15-day period. He has been using hydroxyzine since . He has been on Klonopin since  and has submitted a urine sample today.  Though he still struggles to sleep on a regular basis, he is reluctant to try new medications.    He has been diagnosed with prostate issues and has been advised to undergo GreenLight laser surgery, which he has declined. He is currently on two medications for his prostate condition.    He reports memory issues since the passing of his sister and expresses concern about the rapid regrowth of polyps in his colon, which have increased from 6 mm to 8 mm within a 3-month period. He has undergone colonoscopy last week and had polyps removed.    He experienced sudden hearing loss in his left ear upon waking one morning, accompanied by the expulsion of wax chunks. He has an appointment with an ENT specialist tomorrow.    Interim History: He reports that the last month has been particularly rough and expresses a need for quiet and a slow down. He mentions that he has slept well for the last few nights and woke up pain-free two days ago, which he  describes as unusual. He also notes that his cat has been providing comfort and positive sleep behaviors.    Social History:  [place]  - Sister passed away  - Rescued and cares for multiple animals (cats, horse)  - Financially strained, relies on social security and disability    Subjective     Review of Systems:   Review of Systems   Psychiatric/Behavioral:  Positive for depressed mood and stress. The patient is nervous/anxious.        Screening Scores:   PHQ-9: 18 (last visit, 20)  LEONOR-7: 12 (last visit, 19)    Medications:     Current Outpatient Medications:     clonazePAM (KlonoPIN) 1 MG tablet, Take 1 tablet by mouth 2 (Two) Times a Day As Needed for Anxiety., Disp: 60 tablet, Rfl: 1    finasteride (Proscar) 5 MG tablet, Take 1 tablet by mouth Daily., Disp: 30 tablet, Rfl: 2    hydrOXYzine (ATARAX) 50 MG tablet, Take 1 tablet by mouth 4 (Four) Times a Day As Needed for Anxiety., Disp: 120 tablet, Rfl: 2    omeprazole (priLOSEC) 40 MG capsule, Take 1 capsule by mouth Daily., Disp: 90 capsule, Rfl: 3    sodium-potassium-magnesium sulfates (Suprep Bowel Prep Kit) 17.5-3.13-1.6 GM/177ML solution oral solution, Take First Dose at 5 pm Take Second Dose at 10 pm, Nothing to eat or drink after midnight. May substitute for Golytely or Moviprep. Follow instructions provided by Office. Call [phone] with questions., Disp: 354 mL, Rfl: 0    tamsulosin (FLOMAX) 0.4 MG capsule 24 hr capsule, TAKE 1 CAPSULE BY MOUTH DAILY, Disp: 30 capsule, Rfl: 0    Allergies:   Allergies   Allergen Reactions    Ativan [Lorazepam] Mental Status Change     Headaches, confusion    Buspar [Buspirone] Mental Status Change       Results Reviewed: n/a     The following portion of the patient's history were reviewed and updated appropriately: allergies, current and past medications, family history, medical history and social history.    Objective     Vital Signs:   Vitals:    05/06/25 1323   Weight: 93.9 kg (207 lb)  "  Height: 185.4 cm (72.99\")     Body mass index is 27.32 kg/m².     Mental Status Exam:   MENTAL STATUS EXAM   General Appearance:  Cleanly groomed and dressed  Eye Contact:  Good eye contact  Attitude:  Cooperative  Motor Activity:  Normal gait, posture, fidgety and foot tapping  Muscle Strength:  Normal  Speech:  Normal rate, tone, volume  Language:  Spontaneous  Mood and affect:  Normal, pleasant, anxious and depressed  Hopelessness:  4  Loneliness: 4  Thought Process:  Logical  Associations/ Thought Content:  No delusions  Hallucinations:  None  Suicidal Ideations:  Not present  Homicidal Ideation:  Not present  Sensorium:  Alert and clear  Orientation:  Person, place, time and situation  Immediate Recall, Recent, and Remote Memory:  Intact  Attention Span/ Concentration:  Good  Fund of Knowledge:  Appropriate for age and educational level  Intellectual Functioning:  Average range  Insight:  Good  Judgement:  Good  Reliability:  Good  Impulse Control:  Good        SUICIDE RISK ASSESSMENT/CSSRS:  1. Does patient have thoughts of suicide? no  2. Does patient have intent for suicide? no  3. Does patient have a current plan for suicide? no  4. History of suicide attempts: no  5. Family history of suicide or attempts: no  6. History of violent behaviors towards others or property or thoughts of committing suicide: no  7. History of sexual aggression toward others: no  8. Access to firearms or weapons: no    Labs Reviewed: n/a  UDS Reviewed: ordered today  Chart since last visit reviewed: yes    Assessment / Plan    Quality Measures:  Tobacco cessation: Patient denies tobacco use. No tobacco cessation education necessary.    Depression (PHQ >9): Patient screened positive for depression with a PHQ score of 18. Follow up recommendations include medication management, suicide risk assessment, continued screening score monitoring and supportive care.    Medication Considerations:  Benzo: CSA up to date and on " file  Stimulants: n/a   ERICK reviewed and appropriate.     Risk Assessment: Risk of self-harm acutely is low. Risk of self-harm chronically is also low, but could be further elevated in the event of treatment noncompliance and/or AODA.    Impression/Formulation:  Patient appeared alert and oriented.  Patient is voluntarily seeking psychiatric care at [facility].  Patient is receptive to assistance with maintaining a stable lifestyle.  Conditions being treated include     ICD-10-CM ICD-9-CM   1. Generalized anxiety disorder with panic attacks  F41.1 300.02    F41.0 300.01   2. Therapeutic drug monitoring  Z51.81 V58.83   .     Visit Diagnosis/Orders Placed This Visit:  Diagnoses and all orders for this visit:    1. Generalized anxiety disorder with panic attacks (Primary)  -     hydrOXYzine (ATARAX) 50 MG tablet; Take 1 tablet by mouth 4 (Four) Times a Day As Needed for Anxiety.  Dispense: 120 tablet; Refill: 2    2. Therapeutic drug monitoring  -     Compliance Drug Analysis, Ur - Urine, Clean Catch         Assessment & Plan  Content of Therapy:  - Discussed the severe panic attacks experienced, despite being on Pristiq, leading to its discontinuation.  - Explored the effectiveness of current medications (Klonopin and hydroxyzine) for managing anxiety.  - Discussed the potential benefits and side effects of mirtazapine for sleep, depression, and anxiety.  - Explored memory issues following the passing of his sister.    Clinical Impression:  The patient continues to experience significant anxiety, which was exacerbated by the introduction of Pristiq, leading to severe panic attacks. Current management with Klonopin and hydroxyzine appears to be effective, and the patient prefers to maintain this regimen. Memory issues have been noted, particularly since the loss of his sister, but the patient is not ready to start new treatments for this concern. The patient is also dealing with prostate  issues and has declined recommended surgery. Additionally, he is experiencing ear wax buildup causing hearing loss in his left ear, for which he has an upcoming appointment.    Therapeutic Intervention:  - Cognitive-behavioral strategies to manage anxiety and panic attacks.  - Psychoeducation on the potential benefits and side effects of mirtazapine and propranolol.  - Supportive counseling to address grief and memory issues.  - Practical advice for managing ear wax buildup.    Plan:  - Continue current medications: Klonopin and hydroxyzine.  - Prescription for hydroxyzine 50 mg, to be taken four times daily, with 120 tablets and two refills.  - Consider propranolol for intermittent anxiety if needed.  - Follow up with the ear doctor for wax removal.  - Maintain current prostate medications and monitor symptoms.  - Encourage self-care and relaxation techniques to manage anxiety and stress.    Follow-up:  - Follow-up appointment scheduled in 3 months.  - Goals for the next session include evaluating the effectiveness of current medications, discussing any new symptoms or concerns, and exploring additional coping strategies for anxiety and memory issues.    Notes & Risk Factors:  - Risk factors include severe anxiety and potential for panic attacks.  - Protective factors include supportive relationships with pets and a structured routine.    Any medications prescribed have been sent electronically to [facility] on [facility] in [place].        Patient will continue supportive psychotherapy efforts and medications as indicated.  Discussed medication options and treatment plan of prescribed medication(s) as well as the risks, benefits, and potential side effects. Patient will contact this office, call 911 or present to the nearest emergency room should suicidal or homicidal ideations occur. Clinic will obtain release of information for current treatment team for continuity of care as needed. Patient ackowledged and  verbally consented to continue with current treatment plan and was educated on the importance of compliance with treatment and follow-up appointments.           MARIANNA Butler  [facility]    This is electronically signed by MARIANNA Butler  05/06/2025 13:39 EDT
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
## 2025-05-13 LAB — DRUGS UR: NORMAL

## 2025-05-30 DIAGNOSIS — N40.1 BENIGN PROSTATIC HYPERPLASIA WITH URINARY FREQUENCY: ICD-10-CM

## 2025-05-30 DIAGNOSIS — F41.1 GENERALIZED ANXIETY DISORDER: ICD-10-CM

## 2025-05-30 DIAGNOSIS — F41.0 PANIC ATTACKS: ICD-10-CM

## 2025-05-30 DIAGNOSIS — R82.90 FOUL SMELLING URINE: ICD-10-CM

## 2025-05-30 DIAGNOSIS — R35.0 BENIGN PROSTATIC HYPERPLASIA WITH URINARY FREQUENCY: ICD-10-CM

## 2025-05-30 RX ORDER — CIPROFLOXACIN 500 MG/1
500 TABLET, FILM COATED ORAL 2 TIMES DAILY
Qty: 14 TABLET | Refills: 0 | Status: SHIPPED | OUTPATIENT
Start: 2025-05-30 | End: 2025-06-06

## 2025-05-30 RX ORDER — TAMSULOSIN HYDROCHLORIDE 0.4 MG/1
1 CAPSULE ORAL DAILY
Qty: 30 CAPSULE | Refills: 0 | Status: SHIPPED | OUTPATIENT
Start: 2025-05-30

## 2025-06-02 RX ORDER — CLONAZEPAM 1 MG/1
1 TABLET ORAL 2 TIMES DAILY PRN
Qty: 60 TABLET | Refills: 1 | Status: SHIPPED | OUTPATIENT
Start: 2025-06-02

## 2025-07-01 DIAGNOSIS — R35.0 BENIGN PROSTATIC HYPERPLASIA WITH URINARY FREQUENCY: ICD-10-CM

## 2025-07-01 DIAGNOSIS — N40.1 BENIGN PROSTATIC HYPERPLASIA WITH URINARY FREQUENCY: ICD-10-CM

## 2025-07-01 DIAGNOSIS — R82.90 FOUL SMELLING URINE: ICD-10-CM

## 2025-07-02 RX ORDER — TAMSULOSIN HYDROCHLORIDE 0.4 MG/1
1 CAPSULE ORAL DAILY
Qty: 30 CAPSULE | Refills: 0 | Status: SHIPPED | OUTPATIENT
Start: 2025-07-02 | End: 2025-07-03 | Stop reason: SDUPTHER

## 2025-07-02 RX ORDER — CIPROFLOXACIN 500 MG/1
500 TABLET, FILM COATED ORAL 2 TIMES DAILY
Qty: 14 TABLET | Refills: 0 | OUTPATIENT
Start: 2025-07-02 | End: 2025-07-09

## 2025-07-03 DIAGNOSIS — F41.0 PANIC ATTACKS: ICD-10-CM

## 2025-07-03 DIAGNOSIS — N13.8 BPH WITH OBSTRUCTION/LOWER URINARY TRACT SYMPTOMS: ICD-10-CM

## 2025-07-03 DIAGNOSIS — F41.1 GENERALIZED ANXIETY DISORDER: ICD-10-CM

## 2025-07-03 DIAGNOSIS — N40.1 BENIGN PROSTATIC HYPERPLASIA WITH URINARY FREQUENCY: ICD-10-CM

## 2025-07-03 DIAGNOSIS — R35.0 BENIGN PROSTATIC HYPERPLASIA WITH URINARY FREQUENCY: ICD-10-CM

## 2025-07-03 DIAGNOSIS — N40.1 BPH WITH OBSTRUCTION/LOWER URINARY TRACT SYMPTOMS: ICD-10-CM

## 2025-07-03 RX ORDER — SODIUM, POTASSIUM,MAG SULFATES 17.5-3.13G
SOLUTION, RECONSTITUTED, ORAL ORAL
Qty: 354 ML | Refills: 0 | Status: SHIPPED | OUTPATIENT
Start: 2025-07-03

## 2025-07-07 RX ORDER — CLONAZEPAM 1 MG/1
1 TABLET ORAL 2 TIMES DAILY PRN
Qty: 60 TABLET | Refills: 1 | Status: SHIPPED | OUTPATIENT
Start: 2025-07-07

## 2025-07-07 RX ORDER — TAMSULOSIN HYDROCHLORIDE 0.4 MG/1
1 CAPSULE ORAL DAILY
Qty: 30 CAPSULE | Refills: 0 | Status: SHIPPED | OUTPATIENT
Start: 2025-07-07

## 2025-07-07 RX ORDER — FINASTERIDE 5 MG/1
5 TABLET, FILM COATED ORAL DAILY
Qty: 30 TABLET | Refills: 2 | Status: SHIPPED | OUTPATIENT
Start: 2025-07-07

## 2025-08-05 ENCOUNTER — OFFICE VISIT (OUTPATIENT)
Dept: FAMILY MEDICINE CLINIC | Facility: CLINIC | Age: 65
End: 2025-08-05
Payer: MEDICAID

## 2025-08-05 VITALS
OXYGEN SATURATION: 99 % | SYSTOLIC BLOOD PRESSURE: 124 MMHG | DIASTOLIC BLOOD PRESSURE: 80 MMHG | HEART RATE: 57 BPM | WEIGHT: 211.2 LBS | BODY MASS INDEX: 27.99 KG/M2 | HEIGHT: 73 IN

## 2025-08-05 DIAGNOSIS — N13.8 BPH WITH OBSTRUCTION/LOWER URINARY TRACT SYMPTOMS: Primary | ICD-10-CM

## 2025-08-05 DIAGNOSIS — F41.9 ANXIETY AND DEPRESSION: ICD-10-CM

## 2025-08-05 DIAGNOSIS — N40.1 BPH WITH OBSTRUCTION/LOWER URINARY TRACT SYMPTOMS: Primary | ICD-10-CM

## 2025-08-05 DIAGNOSIS — F32.A ANXIETY AND DEPRESSION: ICD-10-CM

## 2025-08-07 ENCOUNTER — OFFICE VISIT (OUTPATIENT)
Dept: BEHAVIORAL HEALTH | Facility: CLINIC | Age: 65
End: 2025-08-07
Payer: MEDICAID

## 2025-08-07 VITALS
WEIGHT: 210 LBS | SYSTOLIC BLOOD PRESSURE: 128 MMHG | HEIGHT: 73 IN | DIASTOLIC BLOOD PRESSURE: 82 MMHG | BODY MASS INDEX: 27.83 KG/M2

## 2025-08-07 DIAGNOSIS — F41.0 GENERALIZED ANXIETY DISORDER WITH PANIC ATTACKS: Primary | ICD-10-CM

## 2025-08-07 DIAGNOSIS — F41.1 GENERALIZED ANXIETY DISORDER WITH PANIC ATTACKS: Primary | ICD-10-CM

## 2025-08-07 RX ORDER — CLONAZEPAM 1 MG/1
1 TABLET ORAL 2 TIMES DAILY PRN
Qty: 60 TABLET | Refills: 2 | Status: SHIPPED | OUTPATIENT
Start: 2025-08-07

## 2025-08-07 RX ORDER — HYDROXYZINE HYDROCHLORIDE 50 MG/1
50 TABLET, FILM COATED ORAL 4 TIMES DAILY PRN
Qty: 120 TABLET | Refills: 2 | Status: SHIPPED | OUTPATIENT
Start: 2025-08-07

## (undated) DEVICE — GW PERIPH VASC ADX J/TP SS .035 150CM 3MM

## (undated) DEVICE — CATH DIAG EXPO M/ PK 6FR FL4/FR4 PIG 3PK

## (undated) DEVICE — ANGIO-SEAL VIP VASCULAR CLOSURE DEVICE: Brand: ANGIO-SEAL

## (undated) DEVICE — PINNACLE INTRODUCER SHEATH: Brand: PINNACLE

## (undated) DEVICE — PK CATH CARD 10

## (undated) DEVICE — KT MANIFOLD CATHLAB CUST